# Patient Record
Sex: FEMALE | Race: WHITE | NOT HISPANIC OR LATINO | Employment: FULL TIME | URBAN - NONMETROPOLITAN AREA
[De-identification: names, ages, dates, MRNs, and addresses within clinical notes are randomized per-mention and may not be internally consistent; named-entity substitution may affect disease eponyms.]

---

## 2017-03-03 DIAGNOSIS — B00.2 ORAL HERPES: ICD-10-CM

## 2017-03-06 RX ORDER — VALACYCLOVIR HYDROCHLORIDE 1 G/1
TABLET, FILM COATED ORAL
Qty: 8 TABLET | OUTPATIENT
Start: 2017-03-06

## 2017-03-06 NOTE — TELEPHONE ENCOUNTER
Refill request received for Valtrex. Last seen 11/2015. No-showed 12/2016 and has not rescheduled.    Please sign or decline RX in this encounter.

## 2017-03-21 DIAGNOSIS — Z12.31 VISIT FOR SCREENING MAMMOGRAM: Primary | ICD-10-CM

## 2017-03-24 PROCEDURE — G0202 SCR MAMMO BI INCL CAD: HCPCS | Mod: TC | Performed by: RADIOLOGY

## 2017-03-24 PROCEDURE — 77063 BREAST TOMOSYNTHESIS BI: CPT | Mod: TC | Performed by: RADIOLOGY

## 2017-06-06 ENCOUNTER — MEDICAL CORRESPONDENCE (OUTPATIENT)
Dept: HEALTH INFORMATION MANAGEMENT | Facility: HOSPITAL | Age: 52
End: 2017-06-06

## 2017-06-15 ENCOUNTER — OFFICE VISIT (OUTPATIENT)
Dept: OTOLARYNGOLOGY | Facility: OTHER | Age: 52
End: 2017-06-15
Attending: OTOLARYNGOLOGY
Payer: COMMERCIAL

## 2017-06-15 VITALS
OXYGEN SATURATION: 99 % | WEIGHT: 160 LBS | TEMPERATURE: 97.2 F | HEART RATE: 80 BPM | DIASTOLIC BLOOD PRESSURE: 70 MMHG | BODY MASS INDEX: 25.71 KG/M2 | HEIGHT: 66 IN | SYSTOLIC BLOOD PRESSURE: 114 MMHG

## 2017-06-15 DIAGNOSIS — D10.30 FIBROMA OF INTRAORAL REGION: ICD-10-CM

## 2017-06-15 DIAGNOSIS — K13.70 ORAL LESION: Primary | ICD-10-CM

## 2017-06-15 PROCEDURE — 88305 TISSUE EXAM BY PATHOLOGIST: CPT | Mod: TC | Performed by: OTOLARYNGOLOGY

## 2017-06-15 PROCEDURE — 99203 OFFICE O/P NEW LOW 30 MIN: CPT | Mod: 25 | Performed by: OTOLARYNGOLOGY

## 2017-06-15 PROCEDURE — 40812 EXCISE/REPAIR MOUTH LESION: CPT | Performed by: OTOLARYNGOLOGY

## 2017-06-15 RX ORDER — LORATADINE 10 MG/1
10 TABLET ORAL PRN
COMMUNITY

## 2017-06-15 ASSESSMENT — PAIN SCALES - GENERAL: PAINLEVEL: NO PAIN (0)

## 2017-06-15 NOTE — MR AVS SNAPSHOT
After Visit Summary   6/15/2017    Patience Mireles    MRN: [id number]           Patient Information     Date Of Birth          1965        Visit Information        Provider Department      6/15/2017 1:15 PM Char Dunlap MD [facility]        Today's Diagnoses     Oral lesion    -  1    left buccal bite fibroma          Care Instructions    Thank you for allowing Dr. Dunlap and our ENT team to participate in your care.  If you have a scheduling or an appointment question please contact [facility] Unit Coordinator at their direct line [phone].   ALL nursing questions or concerns can be directed to your ENT nurse at: [phone] - [place]        POST PROCEDURE INSTRUCTIONS      For oral lesions, rinse your mouth with a mixture of half water and half hydrogen peroxide 3 times daily, after meals and before bed.       For lip lesions, apply Aquaphor Healing Ointment to the wound 3 times daily after cleaning with above mixture(Unless specified Bacitracin).      You can apply ice to the surgical area to help reduce swelling. (no longer than 20 minutes at a time)       You can use acetaminophen(Tylenol) or the prescription you received for pain.       If you have sutures in place these are dissolvable. They will fall out on their own. DO NOT play with them as this will cause more irritation to the surgical area.       If cautery was used, that is the blackened area you see. This will fade away and can become a white patchy area. This is normal! Continue to clean wound as described above.     SIGNS OF INFECTION ARE:    Redness, swelling, red streaks, pus, drainage, warmth, fever, increased pain, foul smell.     Contact your primary health care provider if you notice any of the warning signs.     FOLLOW - UP    Follow-up as needed in clinic.     Pathology results will be called to you when they are back. This can take approx. 7-10 days.             Follow-ups after  "your visit        Who to contact     If you have questions or need follow up information about today's clinic visit or your schedule please contact [facility] directly at [phone].  Normal or non-critical lab and imaging results will be communicated to you by MyChart, letter or phone within 4 business days after the clinic has received the results. If you do not hear from us within 7 days, please contact the clinic through MyChart or phone. If you have a critical or abnormal lab result, we will notify you by phone as soon as possible.  Submit refill requests through [employer] or call your pharmacy and they will forward the refill request to us. Please allow 3 business days for your refill to be completed.          Additional Information About Your Visit        [employer] Information     [employer] gives you secure access to your electronic health record. If you see a primary care provider, you can also send messages to your care team and make appointments. If you have questions, please call your primary care clinic.  If you do not have a primary care provider, please call [phone] and they will assist you.        Care EveryWhere ID     This is your Care EveryWhere ID. This could be used by other organizations to access your [place] medical records  [id number]        Your Vitals Were     Pulse Temperature Height Pulse Oximetry BMI (Body Mass Index)       80 97.2  F (36.2  C) (Tympanic) 5' 5.5\" (1.664 m) 99% 26.22 kg/m2        Blood Pressure from Last 3 Encounters:   06/15/17 114/70   03/11/16 103/67   11/11/15 108/72    Weight from Last 3 Encounters:   06/15/17 160 lb (72.6 kg)   03/11/16 160 lb (72.6 kg)   12/19/13 175 lb (79.4 kg)              We Performed the Following     EXCISION LESION MOUTH W SIMPLE REPAIR     Surgical pathology exam        Primary Care Provider Office Phone # Fax #    CAROLINA Mederos [phone]       [facility] [address]  [place] " [place] [address]        Thank you!     Thank you for choosing [facility]  for your care. Our goal is always to provide you with excellent care. Hearing back from our patients is one way we can continue to improve our services. Please take a few minutes to complete the written survey that you may receive in the mail after your visit with us. Thank you!             Your Updated Medication List - Protect others around you: Learn how to safely use, store and throw away your medicines at www.disposemymeds.org.          This list is accurate as of: 6/15/17  1:43 PM.  Always use your most recent med list.                   Brand Name Dispense Instructions for use    * estradiol 0.5 MG tablet    ESTRACE    90 tablet    TAKE 1 OR 2 TABLETS BY MOUTH EVERY DAY AS NEEDED       * estradiol 0.1 MG/24HR BIW patch    VIVELLE-DOT    12 patch    Place 1 patch onto the skin once a week       loratadine 10 MG tablet    CLARITIN     Take 10 mg by mouth daily       valACYclovir 1000 mg tablet    VALTREX    8 tablet    Take 2 tablets (2,000 mg) by mouth 2 times daily       * Notice:  This list has 2 medication(s) that are the same as other medications prescribed for you. Read the directions carefully, and ask your doctor or other care provider to review them with you.

## 2017-06-15 NOTE — NURSING NOTE
"Chief Complaint   Patient presents with     Consult     skin tag in mouth        Initial /70 (BP Location: Right arm, Patient Position: Chair, Cuff Size: Adult Regular)  Pulse 80  Temp 97.2  F (36.2  C) (Tympanic)  Ht 5' 5.5\" (1.664 m)  Wt 160 lb (72.6 kg)  SpO2 99%  BMI 26.22 kg/m2 Estimated body mass index is 26.22 kg/(m^2) as calculated from the following:    Height as of this encounter: 5' 5.5\" (1.664 m).    Weight as of this encounter: 160 lb (72.6 kg).  Medication Reconciliation: complete     Marbella Hogue LPN      "

## 2017-06-15 NOTE — PROGRESS NOTES
Otolaryngology Consultation    Patient: Patience Mireles  : 1965    Patient presents with:  Consult: skin tag in mouth   Referral:  Dr. Herrera, [place]    HPI:  Patience Mireles is a 52 year old female seen today for an oral cavity lesion  She noted this over 6 months ago, denies noting initial trauma to area but she now repeatedly bites the area causing pain and enlargement  Never tobacco user  No spontaneous bleeding nor ulceration    Current Outpatient Rx   Medication Sig Dispense Refill     loratadine (CLARITIN) 10 MG tablet Take 10 mg by mouth daily       estradiol (VIVELLE-DOT) 0.1 MG/24HR patch Place 1 patch onto the skin once a week 12 patch 2     estradiol (ESTRACE) 0.5 MG tablet TAKE 1 OR 2 TABLETS BY MOUTH EVERY DAY AS NEEDED 90 tablet 3     valACYclovir (VALTREX) 1000 mg tablet Take 2 tablets (2,000 mg) by mouth 2 times daily 8 tablet prn       Allergies: Review of patient's allergies indicates no known allergies.     Past Medical History:   Diagnosis Date     Asymptomatic varicose veins 2003     Carcinoma in situ of cervix uteri 2000     Chest pain, unspecified 2004     Dysmenorrhea 2000     Endometriosis of uterus 2000     Endometriosis, site unspecified 10/26/2000     Follow-up examination, following unspecified surgery 2001     Leukorrhea, not specified as infective 2007     Need for prophylactic vaccination and inoculation against viral hepatitis 2009     Nonallopathic lesion of cervical region, not elsewhere classified 2001     Other screening mammogram 2000     PONV (postoperative nausea and vomiting)      Routine general medical examination at a health care facility 2001       Past Surgical History:   Procedure Laterality Date     COLONOSCOPY N/A 2014    Procedure: COLONOSCOPY;  Surgeon: Quincy Fontanez MD;  Location: [facility] OR     ESOPHAGOSCOPY, GASTROSCOPY, DUODENOSCOPY (EGD), COMBINED  2013    Procedure: COMBINED  "ESOPHAGOSCOPY, GASTROSCOPY, DUODENOSCOPY (EGD);   ESOPHAGOGASTRODUODENOSCOPY WITH BIOPSIES;  Surgeon: Quincy Fontanez MD;  Location: [facility] OR     [facility] [place] [address]       ENT family history reviewed    Social History   Substance Use Topics     Smoking status: Never Smoker     Smokeless tobacco: Not on file     Alcohol use 0.0 oz/week       Review of Systems  ROS: 10 point ROS neg other than the symptoms noted above in the HPI and itchy eyes, night sweats    Physical Exam  /70 (BP Location: Right arm, Patient Position: Chair, Cuff Size: Adult Regular)  Pulse 80  Temp 97.2  F (36.2  C) (Tympanic)  Ht 5' 5.5\" (1.664 m)  Wt 160 lb (72.6 kg)  SpO2 99%  BMI 26.22 kg/m2  General - The patient is well nourished and well developed, and appears to have good nutritional status.  Alert and oriented to person and place, answers questions and cooperates with examination appropriately.   Head and Face - Normocephalic and atraumatic, with no gross asymmetry noted.  The facial nerve is intact, with strong symmetric movements.  Voice and Breathing - The patient was breathing comfortably without the use of accessory muscles. There was no wheezing, stridor, or stertor.  The patients voice was clear and strong, and had appropriate pitch and quality.  Ears -The external auditory canals are patent, the tympanic membranes are intact without effusion, retraction or mass.  Bony landmarks are intact.  Eyes - Extraocular movements intact, and the pupils were reactive to light.  Sclera were not icteric or injected, conjunctiva were pink and moist.  Mouth - Examination of the oral cavity showed pink, healthy oral mucosa. No ulcerations noted.  The tongue was mobile and midline, and the dentition were in good condition.     There is a 1.0 cm raised bite fibroma left buccal mucosa  No ulceration  Throat - The walls of the oropharynx were smooth, pink, moist, symmetric, and had no lesions or ulcerations.  The tonsillar pillars and soft " palate were symmetric.  The uvula was midline on elevation.  Grade 3 tonsils  Neck - Normal midline excursion of the laryngotracheal complex during swallowing.  Full range of motion on passive movement.  Palpation of the occipital, submental, submandibular, internal jugular chain, and supraclavicular nodes did not demonstrate any abnormal lymph nodes or masses.  Palpation of the thyroid was soft and smooth, with no nodules or goiter appreciated.  The trachea was mobile and midline.  Nose - External contour is symmetric, no gross deflection or scars.  Nasal mucosa is pink and moist with no abnormal mucus.  The septum and turbinates were evaluated: non obstructive.  No polyps, masses, or purulence noted on examination.      Procedure - Oral cavity lesion Removal    Informed consent was discussed and signed, and risks of the procedure were discussed, including bleeding, anesthesia, infection, scar formation, numbness, need for additional surgery, injury to salivary tissue.  I then infiltrated the mucosal tissues with 1% lidocaine with 1:200,000 epinephrine.  I then used a 15-blade to create an thin elliptical incision around the lesion.  I then elevated the  ellipse and a thin cuff of underlying normal appearing mucosa with the scalpel at the left buccal mucosa.  I proceeded to use a fine iris scissor to undermine the lesion and excise it.   Hemostasis was achieved with direct pressure and silver nitrate cautery.  The total length of the incision was 1.0 cm.  The specimen(s) was placed in formalin and sent to pathology.  The mucosal edges were then reapproximated using 4-0 chromic, one deep mucosal suture and 2 simple interrupted sutures mucosal sutures.  The patient tolerated the procedure without any difficulty.    Impression and Plan- Patience Mireles is a 52 year old female with:    ICD-10-CM    1. Oral lesion K13.70 Surgical pathology exam   2. left buccal bite fibroma D10.30          A/P - This patient has  had removal of an oral cavity lesion today.  I have instructed the patient on wound care with 1/2 strength peroxide rinses for 1 week, and  Diet.  Ibuprofen alternated with tylenol prn pain.  The patient will be called with pathology results.                  Char Dunlap D.O.  Otolaryngology/Head and Neck Surgery  Allergy

## 2017-06-15 NOTE — PATIENT INSTRUCTIONS
Thank you for allowing Dr. Dunlap and our ENT team to participate in your care.  If you have a scheduling or an appointment question please contact [facility] Unit Coordinator at their direct line [phone].   ALL nursing questions or concerns can be directed to your ENT nurse at: [phone] Kannan Karimi        POST PROCEDURE INSTRUCTIONS      For oral lesions, rinse your mouth with a mixture of half water and half hydrogen peroxide 3 times daily, after meals and before bed.       For lip lesions, apply Aquaphor Healing Ointment to the wound 3 times daily after cleaning with above mixture(Unless specified Bacitracin).      You can apply ice to the surgical area to help reduce swelling. (no longer than 20 minutes at a time)       You can use acetaminophen(Tylenol) or the prescription you received for pain.       If you have sutures in place these are dissolvable. They will fall out on their own. DO NOT play with them as this will cause more irritation to the surgical area.       If cautery was used, that is the blackened area you see. This will fade away and can become a white patchy area. This is normal! Continue to clean wound as described above.     SIGNS OF INFECTION ARE:    Redness, swelling, red streaks, pus, drainage, warmth, fever, increased pain, foul smell.     Contact your primary health care provider if you notice any of the warning signs.     FOLLOW - UP    Follow-up as needed in clinic.     Pathology results will be called to you when they are back. This can take approx. 7-10 days.

## 2017-06-19 LAB — COPATH REPORT: NORMAL

## 2017-07-18 ENCOUNTER — TELEPHONE (OUTPATIENT)
Dept: OBGYN | Facility: OTHER | Age: 52
End: 2017-07-18

## 2017-07-18 DIAGNOSIS — Z78.0 MENOPAUSE: ICD-10-CM

## 2017-07-18 RX ORDER — ESTRADIOL 0.1 MG/D
1 FILM, EXTENDED RELEASE TRANSDERMAL WEEKLY
Qty: 12 PATCH | Refills: 2 | OUTPATIENT
Start: 2017-07-18

## 2017-07-18 NOTE — TELEPHONE ENCOUNTER
Refill request received for Estradiol patch for 52 year-old non smoker. Last mammogram 3/2017. Last see by Dr. Muñoz 11/2015. No-showed annual 12/2016. No future appointments scheduled.    Please sign or decline RX in this encounter.   Also, do you want me to contact her to schedule annual? Not scheduled for annual with anyone.

## 2017-08-01 ENCOUNTER — OFFICE VISIT (OUTPATIENT)
Dept: OBGYN | Facility: OTHER | Age: 52
End: 2017-08-01
Attending: OBSTETRICS & GYNECOLOGY
Payer: COMMERCIAL

## 2017-08-01 VITALS
BODY MASS INDEX: 25.71 KG/M2 | SYSTOLIC BLOOD PRESSURE: 112 MMHG | WEIGHT: 160 LBS | HEIGHT: 66 IN | DIASTOLIC BLOOD PRESSURE: 76 MMHG | HEART RATE: 64 BPM

## 2017-08-01 DIAGNOSIS — B00.2 ORAL HERPES: ICD-10-CM

## 2017-08-01 DIAGNOSIS — Z78.0 MENOPAUSE: ICD-10-CM

## 2017-08-01 DIAGNOSIS — Z53.9 NO SHOW: ICD-10-CM

## 2017-08-01 PROBLEM — Z90.79 STATUS POST TAH-BSO: Status: ACTIVE | Noted: 2017-08-01

## 2017-08-01 PROBLEM — Z90.722 STATUS POST TAH-BSO: Status: ACTIVE | Noted: 2017-08-01

## 2017-08-01 PROBLEM — Z90.710 STATUS POST TAH-BSO: Status: ACTIVE | Noted: 2017-08-01

## 2017-08-01 PROCEDURE — 99213 OFFICE O/P EST LOW 20 MIN: CPT | Performed by: OBSTETRICS & GYNECOLOGY

## 2017-08-01 RX ORDER — ESTRADIOL 0.5 MG/1
TABLET ORAL
Qty: 90 TABLET | Refills: 3 | Status: SHIPPED | OUTPATIENT
Start: 2017-08-01 | End: 2017-12-11

## 2017-08-01 RX ORDER — VALACYCLOVIR HYDROCHLORIDE 1 G/1
2000 TABLET, FILM COATED ORAL 2 TIMES DAILY
Qty: 8 TABLET | Status: SHIPPED | OUTPATIENT
Start: 2017-08-01 | End: 2017-12-11

## 2017-08-01 RX ORDER — ESTRADIOL 0.1 MG/D
1 FILM, EXTENDED RELEASE TRANSDERMAL WEEKLY
Qty: 12 PATCH | Refills: 4 | Status: SHIPPED | OUTPATIENT
Start: 2017-08-01 | End: 2017-12-11

## 2017-08-01 NOTE — NURSING NOTE
"Chief Complaint   Patient presents with     Refill Request       Initial /76  Pulse 64  Ht 5' 5.5\" (1.664 m)  Wt 160 lb (72.6 kg)  BMI 26.22 kg/m2 Estimated body mass index is 26.22 kg/(m^2) as calculated from the following:    Height as of this encounter: 5' 5.5\" (1.664 m).    Weight as of this encounter: 160 lb (72.6 kg).  Medication Reconciliation: norah Price      "

## 2017-08-01 NOTE — MR AVS SNAPSHOT
After Visit Summary   8/1/2017    Patience Mireles    MRN: [id number]           Patient Information     Date Of Birth          1965        Visit Information        Provider Department      8/1/2017 8:50 AM Tracie Muñoz MD [facility]        Today's Diagnoses     NO SHOW        Oral herpes        Menopause          Care Instructions    Return for annual exam.            Follow-ups after your visit        Your next 10 appointments already scheduled     Dec 11, 2017 10:00 AM CST   (Arrive by 9:45 AM)   PHYSICAL with Tracie Muñoz MD   [facility] [place] ([facility] [place] )    [address]  [place] [address]   [phone]              Who to contact     If you have questions or need follow up information about today's clinic visit or your schedule please contact [facility] directly at [phone].  Normal or non-critical lab and imaging results will be communicated to you by MyChart, letter or phone within 4 business days after the clinic has received the results. If you do not hear from us within 7 days, please contact the clinic through MyChart or phone. If you have a critical or abnormal lab result, we will notify you by phone as soon as possible.  Submit refill requests through [employer] or call your pharmacy and they will forward the refill request to us. Please allow 3 business days for your refill to be completed.          Additional Information About Your Visit        MyChart Information     [employer] gives you secure access to your electronic health record. If you see a primary care provider, you can also send messages to your care team and make appointments. If you have questions, please call your primary care clinic.  If you do not have a primary care provider, please call [phone] and they will assist you.        Care EveryWhere ID     This is your Care EveryWhere ID. This could be used by other organizations to access your  "[place] medical records  [id number]        Your Vitals Were     Pulse Height BMI (Body Mass Index)             64 5' 5.5\" (1.664 m) 26.22 kg/m2          Blood Pressure from Last 3 Encounters:   08/01/17 112/76   06/15/17 114/70   03/11/16 103/67    Weight from Last 3 Encounters:   08/01/17 160 lb (72.6 kg)   06/15/17 160 lb (72.6 kg)   03/11/16 160 lb (72.6 kg)              Today, you had the following     No orders found for display         Today's Medication Changes          These changes are accurate as of: 8/1/17 11:10 AM.  If you have any questions, ask your nurse or doctor.               These medicines have changed or have updated prescriptions.        Dose/Directions    * estradiol 0.1 MG/24HR BIW patch   Commonly known as:  VIVELLE-DOT   This may have changed:  Another medication with the same name was changed. Make sure you understand how and when to take each.   Used for:  Menopause   Changed by:  Tracie Muñoz MD        Dose:  1 patch   Place 1 patch onto the skin once a week   Quantity:  12 patch   Refills:  4       * estradiol 0.5 MG tablet   Commonly known as:  ESTRACE   This may have changed:  See the new instructions.   Used for:  Menopause   Changed by:  Tracie Muñoz MD        TAKE 1 OR 2 TABLETS BY MOUTH EVERY DAY AS NEEDED   Quantity:  90 tablet   Refills:  3       * Notice:  This list has 2 medication(s) that are the same as other medications prescribed for you. Read the directions carefully, and ask your doctor or other care provider to review them with you.         Where to get your medicines      These medications were sent to [facility] - [address]  [address] [address]     Phone:  [phone]     estradiol 0.1 MG/24HR BIW patch    estradiol 0.5 MG tablet    valACYclovir 1000 mg tablet                Primary Care Provider Office Phone # Fax #    CAROLINA Mederos [phone]       [facility] [address] [facility] [address]  [facility] [address]        Equal Access to Services     JESSICABRUCE DINA : Hadii radha ku brody Kennedy, waalpada luqlisha, qafacundota kapinaholland tituslaronholland, niall osorio rubiocindy delgado eligiolloyd nguyen. So [facility] [phone].    ATENCIÓN: Si habla español, tiene a mckinney disposición servicios gratuitos de asistencia lingüística. [facility] [phone].    We comply with applicable federal civil rights laws and [place] laws. We do not discriminate on the basis of race, color, national origin, age, disability sex, sexual orientation or gender identity.            Thank you!     Thank you for choosing [facility]  for your care. Our goal is always to provide you with excellent care. Hearing back from our patients is one way we can continue to improve our services. Please take a few minutes to complete the written survey that you may receive in the mail after your visit with us. Thank you!             Your Updated Medication List - Protect others around you: Learn how to safely use, store and throw away your medicines at www.disposemymeds.org.          This list is accurate as of: 8/1/17 11:10 AM.  Always use your most recent med list.                   Brand Name Dispense Instructions for use Diagnosis    * estradiol 0.1 MG/24HR BIW patch    VIVELLE-DOT    12 patch    Place 1 patch onto the skin once a week    Menopause       * estradiol 0.5 MG tablet    ESTRACE    90 tablet    TAKE 1 OR 2 TABLETS BY MOUTH EVERY DAY AS NEEDED    Menopause       loratadine 10 MG tablet    CLARITIN     Take 10 mg by mouth as needed        valACYclovir 1000 mg tablet    VALTREX    8 tablet    Take 2 tablets (2,000 mg) by mouth 2 times daily    Oral herpes       * Notice:  This list has 2 medication(s) that are the same as other medications prescribed for you. Read the directions carefully, and ask your doctor or other care provider to review them with you.

## 2017-08-01 NOTE — PROGRESS NOTES
"Patience Mireles is a 52 year old female who called [facility] and tried to reschedule.  Last mammogram current. Last colonoscopy current.      O:   /76  Pulse 64  Ht 5' 5.5\" (1.664 m)  Wt 160 lb (72.6 kg)  BMI 26.22 kg/m2   aa    A:  menopausal    P:  refills done  rto annual    Greater than 15 minutes were spent face to face counseling this patient    Tracie Muñoz MD    "

## 2017-08-03 ASSESSMENT — ANXIETY QUESTIONNAIRES
3. WORRYING TOO MUCH ABOUT DIFFERENT THINGS: NOT AT ALL
GAD7 TOTAL SCORE: 0
5. BEING SO RESTLESS THAT IT IS HARD TO SIT STILL: NOT AT ALL
1. FEELING NERVOUS, ANXIOUS, OR ON EDGE: NOT AT ALL
6. BECOMING EASILY ANNOYED OR IRRITABLE: NOT AT ALL
7. FEELING AFRAID AS IF SOMETHING AWFUL MIGHT HAPPEN: NOT AT ALL
2. NOT BEING ABLE TO STOP OR CONTROL WORRYING: NOT AT ALL

## 2017-08-03 ASSESSMENT — PATIENT HEALTH QUESTIONNAIRE - PHQ9: 5. POOR APPETITE OR OVEREATING: NOT AT ALL

## 2017-08-04 ASSESSMENT — ANXIETY QUESTIONNAIRES: GAD7 TOTAL SCORE: 0

## 2017-08-04 ASSESSMENT — PATIENT HEALTH QUESTIONNAIRE - PHQ9: SUM OF ALL RESPONSES TO PHQ QUESTIONS 1-9: 0

## 2017-08-24 ENCOUNTER — OFFICE VISIT (OUTPATIENT)
Dept: FAMILY MEDICINE | Facility: OTHER | Age: 52
End: 2017-08-24
Attending: PHYSICIAN ASSISTANT
Payer: COMMERCIAL

## 2017-08-24 VITALS
HEIGHT: 66 IN | TEMPERATURE: 97.2 F | DIASTOLIC BLOOD PRESSURE: 82 MMHG | HEART RATE: 77 BPM | OXYGEN SATURATION: 100 % | SYSTOLIC BLOOD PRESSURE: 122 MMHG

## 2017-08-24 DIAGNOSIS — R14.3 FLATULENCE, ERUCTATION, AND GAS PAIN: Primary | ICD-10-CM

## 2017-08-24 DIAGNOSIS — R14.1 FLATULENCE, ERUCTATION, AND GAS PAIN: Primary | ICD-10-CM

## 2017-08-24 DIAGNOSIS — R14.2 FLATULENCE, ERUCTATION, AND GAS PAIN: Primary | ICD-10-CM

## 2017-08-24 LAB
ALBUMIN SERPL-MCNC: 3.9 G/DL (ref 3.4–5)
ALBUMIN UR-MCNC: NEGATIVE MG/DL
ALP SERPL-CCNC: 63 U/L (ref 40–150)
ALT SERPL W P-5'-P-CCNC: 27 U/L (ref 0–50)
AMYLASE SERPL-CCNC: 51 U/L (ref 30–110)
ANION GAP SERPL CALCULATED.3IONS-SCNC: 7 MMOL/L (ref 3–14)
APPEARANCE UR: CLEAR
AST SERPL W P-5'-P-CCNC: 19 U/L (ref 0–45)
BACTERIA #/AREA URNS HPF: ABNORMAL /HPF
BILIRUB SERPL-MCNC: 0.4 MG/DL (ref 0.2–1.3)
BILIRUB UR QL STRIP: NEGATIVE
BUN SERPL-MCNC: 18 MG/DL (ref 7–30)
CALCIUM SERPL-MCNC: 8.6 MG/DL (ref 8.5–10.1)
CHLORIDE SERPL-SCNC: 105 MMOL/L (ref 94–109)
CO2 SERPL-SCNC: 25 MMOL/L (ref 20–32)
COLOR UR AUTO: ABNORMAL
CREAT SERPL-MCNC: 0.65 MG/DL (ref 0.52–1.04)
CRP SERPL-MCNC: <2.9 MG/L (ref 0–8)
ERYTHROCYTE [DISTWIDTH] IN BLOOD BY AUTOMATED COUNT: 11.9 % (ref 10–15)
ERYTHROCYTE [SEDIMENTATION RATE] IN BLOOD BY WESTERGREN METHOD: 9 MM/H (ref 0–30)
GFR SERPL CREATININE-BSD FRML MDRD: >90 ML/MIN/1.7M2
GLUCOSE SERPL-MCNC: 86 MG/DL (ref 70–99)
GLUCOSE UR STRIP-MCNC: NEGATIVE MG/DL
HCT VFR BLD AUTO: 40.3 % (ref 35–47)
HGB BLD-MCNC: 13.6 G/DL (ref 11.7–15.7)
HGB UR QL STRIP: NEGATIVE
KETONES UR STRIP-MCNC: NEGATIVE MG/DL
LEUKOCYTE ESTERASE UR QL STRIP: NEGATIVE
LIPASE SERPL-CCNC: 141 U/L (ref 73–393)
MCH RBC QN AUTO: 31.1 PG (ref 26.5–33)
MCHC RBC AUTO-ENTMCNC: 33.7 G/DL (ref 31.5–36.5)
MCV RBC AUTO: 92 FL (ref 78–100)
NITRATE UR QL: NEGATIVE
PH UR STRIP: 5 PH (ref 4.7–8)
PLATELET # BLD AUTO: 306 10E9/L (ref 150–450)
POTASSIUM SERPL-SCNC: 3.9 MMOL/L (ref 3.4–5.3)
PROT SERPL-MCNC: 7.5 G/DL (ref 6.8–8.8)
RBC # BLD AUTO: 4.38 10E12/L (ref 3.8–5.2)
RBC #/AREA URNS AUTO: 0 /HPF (ref 0–2)
SODIUM SERPL-SCNC: 137 MMOL/L (ref 133–144)
SOURCE: ABNORMAL
SP GR UR STRIP: 1 (ref 1–1.03)
UROBILINOGEN UR STRIP-MCNC: NORMAL MG/DL (ref 0–2)
WBC # BLD AUTO: 8.1 10E9/L (ref 4–11)
WBC #/AREA URNS AUTO: <1 /HPF (ref 0–2)

## 2017-08-24 PROCEDURE — 86140 C-REACTIVE PROTEIN: CPT | Performed by: PHYSICIAN ASSISTANT

## 2017-08-24 PROCEDURE — 85027 COMPLETE CBC AUTOMATED: CPT | Performed by: PHYSICIAN ASSISTANT

## 2017-08-24 PROCEDURE — 99214 OFFICE O/P EST MOD 30 MIN: CPT | Performed by: PHYSICIAN ASSISTANT

## 2017-08-24 PROCEDURE — 82150 ASSAY OF AMYLASE: CPT | Performed by: PHYSICIAN ASSISTANT

## 2017-08-24 PROCEDURE — 85652 RBC SED RATE AUTOMATED: CPT | Performed by: PHYSICIAN ASSISTANT

## 2017-08-24 PROCEDURE — 36415 COLL VENOUS BLD VENIPUNCTURE: CPT | Performed by: PHYSICIAN ASSISTANT

## 2017-08-24 PROCEDURE — 80053 COMPREHEN METABOLIC PANEL: CPT | Performed by: PHYSICIAN ASSISTANT

## 2017-08-24 PROCEDURE — 83690 ASSAY OF LIPASE: CPT | Performed by: PHYSICIAN ASSISTANT

## 2017-08-24 PROCEDURE — 81001 URINALYSIS AUTO W/SCOPE: CPT | Performed by: PHYSICIAN ASSISTANT

## 2017-08-24 ASSESSMENT — PAIN SCALES - GENERAL: PAINLEVEL: NO PAIN (0)

## 2017-08-24 NOTE — MR AVS SNAPSHOT
After Visit Summary   8/24/2017    Patience Mireles    MRN: [id number]           Patient Information     Date Of Birth          1965        Visit Information        Provider Department      8/24/2017 11:15 AM Morelia Rosenthal PA [facility]        Today's Diagnoses     Flatulence, eructation, and gas pain    -  1      Care Instructions    FODMAP.              Follow-ups after your visit        Additional Services     GASTROENTEROLOGY ADULT REF CONSULT ONLY       Preferred Location: saw [facility] in the past.       Please be aware that coverage of these services is subject to the terms and limitations of your health insurance plan.  Call member services at your health plan with any benefit or coverage questions.  Any procedures must be performed at a [facility] facility OR coordinated by your clinic's referral office.    Please bring the following with you to your appointment:    (1) Any X-Rays, CTs or MRIs which have been performed.  Contact the facility where they were done to arrange for  prior to your scheduled appointment.    (2) List of current medications   (3) This referral request   (4) Any documents/labs given to you for this referral                  Your next 10 appointments already scheduled     Dec 11, 2017 10:00 AM CST   (Arrive by 9:45 AM)   PHYSICAL with Tracie uMñoz MD   [facility] ([facility] [place] )    [address]  [place] [address]   [phone]              Future tests that were ordered for you today     Open Future Orders        Priority Expected Expires Ordered    H Pylori antigen, stool Routine  9/23/2017 8/24/2017            Who to contact     If you have questions or need follow up information about today's clinic visit or your schedule please [facility] directly at [phone].  Normal or non-critical lab and imaging results will be communicated to you by MyChart, letter or phone within 4  "business days after the clinic has received the results. If you do not hear from us within 7 days, please contact the clinic through atVenu or phone. If you have a critical or abnormal lab result, we will notify you by phone as soon as possible.  Submit refill requests through at[employer]enu or call your pharmacy and they will forward the refill request to us. Please allow 3 business days for your refill to be completed.          Additional Information About Your Visit        atVenu Information     at[employer] gives you secure access to your electronic health record. If you see a primary care provider, you can also send messages to your care team and make appointments. If you have questions, please call your primary care clinic.  If you do not have a primary care provider, please call [phone] and they will assist you.        Care EveryWhere ID     This is your Care EveryWhere ID. This could be used by other organizations to access your [facility] medical records  [id number]        Your Vitals Were     Pulse Temperature Height Pulse Oximetry          77 97.2  F (36.2  C) (Tympanic) 5' 5.5\" (1.664 m) 100%         Blood Pressure from Last 3 Encounters:   08/24/17 122/82   08/01/17 112/76   06/15/17 114/70    Weight from Last 3 Encounters:   08/01/17 160 lb (72.6 kg)   06/15/17 160 lb (72.6 kg)   03/11/16 160 lb (72.6 kg)              We Performed the Following     Amylase     CBC with platelets     Comprehensive metabolic panel     CRP, inflammation     ESR: Erythrocyte sedimentation rate     GASTROENTEROLOGY ADULT REF CONSULT ONLY     Lipase     UA with Microscopic reflex to Culture          Today's Medication Changes          These changes are accurate as of: 8/24/17 12:34 PM.  If you have any questions, ask your nurse or doctor.               Start taking these medicines.        Dose/Directions    linaclotide 145 MCG capsule   Commonly known as:  LINZESS   Used for:  Flatulence, eructation, and gas pain   Started by:  " Morelia Rosenthal PA        Dose:  145 mcg   Take 1 capsule (145 mcg) by mouth every morning (before breakfast)   Quantity:  30 capsule   Refills:  0            Where to get your medicines      These medications were sent to [facility] - [address]  [address] [place] [address]     Phone:  [phone]     linaclotide 145 MCG capsule                Primary Care Provider Office Phone # Fax #    CAROLINA Mederos [phone]       [facility] [address]  [facility] [address]        Equal Access to Services     [facility]: Hadii aad ku hadasho Soomaali, waaxda luqadaha, qaybta kaalmada adeegyada, waxay idiin hayaan adeeg kofi samano . So [facility] [phone].    ATENCIÓN: Si habla español, tiene a mckinney disposición servicios gratuitos de asistencia lingüística. [facility] [phone].    We comply with applicable federal civil rights laws and [place] laws. We do not discriminate on the basis of race, color, national origin, age, disability sex, sexual orientation or gender identity.            Thank you!     Thank you for choosing [facility]  for your care. Our goal is always to provide you with excellent care. Hearing back from our patients is one way we can continue to improve our services. Please take a few minutes to complete the written survey that you may receive in the mail after your visit with us. Thank you!             Your Updated Medication List - Protect others around you: Learn how to safely use, store and throw away your medicines at www.disposemymeds.org.          This list is accurate as of: 8/24/17 12:34 PM.  Always use your most recent med list.                   Brand Name Dispense Instructions for use Diagnosis    * estradiol 0.1 MG/24HR BIW patch    VIVELLE-DOT    12 patch    Place 1 patch onto the skin once a week    Menopause       * estradiol 0.5 MG tablet    ESTRACE    90 tablet    TAKE 1 OR 2 TABLETS  BY MOUTH EVERY DAY AS NEEDED    Menopause       linaclotide 145 MCG capsule    LINZESS    30 capsule    Take 1 capsule (145 mcg) by mouth every morning (before breakfast)    Flatulence, eructation, and gas pain       loratadine 10 MG tablet    CLARITIN     Take 10 mg by mouth as needed        valACYclovir 1000 mg tablet    VALTREX    8 tablet    Take 2 tablets (2,000 mg) by mouth 2 times daily    Oral herpes       * Notice:  This list has 2 medication(s) that are the same as other medications prescribed for you. Read the directions carefully, and ask your doctor or other care provider to review them with you.

## 2017-08-24 NOTE — PROGRESS NOTES
"  SUBJECTIVE:   Patience Mireles is a 52 year old female who presents to clinic today for the following health issues:      Abdominal Pain      Duration: ongoing     Description (location/character/radiation): radiating pain- bloating pain- started 5 years ago after going to [place]- was treated to H. Pylori did not help- \"something doesn't feel right\" worse at night - \"feels like when you are pregnant and you have stuff pushing onto your organs\" - wakes her up a night with pressure        Associated flank pain: sometimes    Intensity:  Intermittent pain     Accompanying signs and symptoms:        Fever/Chills: no        Gas/Bloating: YES       Nausea/vomitting: YES- nausea sometimes       Diarrhea: no        Dysuria or Hematuria: no     History (previous similar pain/trauma/previous testing): was treated before- also had an colonoscopy, egd, ultrasound- everything but laprascopy    Precipitating or alleviating factors:       Pain worse with eating/BM/urination: no       Pain relieved by BM: no     Therapies tried and outcome: treated for H pylori- didn't help     LMP:  not applicable- total hysterectomy             Problem list and histories reviewed & adjusted, as indicated.  Additional history: as documented    Patient Active Problem List   Diagnosis     Annual physical exam     Menopause     Bloated abdomen     ACP (advance care planning)     NO SHOW     Status post WERNER-BSO     Past Surgical History:   Procedure Laterality Date     COLONOSCOPY N/A 11/6/2014    Procedure: COLONOSCOPY;  Surgeon: Quincy Fontanez MD;  Location: [facility]     ESOPHAGOSCOPY, GASTROSCOPY, DUODENOSCOPY (EGD), COMBINED  8/9/2013    Procedure: COMBINED ESOPHAGOSCOPY, GASTROSCOPY, DUODENOSCOPY (EGD);   ESOPHAGOGASTRODUODENOSCOPY WITH BIOPSIES;  Surgeon: Quincy Fontanez MD;  Location: [facility] OR     WERNER with BSO  2004       Social History   Substance Use Topics     Smoking status: Never Smoker     Smokeless tobacco: Never Used     Alcohol use " 0.0 oz/week     Family History   Problem Relation Age of Onset     HEART DISEASE Mother 68     MI; cause of death     C.A.D. Father      DIABETES Father      Hypertension Father      Osteoarthritis Father          Current Outpatient Prescriptions   Medication Sig Dispense Refill     linaclotide (LINZESS) 145 MCG capsule Take 1 capsule (145 mcg) by mouth every morning (before breakfast) 30 capsule 0     estradiol (VIVELLE-DOT) 0.1 MG/24HR BIW patch Place 1 patch onto the skin once a week 12 patch 4     estradiol (ESTRACE) 0.5 MG tablet TAKE 1 OR 2 TABLETS BY MOUTH EVERY DAY AS NEEDED 90 tablet 3     loratadine (CLARITIN) 10 MG tablet Take 10 mg by mouth as needed        valACYclovir (VALTREX) 1000 mg tablet Take 2 tablets (2,000 mg) by mouth 2 times daily (Patient not taking: Reported on 8/24/2017) 8 tablet prn     No Known Allergies  Recent Labs   Lab Test  03/11/16   1426  03/11/16   0924  09/17/14   0746  07/10/13   1746   A1C   --   5.2  5.3   --    LDL   --   97  93   --    HDL   --   78  97   --    TRIG   --   44  49   --    ALT  17   --    --   22   CR  0.70   --    --   0.78   GFRESTIMATED  88   --    --   79   GFRESTBLACK  >90   GFR Calc     --    --   >90   POTASSIUM  3.9   --    --   4.7   TSH   --   1.60  2.09  2.34      BP Readings from Last 3 Encounters:   08/24/17 122/82   08/01/17 112/76   06/15/17 114/70    Wt Readings from Last 3 Encounters:   08/01/17 160 lb (72.6 kg)   06/15/17 160 lb (72.6 kg)   03/11/16 160 lb (72.6 kg)                          Reviewed and updated [facility]       Reviewed and updated as needed this visit by Provider         ROS:  Constitutional, neuro, ENT, endocrine, pulmonary, cardiac, gastrointestinal, genitourinary, musculoskeletal, integument and psychiatric systems are negative, except as otherwise noted.      OBJECTIVE:                                                    /82 (BP Location: Left arm,  "Patient Position: Supine, Cuff Size: Adult Regular)  Pulse 77  Temp 97.2  F (36.2  C) (Tympanic)  Ht 5' 5.5\" (1.664 m)  SpO2 100%  There is no height or weight on file to calculate BMI.  GENERAL APPEARANCE: healthy, alert and no distress  EYES: Eyes grossly normal to inspection, PERRL and conjunctivae and sclerae normal  HENT: ear canals and TM's normal and nose and mouth without ulcers or lesions  NECK: no adenopathy, no asymmetry, masses, or scars and thyroid normal to palpation  RESP: lungs clear to auscultation - no rales, rhonchi or wheezes  CV: regular rates and rhythm, normal S1 S2, no S3 or S4 and no murmur, click or rub  LYMPHATICS: normal ant/post cervical and supraclavicular nodes  ABDOMEN: soft, nontender, without hepatosplenomegaly or masses and bowel sounds normal  MS: extremities normal- no gross deformities noted  SKIN: no suspicious lesions or rashes  NEURO: Normal strength and tone, mentation intact and speech normal  PSYCH: mentation appears normal and affect normal/bright    Diagnostic test results:  Diagnostic Test Results:  No results found for this or any previous visit (from the past 24 hour(s)).       ASSESSMENT/PLAN:                                                    1. Flatulence, eructation, and gas pain  She is going to be having below done.  Has been 2 years. Has tried all herbals.  Go with FODMAP refer back to Gi, ? PH studies.  See if there is anything further they recommend.   - Comprehensive metabolic panel  - UA with Microscopic reflex to Culture  - Lipase  - Amylase  - H Pylori antigen, stool; Future  - linaclotide (LINZESS) 145 MCG capsule; Take 1 capsule (145 mcg) by mouth every morning (before breakfast)  Dispense: 30 capsule; Refill: 0      See Patient Instructions    Morelia Rosenthal PA-C  [facility] [place]    "

## 2017-10-18 DIAGNOSIS — R14.1 FLATULENCE, ERUCTATION, AND GAS PAIN: ICD-10-CM

## 2017-10-18 DIAGNOSIS — R14.3 FLATULENCE, ERUCTATION, AND GAS PAIN: ICD-10-CM

## 2017-10-18 DIAGNOSIS — R14.2 FLATULENCE, ERUCTATION, AND GAS PAIN: ICD-10-CM

## 2017-10-18 PROCEDURE — 87338 HPYLORI STOOL AG IA: CPT | Mod: 90 | Performed by: PHYSICIAN ASSISTANT

## 2017-10-18 PROCEDURE — 99000 SPECIMEN HANDLING OFFICE-LAB: CPT | Performed by: PHYSICIAN ASSISTANT

## 2017-10-19 LAB
H PYLORI AG STL QL IA: NORMAL
SPECIMEN SOURCE: NORMAL

## 2017-11-26 ENCOUNTER — HEALTH MAINTENANCE LETTER (OUTPATIENT)
Age: 52
End: 2017-11-26

## 2017-12-11 ENCOUNTER — OFFICE VISIT (OUTPATIENT)
Dept: OBGYN | Facility: OTHER | Age: 52
End: 2017-12-11
Attending: OBSTETRICS & GYNECOLOGY
Payer: COMMERCIAL

## 2017-12-11 VITALS — HEIGHT: 66 IN | DIASTOLIC BLOOD PRESSURE: 80 MMHG | SYSTOLIC BLOOD PRESSURE: 118 MMHG | HEART RATE: 60 BPM

## 2017-12-11 DIAGNOSIS — B00.2 ORAL HERPES: ICD-10-CM

## 2017-12-11 DIAGNOSIS — Z78.0 MENOPAUSE: ICD-10-CM

## 2017-12-11 DIAGNOSIS — Z12.31 ENCOUNTER FOR SCREENING MAMMOGRAM FOR BREAST CANCER: ICD-10-CM

## 2017-12-11 DIAGNOSIS — Z00.00 ROUTINE GENERAL MEDICAL EXAMINATION AT A HEALTH CARE FACILITY: Primary | ICD-10-CM

## 2017-12-11 LAB — HEMOCCULT SP1 STL QL: NEGATIVE

## 2017-12-11 PROCEDURE — 99396 PREV VISIT EST AGE 40-64: CPT | Performed by: OBSTETRICS & GYNECOLOGY

## 2017-12-11 PROCEDURE — 82274 ASSAY TEST FOR BLOOD FECAL: CPT | Performed by: OBSTETRICS & GYNECOLOGY

## 2017-12-11 RX ORDER — ESTRADIOL 0.1 MG/D
1 FILM, EXTENDED RELEASE TRANSDERMAL WEEKLY
Qty: 12 PATCH | Refills: 4 | Status: SHIPPED | OUTPATIENT
Start: 2017-12-11 | End: 2019-03-25

## 2017-12-11 RX ORDER — VALACYCLOVIR HYDROCHLORIDE 1 G/1
2000 TABLET, FILM COATED ORAL 2 TIMES DAILY
Qty: 8 TABLET | Status: SHIPPED | OUTPATIENT
Start: 2017-12-11 | End: 2019-02-14

## 2017-12-11 RX ORDER — ESTRADIOL 0.5 MG/1
TABLET ORAL
Qty: 90 TABLET | Refills: 3 | Status: SHIPPED | OUTPATIENT
Start: 2017-12-11 | End: 2018-12-07

## 2017-12-11 ASSESSMENT — ANXIETY QUESTIONNAIRES
6. BECOMING EASILY ANNOYED OR IRRITABLE: NOT AT ALL
GAD7 TOTAL SCORE: 0
2. NOT BEING ABLE TO STOP OR CONTROL WORRYING: NOT AT ALL
1. FEELING NERVOUS, ANXIOUS, OR ON EDGE: NOT AT ALL
3. WORRYING TOO MUCH ABOUT DIFFERENT THINGS: NOT AT ALL
7. FEELING AFRAID AS IF SOMETHING AWFUL MIGHT HAPPEN: NOT AT ALL
5. BEING SO RESTLESS THAT IT IS HARD TO SIT STILL: NOT AT ALL

## 2017-12-11 ASSESSMENT — PATIENT HEALTH QUESTIONNAIRE - PHQ9
SUM OF ALL RESPONSES TO PHQ QUESTIONS 1-9: 0
5. POOR APPETITE OR OVEREATING: NOT AT ALL

## 2017-12-11 NOTE — NURSING NOTE
"Chief Complaint   Patient presents with     Gyn Exam       Initial /80  Pulse 60  Ht 5' 5.5\" (1.664 m) Estimated body mass index is 26.22 kg/(m^2) as calculated from the following:    Height as of 8/1/17: 5' 5.5\" (1.664 m).    Weight as of 8/1/17: 160 lb (72.6 kg).  Medication Reconciliation: norah Price      "

## 2017-12-11 NOTE — PROGRESS NOTES
ANNUAL    Patience is a 52 year old   female who presents for annual exam.   Postmenopausal since .  She is having n. No vaginal bleeding noted.     Concerns other than routine annual and health maintenance:  n.  GYNECOLOGIC HISTORY:    She is sexually active  Problems with sex: n  Safe: y   Wanting std testing? n  Estrogen replacement therapy:  y discussed stopping  History of abnormal Pap smear: y  Family history of breast cancer pgm, ovarian cancer n, uterine cancer n, colon cancer:  n       HEALTH MAINTENANCE:  Cholesterol: (  Cholesterol   Date Value Ref Range Status   2016 184 <200 mg/dL Final   2014 200 (H) <200 mg/dL Final     Comment:     LDL Cholesterol is the primary guide to therapy.   The NCEP recommends further evaluation of: patients with cholesterol greater   than 200 mg/dL if additional risk factors are present, cholesterol greater   than   240 mg/dL, triglycerides greater than 150 mg/dL, or HDL less than 40 mg/dL.      History of abnormal lipids: n   Last Mammo: current . History of abnormal Mammo: n   Regular Self Breast Exams: y  Last Colonoscopy: 4 years History of abnormal Colonoscopy n  Calcium or vitamins; n   Exercise: y     TSH: (  TSH   Date Value Ref Range Status   2016 1.60 0.40 - 4.00 mU/L Final    )  Pap: (No results found for: PAP )    HISTORY:  Obstetric History       T0      L2     SAB0   TAB0   Ectopic0   Multiple0   Live Births2       # Outcome Date GA Lbr Anant/2nd Weight Sex Delivery Anes PTL Lv   2 Para 89    M Vag-Spont   MARTA   1 Para     F Vag-Spont   MARTA        Past Medical History:   Diagnosis Date     Asymptomatic varicose veins 2003     Carcinoma in situ of cervix uteri 2000     Chest pain, unspecified 2004     Dysmenorrhea 2000     Endometriosis of uterus 2000     Endometriosis, site unspecified 10/26/2000     Follow-up examination, following unspecified surgery 2001     Leukorrhea, not specified  as infective 7/11/2007     Need for prophylactic vaccination and inoculation against viral hepatitis 1/20/2009     Nonallopathic lesion of cervical region, not elsewhere classified 2/23/2001     Other screening mammogram 8/29/2000     PONV (postoperative nausea and vomiting)      Routine general medical examination at a health care facility 12/19/2001     Past Surgical History:   Procedure Laterality Date     COLONOSCOPY N/A 11/6/2014    Procedure: COLONOSCOPY;  Surgeon: Quincy Fontanez MD;  [facility]: [facility]     ESOPHAGOSCOPY, GASTROSCOPY, DUODENOSCOPY (EGD), COMBINED  8/9/2013    Procedure: COMBINED ESOPHAGOSCOPY, GASTROSCOPY, DUODENOSCOPY (EGD);   ESOPHAGOGASTRODUODENOSCOPY WITH BIOPSIES;  Surgeon: Quincy Fontanez MD;  Location: [facility]     WERNER with BSO  2004     Family History   Problem Relation Age of Onset     HEART DISEASE Mother 68     MI; cause of death     C.A.D. Father      DIABETES Father      Hypertension Father      Osteoarthritis Father      Social History     Social History     Marital status: Single     Spouse name: N/A     Number of children: N/A     Years of education: N/A     Social History Main Topics     Smoking status: Never Smoker     Smokeless tobacco: Never Used     Alcohol use 0.0 oz/week     Drug use: None     Sexual activity: Not Asked     Other Topics Concern     Caffeine Concern Yes     Parent/Sibling W/ Cabg, Mi Or Angioplasty Before 65f 55m? No     Social History Narrative       Current Outpatient Prescriptions:      linaclotide (LINZESS) 145 MCG capsule, Take 1 capsule (145 mcg) by mouth every morning (before breakfast), Disp: 30 capsule, Rfl: 0     valACYclovir (VALTREX) 1000 mg tablet, Take 2 tablets (2,000 mg) by mouth 2 times daily, Disp: 8 tablet, Rfl: prn     estradiol (VIVELLE-DOT) 0.1 MG/24HR BIW patch, Place 1 patch onto the skin once a week, Disp: 12 patch, Rfl: 4     estradiol (ESTRACE) 0.5 MG tablet, TAKE 1 OR 2 TABLETS BY MOUTH EVERY DAY AS NEEDED, Disp: 90 tablet, Rfl:  "3     loratadine (CLARITIN) 10 MG tablet, Take 10 mg by mouth as needed , Disp: , Rfl:    No Known Allergies    Past medical, surgical, social and family history were reviewed and updated in [facility].     ROS:   CONSTITUTIONAL:       NEGATIVE for fever, chills, change in weight  INTEGUMENTARY/SKIN:         NEGATIVE for worrisome rashes, moles or lesions  EYES:       NEGATIVE for vision changes or irritation  ENT/MOUTH:   NEGATIVE for ear, mouth and throat problems  RESP:       NEGATIVE for significant cough or SOB  CV:     NEGATIVE for chest pain, palpitations or peripheral edema  GI:      NEGATIVE for nausea, abdominal pain, heartburn, or change in bowel habits  :    NEGATIVE for frequency, dysuria, hematuria, vaginal discharge, or bleeding, incontinence   MUSCULOSKELETAL:       NEGATIVE for significant arthralgias or myalgia  NEURO:       NEGATIVE for weakness, dizziness or paresthesias  ENDORCRINE:       NEGATIVE for temperature intolerance, skin/hair changes  PSYCHIATRIC:       NEGATIVE for changes in mood or affect     EXAM:  /80  Pulse 60  Ht 5' 5.5\" (1.664 m)   BMI: There is no height or weight on file to calculate BMI.  Constitutional: healthy, alert and no distress  Head: Normocephalic. No masses, lesions, tenderness or abnormalities  Neck: Neck supple. Trachea midline. No adenopathy. Thyroid symmetric, normal size.   Cardiovascular: RRR.   Respiratory: lungs clear  Breast: Breasts reveal mild symmetric fibrocystic densities, but there are no dominant, discrete, fixed or suspicious masses found.   Gastrointestinal: Abdomen soft, non-tender, non-distended. No masses, organomegaly  Pelvic:  Vulva:  No external lesions, normal female hair distribution, no inguinal adenopathy.    Urethra:  Midline, non-tender, well supported, no discharge  Vagina:  Atrophic, no abnormal discharge, no lesions  Uterus:absent  Cervix: absent  Ovaries:  No masses appreciated, non-tender, mobile  Rectal Exam: neg for heme or " mass    Musculoskeletal: extremities normal  Skin: no suspicious lesions or rashes  Psychiatric: Affect appropriate, cooperative,mentation appears normal.     COUNSELING:     regular exercise   healthy diet/nutrition   Immunizations:   Folic Acid Counseling  Osteoporosis Prevention/Bone Health   reports that she has never smoked. She has never used smokeless tobacco.  Tobacco Cessation Action Plan: na  There is no height or weight on file to calculate BMI.  Weight management plan: Current exercise routine: n. Diet regimen was discussed and plan is n.     FRAX Risk Assessment  ASSESSMENT:  52 year old  with satisfactory annual exam    PLAN  ifob  Mammogram, Check MIIC  Colonoscopy due in   Flu done  Return to office: 1 yr      Greater than  0 minutes were spent on issues other than annual          Tracie Muñoz MD

## 2017-12-11 NOTE — PATIENT INSTRUCTIONS
IFOB take home test (screen for blood in stool) with instructions included given.    Mammogram to be scheduled. You will be contacted by hospital diagnostic imaging to make this appointment. Please call the nurse at [phone] if you have not been contacted in a timely manner to schedule.    Return for annual exam in 1 year.

## 2017-12-12 ASSESSMENT — ANXIETY QUESTIONNAIRES: GAD7 TOTAL SCORE: 0

## 2018-02-20 DIAGNOSIS — Z00.00 ROUTINE GENERAL MEDICAL EXAMINATION AT A HEALTH CARE FACILITY: ICD-10-CM

## 2018-02-20 LAB — HEMOCCULT SP1 STL QL: POSITIVE

## 2018-02-20 PROCEDURE — 82274 ASSAY TEST FOR BLOOD FECAL: CPT | Performed by: OBSTETRICS & GYNECOLOGY

## 2018-02-22 DIAGNOSIS — R19.5 POSITIVE FECAL OCCULT BLOOD TEST: Primary | ICD-10-CM

## 2018-03-06 ENCOUNTER — TELEPHONE (OUTPATIENT)
Dept: OBGYN | Facility: OTHER | Age: 53
End: 2018-03-06

## 2018-03-06 NOTE — TELEPHONE ENCOUNTER
Referral was placed for gastroenterology appointment with Dr Fontanez right now the visit is set up as a follow up visit with Dr Fontanez please call us back to see if this what the doctor wanted. The referral doesn't state what is needed and not in the note.

## 2018-03-06 NOTE — TELEPHONE ENCOUNTER
I called [facility] GI office and left message with Mavis Vidales stating that this was a referral for consultation. Dr. Muñoz wants the GI doc to see her first and decide if needs procedure. This was for + ifob test.

## 2018-04-20 ENCOUNTER — HOSPITAL ENCOUNTER (OUTPATIENT)
Dept: CT IMAGING | Facility: HOSPITAL | Age: 53
Discharge: HOME | End: 2018-04-20
Attending: INTERNAL MEDICINE | Admitting: INTERNAL MEDICINE
Payer: COMMERCIAL

## 2018-04-20 ENCOUNTER — RADIANT APPOINTMENT (OUTPATIENT)
Dept: MAMMOGRAPHY | Facility: OTHER | Age: 53
End: 2018-04-20
Attending: OBSTETRICS & GYNECOLOGY
Payer: COMMERCIAL

## 2018-04-20 DIAGNOSIS — K58.9 IRRITABLE BOWEL SYNDROME, UNSPECIFIED TYPE: ICD-10-CM

## 2018-04-20 DIAGNOSIS — Z12.31 ENCOUNTER FOR SCREENING MAMMOGRAM FOR BREAST CANCER: ICD-10-CM

## 2018-04-20 PROCEDURE — 74177 CT ABD & PELVIS W/CONTRAST: CPT | Mod: TC

## 2018-04-20 PROCEDURE — 77063 BREAST TOMOSYNTHESIS BI: CPT | Mod: TC

## 2018-04-20 PROCEDURE — 77067 SCR MAMMO BI INCL CAD: CPT | Mod: TC

## 2018-04-20 PROCEDURE — 25000128 H RX IP 250 OP 636: Performed by: RADIOLOGY

## 2018-04-20 RX ORDER — IOPAMIDOL 612 MG/ML
100 INJECTION, SOLUTION INTRAVASCULAR ONCE
Status: COMPLETED | OUTPATIENT
Start: 2018-04-20 | End: 2018-04-20

## 2018-04-20 RX ADMIN — IOPAMIDOL 100 ML: 612 INJECTION, SOLUTION INTRAVENOUS at 07:59

## 2018-04-20 RX ADMIN — DIATRIZOATE MEGLUMINE AND DIATRIZOATE SODIUM 30 ML: 660; 100 SOLUTION ORAL; RECTAL at 07:59

## 2018-08-08 ENCOUNTER — HOSPITAL ENCOUNTER (OUTPATIENT)
Facility: CLINIC | Age: 53
End: 2018-08-08
Attending: PODIATRIST | Admitting: PODIATRIST
Payer: COMMERCIAL

## 2018-10-18 ENCOUNTER — OFFICE VISIT (OUTPATIENT)
Dept: PODIATRY | Facility: OTHER | Age: 53
End: 2018-10-18
Attending: PODIATRIST
Payer: COMMERCIAL

## 2018-10-18 VITALS
DIASTOLIC BLOOD PRESSURE: 90 MMHG | WEIGHT: 176 LBS | BODY MASS INDEX: 28.84 KG/M2 | HEART RATE: 70 BPM | SYSTOLIC BLOOD PRESSURE: 150 MMHG | TEMPERATURE: 97.1 F

## 2018-10-18 DIAGNOSIS — M20.41 HAMMER TOE OF RIGHT FOOT: ICD-10-CM

## 2018-10-18 DIAGNOSIS — M62.462 GASTROCNEMIUS EQUINUS OF LEFT LOWER EXTREMITY: ICD-10-CM

## 2018-10-18 DIAGNOSIS — S99.922A PLANTAR PLATE INJURY, LEFT, INITIAL ENCOUNTER: Primary | ICD-10-CM

## 2018-10-18 DIAGNOSIS — M62.461 GASTROCNEMIUS EQUINUS OF RIGHT LOWER EXTREMITY: ICD-10-CM

## 2018-10-18 DIAGNOSIS — M20.42 HAMMER TOE OF LEFT FOOT: ICD-10-CM

## 2018-10-18 DIAGNOSIS — M79.672 LEFT FOOT PAIN: ICD-10-CM

## 2018-10-18 PROCEDURE — 99203 OFFICE O/P NEW LOW 30 MIN: CPT | Performed by: PODIATRIST

## 2018-10-18 ASSESSMENT — PAIN SCALES - GENERAL: PAINLEVEL: MODERATE PAIN (5)

## 2018-10-18 NOTE — NURSING NOTE
"Chief Complaint   Patient presents with     Consult For     left foot pain        Initial /90 (BP Location: Right arm, Patient Position: Chair, Cuff Size: Adult Regular)  Pulse 70  Temp 97.1  F (36.2  C) (Tympanic)  Wt 79.8 kg (176 lb)  BMI 28.84 kg/m2 Estimated body mass index is 28.84 kg/(m^2) as calculated from the following:    Height as of 12/11/17: 1.664 m (5' 5.5\").    Weight as of this encounter: 79.8 kg (176 lb).  Medication Reconciliation: complete    Marbella Hogue LPN    "

## 2018-10-18 NOTE — PROGRESS NOTES
Chief complaint: Patient presents with:  Consult For: left foot pain       History of Present Illness: This 53 year old female is seen for evaluation and suggestions of management of management of foot LEFT foot. Pain has been present for over five years. Dr. Lee Smith gave her an injection a couple times, but she sought a second opinion by Dr. Woody through [facility]. He also provided a couple injections which temporarily helped, but the pain came back. She was scheduled for surgery with Dr. Woody, but she thought she may want a second opinion before jumping into surgery and she cancelled the surgery. Dr. Woody then moved to [place]. She is now here to see if there are any conservative treatment options available for her pain. No further pedal complaints today.     /90 (BP Location: Right arm, Patient Position: Chair, Cuff Size: Adult Regular)  Pulse 70  Temp 97.1  F (36.2  C) (Tympanic)  Wt 79.8 kg (176 lb)  BMI 28.84 kg/m2    Patient Active Problem List   Diagnosis     Annual physical exam     Menopause     ACP (advance care planning)     NO SHOW     Status post WERNER-BSO       Past Surgical History:   Procedure Laterality Date     COLONOSCOPY N/A 11/6/2014    Procedure: COLONOSCOPY;  Surgeon: Quincy Fontanez MD;  [facility]     ESOPHAGOSCOPY, GASTROSCOPY, DUODENOSCOPY (EGD), COMBINED  8/9/2013    Procedure: COMBINED ESOPHAGOSCOPY, GASTROSCOPY, DUODENOSCOPY (EGD);   ESOPHAGOGASTRODUODENOSCOPY WITH BIOPSIES;  Surgeon: Quincy Fontanez MD;  Location: [facility]     HYSTERECTOMY TOTAL ABDOMINAL, BILATERAL SALPINGO-OOPHORECTOMY, COMBINED N/A 01/01/2004       Current Outpatient Prescriptions   Medication     estradiol (ESTRACE) 0.5 MG tablet     estradiol (VIVELLE-DOT) 0.1 MG/24HR BIW patch     valACYclovir (VALTREX) 1000 mg tablet     linaclotide (LINZESS) 145 MCG capsule     loratadine (CLARITIN) 10 MG tablet     No current facility-administered medications for this visit.         No  Known Allergies    Family History   Problem Relation Age of Onset     HEART DISEASE Mother 68     MI; cause of death     C.A.D. Father      Diabetes Father      Hypertension Father      Osteoarthritis Father        Social History     Social History     Marital status: Single     Spouse name: N/A     Number of children: N/A     Years of education: N/A     Social History Main Topics     Smoking status: Former Smoker     Types: Cigarettes     Smokeless tobacco: Never Used     Alcohol use 0.0 oz/week     Drug use: None     Sexual activity: Not Asked     Other Topics Concern     Caffeine Concern Yes     Parent/Sibling W/ Cabg, Mi Or Angioplasty Before 65f 55m? No     Social History Narrative       ROS: 10 point ROS neg other than the symptoms noted above in the HPI.  EXAM  Constitutional: healthy, alert and no distress    Psychiatric: mentation appears normal and affect normal/bright    VASCULAR:  -Dorsalis pedis pulse +2/4 b/l  -Posterior tibial pulse +2/4 b/l  -Capillary refill time < 3 seconds to b/l hallux  -Hair growth Present to b/l anterior legs and ankles  NEURO:  -Light touch sensation intact to b/l plantar forefoot  DERM:  -Skin temperature, texture and turgor WNL b/l  -Toenails normotrophic x 10  MSK:  -Pain on palpation to plantar LEFT foot 2nd metatarsal head  -LEFT 2nd digit dorsiflexed and medially deviated   -Dorsiflexion contracture to digits 2-5 b/l with LEFT 2nd digit the most dorsiflexed  -No pain to the 1st or 2nd interspace. No pain at the plantar sulcus of the 2nd digit.  -Muscle strength of ankles +5/5 for dorsiflexion, plantarflexion, ABDUction and ADDuction b/l  -Ankle joint passive ROM within normal limits except for dorsiflexion:    Dorsiflexion, RIGHT Straight knee 0 degrees    Dorsiflexion, LEFT Straight knee 0 degrees    ============================================================    ASSESSMENT:  (O22.542W) Plantar plate injury, left, initial encounter  (primary encounter  diagnosis)    (M79.672) Left foot pain    (M20.42) Hammer toe of left foot    (M20.41) Hammer toe of right foot    (M21.6X1) Gastrocnemius equinus of right lower extremity    (M21.6X2) Gastrocnemius equinus of left lower extremity      PLAN:  -Patient evaluated and examined. Treatment options discussed with no educational barriers noted.  -Discussed etiology of patient's pain as well as likely diagnoses (capsulitis vs. plantar plate)  -LEFT foot WB 3 views ordered to evaluate 2nd MTPJ deviation  -MRI ordered - looking for capsulitis vs. plantar plate tear  -Orthotic referral - patient will consider CMO with a metatarsal pad vs. OTC with a metatarsal pad depending on insurance coverage.  -Discussed metatarsal pads with patient. She wears a variety of shoes so she may look online for multiple pairs of metatarsal pads.  -Discussed surgical vs. Conservative treatment options. She would like to exhaust conservative treatments. Will attempt with a metatarsal pad and consider surgical options if this does not relieve her pain.  -Shoe Gear: Discussed proper shoe gear with patient. This involves wearing a stability shoe that bends at the toe, but has a solid midfoot shank and heel contour. She prefers lightweight shoes. She may wear lightweight if they are comfortable with a metatarsal pad, but if she does not find pain relief, she is encouraged to try a stability shoe.  -Stretching: Stressed the importance of stretching the calf muscles to increase dorsiflexion ROM at the ankles. Educated patient on how this contributes to plantar foot pain. If possible, patient should aim to stretch the calf muscles for a combined total of one hour per day.  -Patient inquired about a steroid injection. She has not had long-term relief in the past, and at this point it is not recommended given the potential of a plantar plate tear. No injection administered today.  -Patient in agreement with the above treatment plan and all of patient's  questions were answered.      RTC after MRI--patient will call to schedule        Greta Pacheco DPM

## 2018-10-18 NOTE — MR AVS SNAPSHOT
After Visit Summary   10/18/2018    Patience Mireles    MRN: [id number]           Patient Information     Date Of Birth          1965        Visit Information        Provider Department      10/18/2018 9:30 AM Greta Pacheco DPM [facility]        Today's Diagnoses     Plantar plate injury, left, initial encounter    -  1    Left foot pain        Hammer toe of left foot        Hammer toe of right foot        Gastrocnemius equinus of right lower extremity        Gastrocnemius equinus of left lower extremity           Follow-ups after your visit        Additional Services     ORTHOTICS REFERRAL       **This referral order prints off in the [facility]  (Orthotics & Prosthetics) Central Scheduling Office**    The [facility] Central Scheduling Staff will contact the patient to schedule appointments.     Central Scheduling Contact Information: [phone] ([place])    Orthotics: Patient has a 3-year history of LEFT 2nd plantar MTPJ pain. Suspecting a plantar plate tear. Please fit her for a custom insert that provides forefoot cushioning and a metatarsal pad. She is starting to develop a similar deformity on her RIGHT foot.  **Patient wears a lot of dress shoes but also walks with tennis shoes on a daily basis. If insurance will cover two pairs, please do one thin full-length insert with a met pad for her dress shoes along with a thicker, more supportive insert for her tennis shoes. If insurance covers one, she may opt for a dress shoe insert with an OTC Athletic Footstep with a metatarsal pad for her tennis shoe. If insurance does not cover inserts, please discuss with patient if she still wants a custom insert. She states she may want an OTC option (Footsteps, etc.) with a metatarsal pad applied to the insert OR just metatarsal pads for her shoes. Thank you!    Please be aware that coverage of these services is subject to the terms and  limitations of your health insurance plan.  Call member services at your health plan with any benefit or coverage questions.      Please bring the following to your appointment:    >>   Any x-rays, CTs or MRIs which have been performed.  Contact the facility where they were done to arrange for  prior to your scheduled appointment.    >>   List of current medications   >>   This referral request   >>   Any documents/labs given to you for this referral                  Your next 10 appointments already scheduled     Oct 30, 2018  7:15 AM CDT   (Arrive by 7:00 AM)   MR FOOT LEFT W/O & W CONTRAST with HIMR1   [facility] ([facility] )    [address] [address] [phone]           How do I prepare for my exam? (Food and drink instructions) **If you will be receiving sedation or general anesthesia, please see special notes below.**  How do I prepare for my exam? (Other instructions) Take your medicines as usual, unless your doctor tells you not to. You may or may not receive intravenous (IV) contrast for this exam pending the discretion of the Radiologist.  You do not need to do anything special to prepare.  **If you will be receiving sedation or general anesthesia, please see special notes below.**  What should I wear: The MRI machine uses a strong magnet. Please wear clothes without metal (snaps, zippers). A sweatsuit works well, or we may give you a hospital gown. Please remove any body piercings and hair extensions before you arrive. You will also remove watches, jewelry, hairpins, wallets, dentures, partial dental plates and hearing aids. You may wear contact lenses, and you may be able to wear your rings. We have a safe place to keep your personal items, but it is safer to leave them at home.  How long does the exam take: Most tests take 30 to 60 minutes.  HOWEVER, IF YOUR DOCTOR PRESCRIBES ANESTHESIA please plan on spending four to five hours in the recovery room.  What  should I bring:  Bring a list of your current medicines to your exam (including vitamins, minerals and over-the-counter drugs).  Do I need a :  **If you will be receiving sedation or general anesthesia, please see special notes below.**  What should I do after the exam: No Restrictions, You may resume normal activities.  What is this test: MRI (magnetic resonance imaging) uses a strong magnet and radio waves to look inside the body. An MRA (magnetic resonance angiogram) does the same thing, but it lets us look at your blood vessels. A computer turns the radio waves into pictures showing cross sections of the body, much like slices of bread. This helps us see any problems more clearly. You may receive fluid (called  contrast ) before or during your scan. The fluid helps us see the pictures better. We give the fluid through an IV (small needle in your arm).  Who should I call with questions:  Please call the Imaging Department at your exam site with any questions. Directions, parking instructions, and other information is available on our website, [email or web address].  How do I prepare if I m having sedation or anesthesia? **IMPORTANT** THE INSTRUCTIONS BELOW ARE ONLY FOR THOSE PATIENTS WHO HAVE BEEN TOLD THEY WILL RECEIVE SEDATION OR GENERAL ANESTHESIA DURING THEIR MRI PROCEDURE:  IF YOU WILL RECEIVE SEDATION (take medicine to help you relax during your exam): You must get the medicine from your doctor before you arrive. Bring the medicine to the exam. Do not take it at home. Arrive one hour early. Bring someone who can take you home after the test. Your medicine will make you sleepy. After the exam, you may not drive, take a bus or take a taxi by yourself. No eating 8 hours before your exam. You may have clear liquids up until 4 hours before your exam. (Clear liquids include water, clear tea, black coffee and fruit juice without pulp.)  IF YOU WILL RECEIVE ANESTHESIA (be asleep for your exam): Arrive 1 1/2  hours early. Bring someone who can take you home after the test. You may not drive, take a bus or take a taxi by yourself. No eating 8 hours before your exam. You may have clear liquids up until 4 hours before your exam. (Clear liquids include water, clear tea, black coffee and fruit juice without pulp.)              Who to contact     If you have questions or need follow up information about today's clinic visit or your schedule please contact [facility] directly at [phone].  Normal or non-critical lab and imaging results will be communicated to you by [employer], letter or phone within 4 business days after the clinic has received the results. If you do not hear from us within 7 days, please contact the clinic through [employer] or phone. If you have a critical or abnormal lab result, we will notify you by phone as soon as possible.  Submit refill requests through [employer] or call your pharmacy and they will forward the refill request to us. Please allow 3 business days for your refill to be completed.          Additional Information About Your Visit        Hitch [employer] Information     [employer] gives you secure access to your electronic health record. If you see a primary care provider, you can also send messages to your care team and make appointments. If you have questions, please call your primary care clinic.  If you do not have a primary care provider, please call [phone] and they will assist you.        Care EveryWhere ID     This is your Care EveryWhere ID. This could be used by other organizations to access your [facility] medical records  [id number]        Your Vitals Were     Pulse Temperature BMI (Body Mass Index)             70 97.1  F (36.2  C) (Tympanic) 28.84 kg/m2          Blood Pressure from Last 3 Encounters:   10/18/18 150/90   12/11/17 118/80   08/24/17 122/82    Weight from Last 3 Encounters:   10/18/18 176 lb (79.8 kg)   08/01/17 160 lb (72.6 kg)   06/15/17 160 lb (72.6  kg)              We Performed the Following     ORTHOTICS REFERRAL        Primary Care Provider Office Phone # Fax #    CAROLINA Mederos [phone]       [address] [place] [address]        Equal Access to Services     TASIA ARROYO : Hadii radha ku dannieo Soomaali, waaxda luqadaha, qaybta kaalmada adeegyada, niall bergeronn danny kirby jazmyne nguyen. So [facility] [phone].    ATENCIÓN: Si habla español, tiene a mckinney disposición servicios gratuitos de asistencia lingüística. [facility] [phone].    We comply with applicable federal civil rights laws and [place] laws. We do not discriminate on the basis of race, color, national origin, age, disability, sex, sexual orientation, or gender identity.            Thank you!     Thank you for choosing [facility]  for your care. Our goal is always to provide you with excellent care. Hearing back from our patients is one way we can continue to improve our services. Please take a few minutes to complete the written survey that you may receive in the mail after your visit with us. Thank you!             Your Updated Medication List - Protect others around you: Learn how to safely use, store and throw away your medicines at www.disposemymeds.org.          This list is accurate as of 10/18/18 11:59 PM.  Always use your most recent med list.                   Brand Name Dispense Instructions for use Diagnosis    * estradiol 0.5 MG tablet    ESTRACE    90 tablet    TAKE 1 OR 2 TABLETS BY MOUTH EVERY DAY AS NEEDED    Menopause       * estradiol 0.1 MG/24HR BIW patch    VIVELLE-DOT    12 patch    Place 1 patch onto the skin once a week    Menopause       linaclotide 145 MCG capsule    LINZESS    30 capsule    Take 1 capsule (145 mcg) by mouth every morning (before breakfast)    Flatulence, eructation, and gas pain       loratadine 10 MG tablet    CLARITIN     Take 10 mg by mouth as needed        valACYclovir 1000 mg tablet     VALTREX    8 tablet    Take 2 tablets (2,000 mg) by mouth 2 times daily    Oral herpes       * Notice:  This list has 2 medication(s) that are the same as other medications prescribed for you. Read the directions carefully, and ask your doctor or other care provider to review them with you.

## 2018-10-19 ENCOUNTER — RADIANT APPOINTMENT (OUTPATIENT)
Dept: GENERAL RADIOLOGY | Facility: OTHER | Age: 53
End: 2018-10-19
Attending: PODIATRIST
Payer: COMMERCIAL

## 2018-10-19 DIAGNOSIS — M20.42 HAMMER TOE OF LEFT FOOT: ICD-10-CM

## 2018-10-19 DIAGNOSIS — S99.922A PLANTAR PLATE INJURY, LEFT, INITIAL ENCOUNTER: ICD-10-CM

## 2018-10-19 PROCEDURE — 73630 X-RAY EXAM OF FOOT: CPT | Mod: TC

## 2018-10-31 ENCOUNTER — HOSPITAL ENCOUNTER (OUTPATIENT)
Dept: MRI IMAGING | Facility: HOSPITAL | Age: 53
Discharge: HOME | End: 2018-10-31
Attending: PODIATRIST | Admitting: PODIATRIST
Payer: COMMERCIAL

## 2018-10-31 DIAGNOSIS — S99.922A PLANTAR PLATE INJURY, LEFT, INITIAL ENCOUNTER: ICD-10-CM

## 2018-10-31 DIAGNOSIS — M20.42 HAMMER TOE OF LEFT FOOT: ICD-10-CM

## 2018-10-31 PROCEDURE — 73718 MRI LOWER EXTREMITY W/O DYE: CPT | Mod: TC,LT

## 2018-12-07 ENCOUNTER — OFFICE VISIT (OUTPATIENT)
Dept: PODIATRY | Facility: OTHER | Age: 53
End: 2018-12-07
Attending: PODIATRIST
Payer: COMMERCIAL

## 2018-12-07 VITALS
OXYGEN SATURATION: 99 % | BODY MASS INDEX: 28.19 KG/M2 | TEMPERATURE: 97.8 F | DIASTOLIC BLOOD PRESSURE: 82 MMHG | HEART RATE: 69 BPM | WEIGHT: 172 LBS | SYSTOLIC BLOOD PRESSURE: 130 MMHG

## 2018-12-07 DIAGNOSIS — M20.42 HAMMER TOE OF LEFT FOOT: ICD-10-CM

## 2018-12-07 DIAGNOSIS — M79.672 LEFT FOOT PAIN: ICD-10-CM

## 2018-12-07 DIAGNOSIS — M87.075: ICD-10-CM

## 2018-12-07 DIAGNOSIS — M20.41 HAMMER TOE OF RIGHT FOOT: ICD-10-CM

## 2018-12-07 DIAGNOSIS — M62.462 GASTROCNEMIUS EQUINUS OF LEFT LOWER EXTREMITY: ICD-10-CM

## 2018-12-07 DIAGNOSIS — M62.461 GASTROCNEMIUS EQUINUS OF RIGHT LOWER EXTREMITY: ICD-10-CM

## 2018-12-07 PROCEDURE — 99213 OFFICE O/P EST LOW 20 MIN: CPT | Mod: 25 | Performed by: PODIATRIST

## 2018-12-07 PROCEDURE — 20600 DRAIN/INJ JOINT/BURSA W/O US: CPT | Performed by: PODIATRIST

## 2018-12-07 RX ORDER — ESTRADIOL 0.03 MG/D
FILM, EXTENDED RELEASE TRANSDERMAL
COMMUNITY
Start: 2012-02-15 | End: 2019-03-25

## 2018-12-07 ASSESSMENT — PAIN SCALES - GENERAL: PAINLEVEL: MILD PAIN (3)

## 2018-12-07 NOTE — NURSING NOTE
"Chief Complaint   Patient presents with     Musculoskeletal Problem     left foot        Initial There were no vitals taken for this visit. Estimated body mass index is 28.84 kg/(m^2) as calculated from the following:    Height as of 12/11/17: 5' 5.5\" (1.664 m).    Weight as of 10/18/18: 176 lb (79.8 kg).  Medication Reconciliation: complete    Ila Jimenez LPN  "

## 2018-12-07 NOTE — PROGRESS NOTES
The following medication was given:     The following medication was given:     MEDICATION: Ketorolac Tromethamine 60MG/2ML (30 mg/mL) (Toradol)  ROUTE: ID  SITE: left foot   DOSE: 1.5 ml  LOT #: [id number]  :  [employer]  EXPIRATION DATE:  06/20  NDC#: 33191-066-23

## 2018-12-07 NOTE — PROGRESS NOTES
Chief complaint: Patient presents with:  Musculoskeletal Problem: left foot       History of Present Illness: This 53 year old female is seen for follow-up evaluation and suggestions of management of foot LEFT foot. She recently had an MRI and she is here to discuss the results and treatment plans based on the results. Her pain has not decreased since her last visit and she still has moderate-to-severe pain under the 2nd metatarsal head. She purchased some inserts online that had small metatarsal pads built into the insert but it does seem to have made a difference in pain. She has modified her shoe gear, but she always has pain while walking. No further pedal complaints at this time.    History of pain: Pain has been present for over five years. Dr. Lee Smith gave her an injection a couple times, but she sought a second opinion by Dr. Woody through [facility]. He also provided a couple injections which temporarily helped, but the pain came back. She was scheduled for surgery with Dr. Woody, but she thought she may want a second opinion before jumping into surgery and she cancelled the surgery. Dr. Woody then moved to [place]. She presented to this clinic for an opinion on what she should have done.       /82 (BP Location: Right arm, Patient Position: Sitting, Cuff Size: Adult Regular)  Pulse 69  Temp 97.8  F (36.6  C) (Tympanic)  Wt 172 lb (78 kg)  SpO2 99%  BMI 28.19 kg/m2    Patient Active Problem List   Diagnosis     Annual physical exam     Menopause     ACP (advance care planning)     NO SHOW     Status post WERNER-BSO       Past Surgical History:   Procedure Laterality Date     COLONOSCOPY N/A 11/6/2014    Procedure: COLONOSCOPY;  Surgeon: Quincy Fontanez MD;  [facility]: [facility]     ESOPHAGOSCOPY, GASTROSCOPY, DUODENOSCOPY (EGD), COMBINED  8/9/2013    Procedure: COMBINED ESOPHAGOSCOPY, GASTROSCOPY, DUODENOSCOPY (EGD);   ESOPHAGOGASTRODUODENOSCOPY WITH BIOPSIES;  Surgeon: Huseyin  Quincy HOLDEN MD;  Location: [facility]     HYSTERECTOMY TOTAL ABDOMINAL, BILATERAL SALPINGO-OOPHORECTOMY, COMBINED N/A 01/01/2004       Current Outpatient Prescriptions   Medication     estradiol (VIVELLE-DOT) 0.025 MG/24HR bi-weekly patch     linaclotide (LINZESS) 145 MCG capsule     loratadine (CLARITIN) 10 MG tablet     valACYclovir (VALTREX) 1000 mg tablet     estradiol (VIVELLE-DOT) 0.1 MG/24HR BIW patch     [DISCONTINUED] estradiol (ESTRACE) 0.5 MG tablet     No current facility-administered medications for this visit.         No Known Allergies    Family History   Problem Relation Age of Onset     Heart Disease Mother 68     MI; cause of death     C.A.D. Father      Diabetes Father      Hypertension Father      Osteoarthritis Father        Social History     Social History     Marital status: Single     Spouse name: N/A     Number of children: N/A     Years of education: N/A     Social History Main Topics     Smoking status: Former Smoker     Types: Cigarettes     Smokeless tobacco: Never Used     Alcohol use 0.0 oz/week     Drug use: None     Sexual activity: Not Asked     Other Topics Concern     Caffeine Concern Yes     Parent/Sibling W/ Cabg, Mi Or Angioplasty Before 65f 55m? No     Social History Narrative       ROS: 10 point ROS neg other than the symptoms noted above in the HPI.  EXAM  Constitutional: healthy, alert and no distress     Psychiatric: mentation appears normal and affect normal/bright     VASCULAR:  -Dorsalis pedis pulse +2/4 b/l  -Posterior tibial pulse +2/4 b/l  -Capillary refill time < 3 seconds to b/l hallux  -Hair growth Present to b/l anterior legs and ankles  NEURO:  -Light touch sensation intact to b/l plantar forefoot  DERM:  -Skin temperature, texture and turgor WNL b/l  -Toenails normotrophic x 10  MSK:  -Pain on palpation to plantar LEFT foot 2nd metatarsal head  -LEFT 2nd digit dorsiflexed and medially deviated   -Dorsiflexion contracture to digits 2-5 b/l with LEFT 2nd digit the  most dorsiflexed  -No pain to the 1st or 2nd interspace. No pain at the plantar sulcus of the 2nd digit.  -Muscle strength of ankles +5/5 for dorsiflexion, plantarflexion, ABDUction and ADDuction b/l  -Ankle joint passive ROM within normal limits except for dorsiflexion:    Dorsiflexion, RIGHT Straight knee 0 degrees    Dorsiflexion, LEFT Straight knee 0 degrees     MRI LEFT FOOT 10/31/2018  IMPRESSION:   1. 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  2. Mild to moderate degenerative change in the first metatarsal  phalangeal joint.     YARELY OLIVIER MD  ============================================================     ASSESSMENT:  (H97.461D) Plantar plate injury, left, initial encounter  (primary encounter diagnosis)     (M79.672) Left foot pain     (M20.42) Hammer toe of left foot     (M20.41) Hammer toe of right foot     (M21.6X1) Gastrocnemius equinus of right lower extremity     (M21.6X2) Gastrocnemius equinus of left lower extremity       PLAN:  -Patient evaluated and examined. Treatment options discussed with no educational barriers noted.  -Reviewed MRI results with patient and explained the findings. Patient has exhausted most conservative treatment options and she may have the most pain relief at this time with surgical correction. Discussed post-operative course of a weil vs. Cartiva implant. Discussed risks and benefits of surgical options. She would like to have surgery in March. Will have her return in early March to discuss surgical options again.  -Patient is advised to purchase a more prominent metatarsal pad for her foot  -Orthotic referral - CMO ordered with a metatarsal pad.  -Injection: Obtained written and verbal consent from patient for an anti-inflammatory injection into the 2nd MTPJ of the Left foot. Reviewed risks and benefits of the injection. Informed patient that the injection may decrease pain long-term, short-term,  or not at all. Patient in agreement with an injection today in an effort to slow or reverse the chronic inflammatory process and pain in the foot. Injected a total of 1.5mL mL of 1:1 of Ketorloac (60mg/2mL) and 2% Lidocaine plain. Patient tolerated the injection well. A steroid is not recommended at this time given her history of previous steroid injections and an increased risk of plantar plate tear. She still has a risk of plantar plate tear with the anti-inflammatory injection, but she would like an injection to help decrease her pain and she opted for the injection.  -Patient in agreement with the above treatment plan and all of patient's questions were answered.     RTC early March, 2019 to discuss and set up surgery for March, 2019    Greta Pacheco DPM

## 2018-12-10 RX ORDER — DEXAMETHASONE SODIUM PHOSPHATE 4 MG/ML
0.75 INJECTION, SOLUTION INTRA-ARTICULAR; INTRALESIONAL; INTRAMUSCULAR; INTRAVENOUS; SOFT TISSUE ONCE
Qty: 0.75 ML | Refills: 0 | Status: CANCELLED | OUTPATIENT
Start: 2018-12-10 | End: 2018-12-10

## 2018-12-11 RX ORDER — KETOROLAC TROMETHAMINE 30 MG/ML
22.5 INJECTION, SOLUTION INTRAMUSCULAR; INTRAVENOUS ONCE
Qty: 0.75 ML | Refills: 0 | Status: SHIPPED | OUTPATIENT
Start: 2018-12-11 | End: 2019-03-25

## 2019-02-14 DIAGNOSIS — B00.2 ORAL HERPES: ICD-10-CM

## 2019-02-14 RX ORDER — VALACYCLOVIR HYDROCHLORIDE 1 G/1
TABLET, FILM COATED ORAL
Qty: 8 TABLET | Status: SHIPPED | OUTPATIENT
Start: 2019-02-14 | End: 2019-03-25

## 2019-02-14 NOTE — TELEPHONE ENCOUNTER
valACYclovir (VALTREX) 1000 mg tablet  Last Written Prescription Date:  12/11/2017  Last Fill Quantity: 8,   # refills: 0  Last Office Visit: 08/24/2017  Future Office visit:    Next 5 appointments (look out 90 days)    Mar 12, 2019  9:30 AM CDT  (Arrive by 9:15 AM)  Return Visit with Greta Pacheco DPM  [facility] [address] [place] [id number] [phone]           Routing refill request to provider for review/approval because:

## 2019-03-12 ENCOUNTER — OFFICE VISIT (OUTPATIENT)
Dept: PODIATRY | Facility: OTHER | Age: 54
End: 2019-03-12
Attending: PODIATRIST
Payer: COMMERCIAL

## 2019-03-12 VITALS
DIASTOLIC BLOOD PRESSURE: 84 MMHG | HEART RATE: 72 BPM | SYSTOLIC BLOOD PRESSURE: 120 MMHG | TEMPERATURE: 98.3 F | OXYGEN SATURATION: 99 %

## 2019-03-12 DIAGNOSIS — M62.462 GASTROCNEMIUS EQUINUS OF LEFT LOWER EXTREMITY: ICD-10-CM

## 2019-03-12 DIAGNOSIS — M92.72 OSTEOCHONDROSIS OF HEAD OF SECOND METATARSAL BONE OF LEFT FOOT: ICD-10-CM

## 2019-03-12 DIAGNOSIS — M79.672 LEFT FOOT PAIN: ICD-10-CM

## 2019-03-12 DIAGNOSIS — M20.41 HAMMER TOE OF RIGHT FOOT: ICD-10-CM

## 2019-03-12 DIAGNOSIS — M62.461 GASTROCNEMIUS EQUINUS OF RIGHT LOWER EXTREMITY: ICD-10-CM

## 2019-03-12 DIAGNOSIS — M20.42 HAMMER TOE OF LEFT FOOT: ICD-10-CM

## 2019-03-12 DIAGNOSIS — S99.922D PLANTAR PLATE INJURY, LEFT, SUBSEQUENT ENCOUNTER: Primary | ICD-10-CM

## 2019-03-12 PROCEDURE — 99213 OFFICE O/P EST LOW 20 MIN: CPT | Performed by: PODIATRIST

## 2019-03-12 NOTE — PROGRESS NOTES
Chief complaint: Patient presents with:  Schedule Surgery      History of Present Illness: This 53 year old female is seen for follow-up evaluation and suggestions of management of foot LEFT foot. She had an MRI on 10/31/2018 which revealed AVN of the LEFT 2nd metatarsal head. She says she recently went on vacation and she was in a lot of pain throughout the entire vacation. Her overall pain in the foot has worsened and she is hoping to have it surgically corrected as soon as possible. No further pedal complaints at this time.     History of pain: Pain has been present for over five years. Dr. Lee Smith gave her an injection a couple times, but she sought a second opinion by Dr. Woody through [facility]. He also provided a couple injections which temporarily helped, but the pain came back. She was scheduled for surgery with Dr. Woody, but she thought she may want a second opinion before jumping into surgery and she cancelled the surgery. Dr. Woody then moved to [place]. She presented to this clinic for an opinion on what she should have done.        /84   Pulse 72   Temp 98.3  F (36.8  C)   SpO2 99%     Patient Active Problem List   Diagnosis     Annual physical exam     Menopause     ACP (advance care planning)     NO SHOW     Status post WERNER-BSO       Past Surgical History:   Procedure Laterality Date     COLONOSCOPY N/A 11/6/2014    Procedure: COLONOSCOPY;  Surgeon: Quincy Fontanez MD;  Location: [facility]     ESOPHAGOSCOPY, GASTROSCOPY, DUODENOSCOPY (EGD), COMBINED  8/9/2013    Procedure: COMBINED ESOPHAGOSCOPY, GASTROSCOPY, DUODENOSCOPY (EGD);   ESOPHAGOGASTRODUODENOSCOPY WITH BIOPSIES;  Surgeon: Quincy Fontanez MD;  Location: [facility]     HYSTERECTOMY TOTAL ABDOMINAL, BILATERAL SALPINGO-OOPHORECTOMY, COMBINED N/A 01/01/2004       Current Outpatient Medications   Medication     estradiol (VIVELLE-DOT) 0.1 MG/24HR BIW patch     loratadine (CLARITIN) 10 MG tablet     order for DME      valACYclovir (VALTREX) 1000 mg tablet     estradiol (VIVELLE-DOT) 0.025 MG/24HR bi-weekly patch     linaclotide (LINZESS) 145 MCG capsule     No current facility-administered medications for this visit.         No Known Allergies    Family History   Problem Relation Age of Onset     Heart Disease Mother 68        MI; cause of death     C.A.D. Father      Diabetes Father      Hypertension Father      Osteoarthritis Father        Social History     Socioeconomic History     Marital status: Single     Spouse name: Not on file     Number of children: Not on file     Years of education: Not on file     Highest education level: Not on file   Occupational History     Not on file   Social Needs     Financial resource strain: Not on file     Food insecurity:     Worry: Not on file     Inability: Not on file     Transportation needs:     Medical: Not on file     Non-medical: Not on file   Tobacco Use     Smoking status: Former Smoker     Types: Cigarettes     Smokeless tobacco: Never Used   Substance and Sexual Activity     Alcohol use: Yes     Alcohol/week: 0.0 oz     Drug use: Not on file     Sexual activity: Not on file   Lifestyle     Physical activity:     Days per week: Not on file     Minutes per session: Not on file     Stress: Not on file   Relationships     Social connections:     Talks on phone: Not on file     Gets together: Not on file     Attends Bahai service: Not on file     Active member of club or organization: Not on file     Attends meetings of clubs or organizations: Not on file     Relationship status: Not on file     Intimate partner violence:     Fear of current or ex partner: Not on file     Emotionally abused: Not on file     Physically abused: Not on file     Forced sexual activity: Not on file   Other Topics Concern      Service Not Asked     Blood Transfusions Not Asked     Caffeine Concern Yes     Occupational Exposure Not Asked     Hobby Hazards Not Asked     Sleep Concern Not Asked      Stress Concern Not Asked     Weight Concern Not Asked     Special Diet Not Asked     Back Care Not Asked     Exercise Not Asked     Bike Helmet Not Asked     Seat Belt Not Asked     Self-Exams Not Asked     Parent/sibling w/ CABG, MI or angioplasty before 65F 55M? No   Social History Narrative     Not on file       ROS: 10 point ROS neg other than the symptoms noted above in the HPI.  EXAM  Constitutional: healthy, alert and no distress     Psychiatric: mentation appears normal and affect normal/bright     VASCULAR:  -Dorsalis pedis pulse +2/4 b/l  -Posterior tibial pulse +2/4 b/l  -Capillary refill time < 3 seconds to b/l hallux  -Hair growth Present to b/l anterior legs and ankles    NEURO:  -Light touch sensation intact to b/l plantar forefoot    DERM:  -Skin temperature, texture and turgor WNL b/l  -Toenails normotrophic x 10    MSK:  -Pain on palpation to plantar LEFT foot 2nd metatarsal head  -LEFT 2nd digit dorsiflexed and medially deviated   -Dorsiflexion contracture to digits 2-5 b/l with LEFT 2nd digit the most dorsiflexed  -No pain to the 1st or 2nd interspace. No pain at the plantar sulcus of the 2nd digit.  -Muscle strength of ankles +5/5 for dorsiflexion, plantarflexion, ABDUction and ADDuction b/l  -Ankle joint passive ROM within normal limits except for dorsiflexion:    Dorsiflexion, RIGHT Straight knee 0 degrees    Dorsiflexion, LEFT Straight knee 0 degrees      MRI LEFT FOOT 10/31/2018  IMPRESSION:   1. 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  2. Mild to moderate degenerative change in the first metatarsal  phalangeal joint.     YARELY OLIVIER MD  ============================================================     ASSESSMENT:  (M92.72) Osteochondrosis of head of second metatarsal bone of left foot  (primary encounter diagnosis)    (M79.672) Left foot pain    (S99.222D) Plantar plate injury, left, subsequent  encounter    (M20.42) Hammer toe of left foot    (M20.41) Hammer toe of right foot    (M21.6X2) Gastrocnemius equinus of left lower extremity    (M21.6X1) Gastrocnemius equinus of right lower extremity         PLAN:  -Patient evaluated and examined. Treatment options discussed with no educational barriers noted.  -Patient has attempted offloading inserts and she has modified shoe gear. Her toe pain is affecting her daily activities and she recently had a lot of pain while on vacation.   -Discussed surgical options with the patient. A metatarsal head would eliminate the pain at that joint, but it will not spare the joint, the 2nd toe could become dorsally contracted and she may developed transfer metatarsal head pain. Discussed a weil osteotomy with potential plantar plate repair if visualized intra-operatively. Although the MRI did not reveal a plantar plate tear, there is still suspicion for a tear due to the deviation of her toe. She would like to undergo this corrective, joint salvage surgery. Discussed potential risks (continued pain, post-op infection, DORSIFLEXION contracture of toe, longer post op recovery) and benefits (decreased pain, less deviated toe). She is agreeable with this plan.  -Surgery 04/01/2019: Left foot second digit weil osteotomy with possible plantar plate repair  ---Reason for surgery: Pain and avascular necrosis of left second toe (metatarsal)  -Post-op appointment Tuesday, 04/09/2019  -Percocet 5/325mg to be dispensed the day of surgery  -DME Order for crutches -- patient to bring these the day of surgery. She declines training for the crutches.  -CAM walker boot to be dispensed the day of surgery. She understands she will be at least partially WB in a boot for 6 weeks with NWB for a minimum of 3 weeks post surgery.     -Will consider a carbon fiber plate for shoe gear when patient is transitioning into surgery  -Patient in agreement with the above treatment plan and all of patient's  questions were answered.       RTC Post-op Nadeem, 04/09/2019      Greta Pacheco DPM

## 2019-03-20 NOTE — H&P (VIEW-ONLY)
[facility] [place]  [address]  [place] [address]  [phone]  Dept: [phone]    PRE-OP EVALUATION:  Today's date: 3/25/2019    Patience Mireles (: 1965) presents for pre-operative evaluation assessment as requested by Dr. Pacheco.  She requires evaluation and anesthesia risk assessment prior to undergoing surgery/procedure for treatment of LEFT FOOT SECOND DIGIT WEIL OSTEOTOMY WITH POSSIBLE PLANTAR PLATE REPAIR  .    Proposed Surgery/ Procedure: LEFT FOOT SECOND DIGIT WEIL OSTEOTOMY WITH POSSIBLE PLANTAR PLATE REPAIR  Date of Surgery/ Procedure: 19  Time of Surgery/ Procedure: [facility]  Hospital/Surgical Facility: [facility]  Primary Physician: Morelia Rosenthal  Type of Anesthesia Anticipated: to be determined    Patient has a Health Care Directive or Living Will:  NO    1. NO - Do you have a history of heart attack, stroke, stent, bypass or surgery on an artery in the head, neck, heart or legs?  2. NO - Do you ever have any pain or discomfort in your chest?  3. NO - Do you have a history of  Heart Failure?  4. NO - Are you troubled by shortness of breath when: walking on the level, up a slight hill or at night?  5. NO - Do you currently have a cold, bronchitis or other respiratory infection?  6. NO - Do you have a cough, shortness of breath or wheezing?  7. NO - Do you sometimes get pains in the calves of your legs when you walk?  8. NO - Do you or anyone in your family have previous history of blood clots?  9. NO - Do you or does anyone in your family have a serious bleeding problem such as prolonged bleeding following surgeries or cuts?  10. NO - Have you ever had problems with anemia or been told to take iron pills?  11. NO - Have you had any abnormal blood loss such as black, tarry or bloody stools, or abnormal vaginal bleeding?  12. NO - Have you ever had a blood transfusion?  13. NO - Have you or any of your relatives ever had problems with anesthesia?  14. NO - Do you have  sleep apnea, excessive snoring or daytime drowsiness?  15. NO - Do you have any prosthetic heart valves?  16. NO - Do you have prosthetic joints?  17. NO - Is there any chance that you may be pregnant?      HPI:     HPI related to upcoming procedure: left foot necrosis of metatarsal bone. Will be having surgical repair of this by Dr. Pardo. Will be under general anesthesia       See problem list for active medical problems.  Problems all longstanding and stable, except as noted/documented.  See ROS for pertinent symptoms related to these conditions.                                                                                                                                                          .    MEDICAL HISTORY:     Patient Active Problem List    Diagnosis Date Noted     Status post WERNER-BSO 08/01/2017     Priority: Medium     NO SHOW 12/14/2016     Priority: Medium      Patient states she called and cancelled.  No shows for Dr. Muñoz : 12/14/16         ACP (advance care planning) 03/11/2016     Priority: Medium     Advance Care Planning 3/11/2016: ACP Review of Chart / Resources Provided:  Reviewed chart for advance care plan.  Patience SWAIN Khangalonso has been provided information and resources to begin or update their advance care plan.  Added by Esther Sanchez             Annual physical exam 07/31/2013     Priority: Medium     Menopause 07/31/2013     Priority: Medium     Iatrogenic menopause 2004        Past Medical History:   Diagnosis Date     Asymptomatic varicose veins 7/22/2003     Carcinoma in situ of cervix uteri 9/20/2000     Chest pain, unspecified 6/5/2004     Dysmenorrhea 8/21/2000     Endometriosis of uterus 12/20/2000     Endometriosis, site unspecified 10/26/2000     Follow-up examination, following unspecified surgery 4/16/2001     Leukorrhea, not specified as infective 7/11/2007     Need for prophylactic vaccination and inoculation against viral hepatitis 1/20/2009     Nonallopathic  lesion of cervical region, not elsewhere classified 2/23/2001     Other screening mammogram 8/29/2000     PONV (postoperative nausea and vomiting)      Routine general medical examination at a health care facility 12/19/2001     Past Surgical History:   Procedure Laterality Date     COLONOSCOPY N/A 11/6/2014    Procedure: COLONOSCOPY;  Surgeon: Quincy Fontanez MD;  [facility]: [facility]     ESOPHAGOSCOPY, GASTROSCOPY, DUODENOSCOPY (EGD), COMBINED  8/9/2013    Procedure: COMBINED ESOPHAGOSCOPY, GASTROSCOPY, DUODENOSCOPY (EGD);   ESOPHAGOGASTRODUODENOSCOPY WITH BIOPSIES;  Surgeon: Quincy Fontanez MD;  Location: [facility]     HYSTERECTOMY TOTAL ABDOMINAL, BILATERAL SALPINGO-OOPHORECTOMY, COMBINED N/A 01/01/2004     Current Outpatient Medications   Medication Sig Dispense Refill     estradiol (VIVELLE-DOT) 0.1 MG/24HR BIW patch Place 1 patch onto the skin once a week 12 patch 4     linaclotide (LINZESS) 145 MCG capsule Take 1 capsule (145 mcg) by mouth every morning (before breakfast) 30 capsule 0     loratadine (CLARITIN) 10 MG tablet Take 10 mg by mouth as needed        order for DME Equipment being ordered: Please dispense crutches for patient's upcoming foot surgery on 04/01/2019 1 Device 0     valACYclovir (VALTREX) 1000 mg tablet TAKE 2 TABLETS BY MOUTH 2 TIMES A DAY 8 tablet PRN     OTC products: None, except as noted above    No Known Allergies   Latex Allergy: NO    Social History     Tobacco Use     Smoking status: Former Smoker     Types: Cigarettes     Smokeless tobacco: Never Used   Substance Use Topics     Alcohol use: Yes     Alcohol/week: 0.0 oz     History   Drug Use Not on file       REVIEW OF SYSTEMS:   CONSTITUTIONAL: NEGATIVE for fever, chills, change in weight  INTEGUMENTARY/SKIN: NEGATIVE for worrisome rashes, moles or lesions  EYES: NEGATIVE for vision changes or irritation  ENT/MOUTH: NEGATIVE for ear, mouth and throat problems  RESP: NEGATIVE for significant cough or SOB  BREAST: NEGATIVE for masses,  "tenderness or discharge  CV: NEGATIVE for chest pain, palpitations or peripheral edema  GI: NEGATIVE for nausea, abdominal pain, heartburn, or change in bowel habits  : NEGATIVE for frequency, dysuria, or hematuria  MUSCULOSKELETAL:thickness and walking on a pebble. And has  and myalgia of her foot.   NEURO: altered sensation of her foot due to the nerve impingement of this current disease state.   ENDOCRINE: NEGATIVE for temperature intolerance, skin/hair changes  HEME: NEGATIVE for bleeding problems  PSYCHIATRIC: NEGATIVE for changes in mood or affect    EXAM:   /88 (BP Location: Left arm, Patient Position: Sitting, Cuff Size: Adult Regular)   Pulse 88   Temp 98.8  F (37.1  C)   Ht 1.664 m (5' 5.5\")   Wt 81.2 kg (179 lb)   SpO2 98%   BMI 29.33 kg/m      GENERAL APPEARANCE: healthy, alert and no distress     EYES: EOMI, PERRL     HENT: ear canals and TM's normal and nose and mouth without ulcers or lesions     NECK: no adenopathy, no asymmetry, masses, or scars and thyroid normal to palpation     RESP: lungs clear to auscultation - no rales, rhonchi or wheezes     CV: regular rates and rhythm, normal S1 S2, no S3 or S4 and no murmur, click or rub     ABDOMEN:  soft, nontender, no HSM or masses and bowel sounds normal     MS: extremities normal- no gross deformities noted, no evidence of inflammation in joints, FROM in all extremities.     SKIN: no suspicious lesions or rashes     NEURO: Normal strength and tone, sensory exam grossly normal, mentation intact and speech normal     PSYCH: mentation appears normal. and affect normal/bright     LYMPHATICS: No cervical adenopathy    DIAGNOSTICS:     EKG: appears normal, NSR, normal axis, normal intervals, no acute ST/T changes c/w ischemia, no LVH by voltage criteria, unchanged from previous tracings  Labs Resulted Today:   Results for orders placed or performed during the hospital encounter of 10/31/18    Foot Left w/o Contrast    Narrative    PROCEDURE: " MR FOOT LEFT W/O CONTRAST 10/31/2018 3:47 PM    HISTORY: 3 year h/o Lf plantar 2nd metatarsal head pain with medial  and dorsal deviation at the MTPJ. No improvement of pain. High  suspicion for plantar plate tear. Please evaluate for plantar plate  tear and/or capsulitis.; Plantar plate injury, left, initial  encounter; Hammer toe of left foot    COMPARISONS: None.    Meds/Dose Given: None.    TECHNIQUE: Sagittal, coronal and axial images were obtained with  combination of T1, proton density, T2 and STIR sequences.    FINDINGS: Metatarsal head is abnormal. There is prominent marrow edema  with subchondral cystic change, articular cartilage loss and some  flattening and collapse of the metatarsal head more severe dorsally.  Findings are probably due to avascular necrosis with secondary  degenerative change.    There is mild to moderate degenerative change in the first metatarsal  phalangeal joint as well.    No flexor or extensor tendon abnormality is seen. There is no  abnormality of the plantar plate. There is no soft tissue mass.         Impression    IMPRESSION:   1. 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  2. Mild to moderate degenerative change in the first metatarsal  phalangeal joint.    YARELY OLIVIER MD       Recent Labs   Lab Test 08/24/17  1240 03/11/16  1426 03/11/16  0924 09/17/14  0746   HGB 13.6  --  12.6 13.2     --  304 344    142  --   --    POTASSIUM 3.9 3.9  --   --    CR 0.65 0.70  --   --    A1C  --   --  5.2 5.3        IMPRESSION:   Reason for surgery/procedure: She is going to have repair of her left foot   Diagnosis/reason for consult: medical clearance.     The proposed surgical procedure is considered INTERMEDIATE risk.    REVISED CARDIAC RISK INDEX  The patient has the following serious cardiovascular risks for perioperative complications such as (MI, PE, VFib and 3  AV Block):  No serious cardiac  risks  INTERPRETATION: 0 risks: Class I (very low risk - 0.4% complication rate)    The patient has the following additional risks for perioperative complications:  No identified additional risks      ICD-10-CM    1. Preop general physical exam Z01.818 EKG 12-lead complete w/read - (Clinic Performed)     CBC with platelets and differential   2. Dysuria R30.0 UA reflex to Microscopic and Culture - [place]       RECOMMENDATIONS:         On no chronic meds. Can continue a patch. And she is off her NSAID.     APPROVAL GIVEN to proceed with proposed procedure, without further diagnostic evaluation       Signed Electronically by: CAROLINA Goldberg    Copy of this evaluation report is provided to requesting physician.    [facility] Preop Guidelines    Revised Cardiac Risk Index

## 2019-03-25 ENCOUNTER — OFFICE VISIT (OUTPATIENT)
Dept: FAMILY MEDICINE | Facility: OTHER | Age: 54
End: 2019-03-25
Attending: PHYSICIAN ASSISTANT
Payer: COMMERCIAL

## 2019-03-25 VITALS
WEIGHT: 179 LBS | HEART RATE: 88 BPM | HEIGHT: 66 IN | OXYGEN SATURATION: 98 % | SYSTOLIC BLOOD PRESSURE: 134 MMHG | BODY MASS INDEX: 28.77 KG/M2 | DIASTOLIC BLOOD PRESSURE: 88 MMHG | TEMPERATURE: 98.8 F

## 2019-03-25 DIAGNOSIS — R30.0 DYSURIA: ICD-10-CM

## 2019-03-25 DIAGNOSIS — Z78.0 MENOPAUSE: ICD-10-CM

## 2019-03-25 DIAGNOSIS — R14.3 FLATULENCE, ERUCTATION, AND GAS PAIN: ICD-10-CM

## 2019-03-25 DIAGNOSIS — Z01.818 PREOP GENERAL PHYSICAL EXAM: Primary | ICD-10-CM

## 2019-03-25 DIAGNOSIS — R14.1 FLATULENCE, ERUCTATION, AND GAS PAIN: ICD-10-CM

## 2019-03-25 DIAGNOSIS — B00.2 ORAL HERPES: ICD-10-CM

## 2019-03-25 DIAGNOSIS — R14.2 FLATULENCE, ERUCTATION, AND GAS PAIN: ICD-10-CM

## 2019-03-25 LAB
ALBUMIN UR-MCNC: NEGATIVE MG/DL
APPEARANCE UR: CLEAR
BASOPHILS # BLD AUTO: 0.1 10E9/L (ref 0–0.2)
BASOPHILS NFR BLD AUTO: 1 %
BILIRUB UR QL STRIP: NEGATIVE
COLOR UR AUTO: NORMAL
DIFFERENTIAL METHOD BLD: NORMAL
EOSINOPHIL # BLD AUTO: 0.1 10E9/L (ref 0–0.7)
EOSINOPHIL NFR BLD AUTO: 0.6 %
ERYTHROCYTE [DISTWIDTH] IN BLOOD BY AUTOMATED COUNT: 12.6 % (ref 10–15)
GLUCOSE UR STRIP-MCNC: NEGATIVE MG/DL
HCT VFR BLD AUTO: 38.9 % (ref 35–47)
HGB BLD-MCNC: 13 G/DL (ref 11.7–15.7)
HGB UR QL STRIP: NEGATIVE
IMM GRANULOCYTES # BLD: 0 10E9/L (ref 0–0.4)
IMM GRANULOCYTES NFR BLD: 0.4 %
KETONES UR STRIP-MCNC: NEGATIVE MG/DL
LEUKOCYTE ESTERASE UR QL STRIP: NEGATIVE
LYMPHOCYTES # BLD AUTO: 2.5 10E9/L (ref 0.8–5.3)
LYMPHOCYTES NFR BLD AUTO: 30.8 %
MCH RBC QN AUTO: 30.8 PG (ref 26.5–33)
MCHC RBC AUTO-ENTMCNC: 33.4 G/DL (ref 31.5–36.5)
MCV RBC AUTO: 92 FL (ref 78–100)
MONOCYTES # BLD AUTO: 0.8 10E9/L (ref 0–1.3)
MONOCYTES NFR BLD AUTO: 9.6 %
NEUTROPHILS # BLD AUTO: 4.7 10E9/L (ref 1.6–8.3)
NEUTROPHILS NFR BLD AUTO: 57.6 %
NITRATE UR QL: NEGATIVE
NRBC # BLD AUTO: 0 10*3/UL
NRBC BLD AUTO-RTO: 0 /100
PH UR STRIP: 6.5 PH (ref 4.7–8)
PLATELET # BLD AUTO: 396 10E9/L (ref 150–450)
RBC # BLD AUTO: 4.22 10E12/L (ref 3.8–5.2)
SOURCE: NORMAL
SP GR UR STRIP: 1.01 (ref 1–1.03)
UROBILINOGEN UR STRIP-MCNC: NORMAL MG/DL (ref 0–2)
WBC # BLD AUTO: 8.1 10E9/L (ref 4–11)

## 2019-03-25 PROCEDURE — 93000 ELECTROCARDIOGRAM COMPLETE: CPT | Performed by: INTERNAL MEDICINE

## 2019-03-25 PROCEDURE — 85025 COMPLETE CBC W/AUTO DIFF WBC: CPT | Performed by: PHYSICIAN ASSISTANT

## 2019-03-25 PROCEDURE — 99214 OFFICE O/P EST MOD 30 MIN: CPT | Mod: 25 | Performed by: PHYSICIAN ASSISTANT

## 2019-03-25 PROCEDURE — 81003 URINALYSIS AUTO W/O SCOPE: CPT | Performed by: PHYSICIAN ASSISTANT

## 2019-03-25 PROCEDURE — 36415 COLL VENOUS BLD VENIPUNCTURE: CPT | Performed by: PHYSICIAN ASSISTANT

## 2019-03-25 RX ORDER — ESTRADIOL 0.1 MG/D
1 FILM, EXTENDED RELEASE TRANSDERMAL WEEKLY
Qty: 12 PATCH | Refills: 4 | Status: SHIPPED | OUTPATIENT
Start: 2019-03-25 | End: 2020-08-06

## 2019-03-25 RX ORDER — VALACYCLOVIR HYDROCHLORIDE 1 G/1
1000 TABLET, FILM COATED ORAL 2 TIMES DAILY
Qty: 16 TABLET | Status: SHIPPED | OUTPATIENT
Start: 2019-03-25 | End: 2020-08-06

## 2019-03-25 ASSESSMENT — PATIENT HEALTH QUESTIONNAIRE - PHQ9
5. POOR APPETITE OR OVEREATING: NOT AT ALL
SUM OF ALL RESPONSES TO PHQ QUESTIONS 1-9: 0

## 2019-03-25 ASSESSMENT — MIFFLIN-ST. JEOR: SCORE: 1420.75

## 2019-03-25 ASSESSMENT — ANXIETY QUESTIONNAIRES
GAD7 TOTAL SCORE: 0
6. BECOMING EASILY ANNOYED OR IRRITABLE: NOT AT ALL
1. FEELING NERVOUS, ANXIOUS, OR ON EDGE: NOT AT ALL
2. NOT BEING ABLE TO STOP OR CONTROL WORRYING: NOT AT ALL
7. FEELING AFRAID AS IF SOMETHING AWFUL MIGHT HAPPEN: NOT AT ALL
5. BEING SO RESTLESS THAT IT IS HARD TO SIT STILL: NOT AT ALL
3. WORRYING TOO MUCH ABOUT DIFFERENT THINGS: NOT AT ALL

## 2019-03-25 ASSESSMENT — PAIN SCALES - GENERAL: PAINLEVEL: MODERATE PAIN (5)

## 2019-03-25 NOTE — NURSING NOTE
"Chief Complaint   Patient presents with     Pre-Op Exam       Initial /88 (BP Location: Left arm, Patient Position: Sitting, Cuff Size: Adult Regular)   Pulse 88   Temp 98.8  F (37.1  C)   Ht 1.664 m (5' 5.5\")   Wt 81.2 kg (179 lb)   SpO2 98%   BMI 29.33 kg/m   Estimated body mass index is 29.33 kg/m  as calculated from the following:    Height as of this encounter: 1.664 m (5' 5.5\").    Weight as of this encounter: 81.2 kg (179 lb).  Medication Reconciliation: complete    Apoorva Hickman LPN  "

## 2019-03-26 ASSESSMENT — ANXIETY QUESTIONNAIRES: GAD7 TOTAL SCORE: 0

## 2019-03-29 ENCOUNTER — ANESTHESIA EVENT (OUTPATIENT)
Dept: SURGERY | Facility: HOSPITAL | Age: 54
End: 2019-03-29
Payer: COMMERCIAL

## 2019-03-29 NOTE — ANESTHESIA PREPROCEDURE EVALUATION
Anesthesia Pre-Procedure Evaluation    Patient: Patience Mireles   MRN: [id number] : 1965          Preoperative Diagnosis: OSTEOCHONDROSIS OF HEAD OF 2ND METATARSAL BONE LT FOOT, LEFT FOOT PAIN, PLANTAR PLATE INJURY LEFT, HAMMERTOE LEFT FOOT, GASTROCNEIUS EQUINUS OF LEFT LOWER EXTREMITY    Procedure(s):  LEFT FOOT SECOND DIGIT WEIL OSTEOTOMY WITH POSSIBLE PLANTAR PLATE REPAIR    Past Medical History:   Diagnosis Date     Asymptomatic varicose veins 2003     Carcinoma in situ of cervix uteri 2000     Chest pain, unspecified 2004     Dysmenorrhea 2000     Endometriosis of uterus 2000     Endometriosis, site unspecified 10/26/2000     Follow-up examination, following unspecified surgery 2001     Leukorrhea, not specified as infective 2007     Need for prophylactic vaccination and inoculation against viral hepatitis 2009     Nonallopathic lesion of cervical region, not elsewhere classified 2001     Other screening mammogram 2000     PONV (postoperative nausea and vomiting)      Routine general medical examination at a [facility] care facility 2001     Past Surgical History:   Procedure Laterality Date     COLONOSCOPY N/A 2014    Procedure: COLONOSCOPY;  Surgeon: Quincy Fontanez MD;  Location: [facility]     ESOPHAGOSCOPY, GASTROSCOPY, DUODENOSCOPY (EGD), COMBINED  2013    Procedure: COMBINED ESOPHAGOSCOPY, GASTROSCOPY, DUODENOSCOPY (EGD);   ESOPHAGOGASTRODUODENOSCOPY WITH BIOPSIES;  Surgeon: Quincy Fontanez MD;  Location: [facility]     HYSTERECTOMY TOTAL ABDOMINAL, BILATERAL SALPINGO-OOPHORECTOMY, COMBINED N/A 2004       Anesthesia Evaluation     . Pt has had prior anesthetic.     History of anesthetic complications   - PONV        ROS/MED HX    ENT/Pulmonary:  - neg pulmonary ROS     Neurologic:  - neg neurologic ROS     Cardiovascular:  - neg cardiovascular ROS   (+) ----. : . . . :. . Previous cardiac testing date:results:date: results:ECG  "reviewed date:3/25/19 results:NSR date: results:          METS/Exercise Tolerance:     Hematologic:         Musculoskeletal:         GI/Hepatic:  - neg GI/hepatic ROS       Renal/Genitourinary:  - ROS Renal section negative       Endo:  - neg endo ROS       Psychiatric:  - neg psychiatric ROS       Infectious Disease:  - neg infectious disease ROS       Malignancy:   (+) Malignancy History of Other  Other CA cervical carcinoma status post         Other: Comment: s/p WERNER/BSO   - neg other ROS                      Physical Exam  Normal systems: cardiovascular, pulmonary and dental    Airway   Mallampati: II  TM distance: >3 FB  Neck ROM: full    Dental     Cardiovascular   Rhythm and rate: regular and normal      Pulmonary    breath sounds clear to auscultation            Lab Results   Component Value Date    WBC 8.1 03/25/2019    HGB 13.0 03/25/2019    HCT 38.9 03/25/2019     03/25/2019    CRP <2.9 08/24/2017    SED 9 08/24/2017     08/24/2017    POTASSIUM 3.9 08/24/2017    CHLORIDE 105 08/24/2017    CO2 25 08/24/2017    BUN 18 08/24/2017    CR 0.65 08/24/2017    GLC 86 08/24/2017    RENETTA 8.6 08/24/2017    ALBUMIN 3.9 08/24/2017    PROTTOTAL 7.5 08/24/2017    ALT 27 08/24/2017    AST 19 08/24/2017    ALKPHOS 63 08/24/2017    BILITOTAL 0.4 08/24/2017    LIPASE 141 08/24/2017    AMYLASE 51 08/24/2017    TSH 1.60 03/11/2016       Preop Vitals  BP Readings from Last 3 Encounters:   03/25/19 134/88   03/12/19 120/84   12/07/18 130/82    Pulse Readings from Last 3 Encounters:   03/25/19 88   03/12/19 72   12/07/18 69      Resp Readings from Last 3 Encounters:   03/11/16 18   12/19/13 19   08/09/13 22    SpO2 Readings from Last 3 Encounters:   03/25/19 98%   03/12/19 99%   12/07/18 99%      Temp Readings from Last 1 Encounters:   03/25/19 98.8  F (37.1  C)    Ht Readings from Last 1 Encounters:   03/25/19 1.664 m (5' 5.5\")      Wt Readings from Last 1 Encounters:   03/25/19 81.2 kg (179 lb)    Estimated body " "mass index is 29.33 kg/m  as calculated from the following:    Height as of 3/25/19: 1.664 m (5' 5.5\").    Weight as of 3/25/19: 81.2 kg (179 lb).       Anesthesia Plan      History & Physical Review  History and physical reviewed and following examination; no interval change.    ASA Status:  2 .    NPO Status:  > 8 hours    Plan for MAC with Intravenous and Propofol induction. Maintenance will be TIVA.  Reason for MAC:  Deep or markedly invasive procedure (G8)  PONV prophylaxis:  Ondansetron (or other 5HT-3), Dexamethasone or Solumedrol and Scopolamine patch  H&P 3/25      Postoperative Care  Postoperative pain management:  IV analgesics and Oral pain medications.      Consents  Anesthetic plan, risks, benefits and alternatives discussed with:  Patient..                 PHOEBE Loyola CRNA  "

## 2019-04-01 ENCOUNTER — ANESTHESIA (OUTPATIENT)
Dept: SURGERY | Facility: HOSPITAL | Age: 54
End: 2019-04-01
Payer: COMMERCIAL

## 2019-04-01 ENCOUNTER — APPOINTMENT (OUTPATIENT)
Dept: GENERAL RADIOLOGY | Facility: HOSPITAL | Age: 54
End: 2019-04-01
Attending: PODIATRIST
Payer: COMMERCIAL

## 2019-04-01 ENCOUNTER — HOSPITAL ENCOUNTER (OUTPATIENT)
Facility: HOSPITAL | Age: 54
Discharge: HOME | End: 2019-04-01
Attending: PODIATRIST | Admitting: PODIATRIST
Payer: COMMERCIAL

## 2019-04-01 VITALS
SYSTOLIC BLOOD PRESSURE: 117 MMHG | WEIGHT: 175 LBS | BODY MASS INDEX: 29.88 KG/M2 | OXYGEN SATURATION: 96 % | HEIGHT: 64 IN | RESPIRATION RATE: 16 BRPM | DIASTOLIC BLOOD PRESSURE: 77 MMHG | TEMPERATURE: 97.9 F

## 2019-04-01 DIAGNOSIS — S99.922D PLANTAR PLATE INJURY, LEFT, SUBSEQUENT ENCOUNTER: ICD-10-CM

## 2019-04-01 DIAGNOSIS — M92.72 OSTEOCHONDROSIS OF HEAD OF SECOND METATARSAL BONE OF LEFT FOOT: Primary | ICD-10-CM

## 2019-04-01 PROCEDURE — 25000128 H RX IP 250 OP 636: Performed by: PODIATRIST

## 2019-04-01 PROCEDURE — 25000125 ZZHC RX 250: Performed by: PODIATRIST

## 2019-04-01 PROCEDURE — 27110028 ZZH OR GENERAL SUPPLY NON-STERILE: Performed by: PODIATRIST

## 2019-04-01 PROCEDURE — 28313 REPAIR DEFORMITY OF TOE: CPT | Mod: LT | Performed by: PODIATRIST

## 2019-04-01 PROCEDURE — 36000060 ZZH SURGERY LEVEL 3 W FLUORO 1ST 30 MIN: Performed by: PODIATRIST

## 2019-04-01 PROCEDURE — 71000027 ZZH RECOVERY PHASE 2 EACH 15 MINS: Performed by: PODIATRIST

## 2019-04-01 PROCEDURE — 40000305 ZZH STATISTIC PRE PROC ASSESS I: Performed by: PODIATRIST

## 2019-04-01 PROCEDURE — 37000009 ZZH ANESTHESIA TECHNICAL FEE, EACH ADDTL 15 MIN: Performed by: PODIATRIST

## 2019-04-01 PROCEDURE — 27810325 ZZHC OR IMPLANT OTHER OPNP: Performed by: PODIATRIST

## 2019-04-01 PROCEDURE — 25000125 ZZHC RX 250: Performed by: NURSE ANESTHETIST, CERTIFIED REGISTERED

## 2019-04-01 PROCEDURE — 01999 UNLISTED ANES PROCEDURE: CPT | Performed by: NURSE ANESTHETIST, CERTIFIED REGISTERED

## 2019-04-01 PROCEDURE — C1769 GUIDE WIRE: HCPCS | Performed by: PODIATRIST

## 2019-04-01 PROCEDURE — 27210794 ZZH OR GENERAL SUPPLY STERILE: Performed by: PODIATRIST

## 2019-04-01 PROCEDURE — 25000125 ZZHC RX 250: Performed by: ANESTHESIOLOGY

## 2019-04-01 PROCEDURE — 25000128 H RX IP 250 OP 636: Performed by: NURSE ANESTHETIST, CERTIFIED REGISTERED

## 2019-04-01 PROCEDURE — 37000008 ZZH ANESTHESIA TECHNICAL FEE, 1ST 30 MIN: Performed by: PODIATRIST

## 2019-04-01 PROCEDURE — 40000277 XR SURGERY CARM FLUORO LESS THAN 5 MIN W STILLS: Mod: TC

## 2019-04-01 PROCEDURE — 36000058 ZZH SURGERY LEVEL 3 EA 15 ADDTL MIN: Performed by: PODIATRIST

## 2019-04-01 PROCEDURE — 28288 PARTIAL REMOVAL OF FOOT BONE: CPT | Performed by: ANESTHESIOLOGY

## 2019-04-01 PROCEDURE — 28308 INCISION OF METATARSAL: CPT | Mod: LT | Performed by: PODIATRIST

## 2019-04-01 PROCEDURE — 25800030 ZZH RX IP 258 OP 636: Performed by: NURSE ANESTHETIST, CERTIFIED REGISTERED

## 2019-04-01 DEVICE — IMPLANTABLE DEVICE: Type: IMPLANTABLE DEVICE | Site: FOOT | Status: FUNCTIONAL

## 2019-04-01 RX ORDER — MEPERIDINE HYDROCHLORIDE 50 MG/ML
12.5 INJECTION INTRAMUSCULAR; INTRAVENOUS; SUBCUTANEOUS
Status: DISCONTINUED | OUTPATIENT
Start: 2019-04-01 | End: 2019-04-01 | Stop reason: HOSPADM

## 2019-04-01 RX ORDER — SODIUM CHLORIDE, SODIUM LACTATE, POTASSIUM CHLORIDE, CALCIUM CHLORIDE 600; 310; 30; 20 MG/100ML; MG/100ML; MG/100ML; MG/100ML
INJECTION, SOLUTION INTRAVENOUS CONTINUOUS
Status: DISCONTINUED | OUTPATIENT
Start: 2019-04-01 | End: 2019-04-01 | Stop reason: HOSPADM

## 2019-04-01 RX ORDER — LIDOCAINE 40 MG/G
CREAM TOPICAL
Status: DISCONTINUED | OUTPATIENT
Start: 2019-04-01 | End: 2019-04-01 | Stop reason: HOSPADM

## 2019-04-01 RX ORDER — NALOXONE HYDROCHLORIDE 0.4 MG/ML
.1-.4 INJECTION, SOLUTION INTRAMUSCULAR; INTRAVENOUS; SUBCUTANEOUS
Status: DISCONTINUED | OUTPATIENT
Start: 2019-04-01 | End: 2019-04-01 | Stop reason: HOSPADM

## 2019-04-01 RX ORDER — CEFAZOLIN SODIUM 1 G/3ML
1 INJECTION, POWDER, FOR SOLUTION INTRAMUSCULAR; INTRAVENOUS SEE ADMIN INSTRUCTIONS
Status: DISCONTINUED | OUTPATIENT
Start: 2019-04-01 | End: 2019-04-01 | Stop reason: HOSPADM

## 2019-04-01 RX ORDER — SCOLOPAMINE TRANSDERMAL SYSTEM 1 MG/1
1 PATCH, EXTENDED RELEASE TRANSDERMAL ONCE
Status: COMPLETED | OUTPATIENT
Start: 2019-04-01 | End: 2019-04-01

## 2019-04-01 RX ORDER — ONDANSETRON 4 MG/1
4 TABLET, ORALLY DISINTEGRATING ORAL EVERY 30 MIN PRN
Status: DISCONTINUED | OUTPATIENT
Start: 2019-04-01 | End: 2019-04-01 | Stop reason: HOSPADM

## 2019-04-01 RX ORDER — FENTANYL CITRATE 50 UG/ML
INJECTION, SOLUTION INTRAMUSCULAR; INTRAVENOUS PRN
Status: DISCONTINUED | OUTPATIENT
Start: 2019-04-01 | End: 2019-04-01

## 2019-04-01 RX ORDER — OXYCODONE AND ACETAMINOPHEN 5; 325 MG/1; MG/1
1 TABLET ORAL EVERY 6 HOURS PRN
Qty: 40 TABLET | Refills: 0 | Status: SHIPPED | OUTPATIENT
Start: 2019-04-01 | End: 2019-05-08

## 2019-04-01 RX ORDER — ONDANSETRON 2 MG/ML
4 INJECTION INTRAMUSCULAR; INTRAVENOUS EVERY 30 MIN PRN
Status: DISCONTINUED | OUTPATIENT
Start: 2019-04-01 | End: 2019-04-01 | Stop reason: HOSPADM

## 2019-04-01 RX ORDER — LIDOCAINE HYDROCHLORIDE 20 MG/ML
INJECTION, SOLUTION INFILTRATION; PERINEURAL PRN
Status: DISCONTINUED | OUTPATIENT
Start: 2019-04-01 | End: 2019-04-01

## 2019-04-01 RX ORDER — PROPOFOL 10 MG/ML
INJECTION, EMULSION INTRAVENOUS PRN
Status: DISCONTINUED | OUTPATIENT
Start: 2019-04-01 | End: 2019-04-01

## 2019-04-01 RX ORDER — PROPOFOL 10 MG/ML
INJECTION, EMULSION INTRAVENOUS CONTINUOUS PRN
Status: DISCONTINUED | OUTPATIENT
Start: 2019-04-01 | End: 2019-04-01

## 2019-04-01 RX ORDER — CEFAZOLIN SODIUM 2 G/100ML
2 INJECTION, SOLUTION INTRAVENOUS
Status: COMPLETED | OUTPATIENT
Start: 2019-04-01 | End: 2019-04-01

## 2019-04-01 RX ADMIN — FENTANYL CITRATE 50 MCG: 50 INJECTION, SOLUTION INTRAMUSCULAR; INTRAVENOUS at 08:45

## 2019-04-01 RX ADMIN — MIDAZOLAM 2 MG: 1 INJECTION INTRAMUSCULAR; INTRAVENOUS at 08:20

## 2019-04-01 RX ADMIN — LIDOCAINE HYDROCHLORIDE 40 MG: 20 INJECTION, SOLUTION INFILTRATION; PERINEURAL at 08:22

## 2019-04-01 RX ADMIN — CEFAZOLIN SODIUM 2 G: 2 INJECTION, SOLUTION INTRAVENOUS at 08:20

## 2019-04-01 RX ADMIN — PROPOFOL 20 MG: 10 INJECTION, EMULSION INTRAVENOUS at 08:45

## 2019-04-01 RX ADMIN — PROPOFOL 20 MG: 10 INJECTION, EMULSION INTRAVENOUS at 08:23

## 2019-04-01 RX ADMIN — PROPOFOL 40 MG: 10 INJECTION, EMULSION INTRAVENOUS at 08:22

## 2019-04-01 RX ADMIN — SCOPOLAMINE 1 PATCH: 1 PATCH, EXTENDED RELEASE TRANSDERMAL at 08:17

## 2019-04-01 RX ADMIN — FENTANYL CITRATE 50 MCG: 50 INJECTION, SOLUTION INTRAMUSCULAR; INTRAVENOUS at 08:22

## 2019-04-01 RX ADMIN — PROPOFOL 60 MCG/KG/MIN: 10 INJECTION, EMULSION INTRAVENOUS at 08:22

## 2019-04-01 RX ADMIN — SODIUM CHLORIDE, POTASSIUM CHLORIDE, SODIUM LACTATE AND CALCIUM CHLORIDE: 600; 310; 30; 20 INJECTION, SOLUTION INTRAVENOUS at 07:45

## 2019-04-01 RX ADMIN — PROPOFOL 10 MG: 10 INJECTION, EMULSION INTRAVENOUS at 09:57

## 2019-04-01 RX ADMIN — PROPOFOL 20 MG: 10 INJECTION, EMULSION INTRAVENOUS at 09:55

## 2019-04-01 ASSESSMENT — MIFFLIN-ST. JEOR: SCORE: 1378.79

## 2019-04-01 NOTE — BRIEF OP NOTE
[facility]    Brief Operative Note    Pre-operative diagnosis: OSTEOCHONDROSIS OF HEAD OF 2ND METATARSAL BONE LT FOOT, LEFT FOOT PAIN, PLANTAR PLATE INJURY LEFT, HAMMERTOE LEFT FOOT, GASTROCNEIUS EQUINUS OF LEFT LOWER EXTREMITY  Post-operative diagnosis Osteochondrosis of head of second metatarsal bone and plantar plate tear of left foot  Procedure: Procedure(s):  LEFT FOOT SECOND DIGIT WEIL OSTEOTOMY WITH POSSIBLE PLANTAR PLATE REPAIR  Surgeon: Surgeon(s) and Role:     * Greta Pacheco DPM - Primary  Anesthesia: Monitor Anesthesia Care   Estimated blood loss: Minimal  Drains: None  Specimens: * No specimens in log *  Findings:   Same as pre-op diagnosis.  Complications: None.  Implants: None. Used 2-0 Vicryl, 3-0 Vicryl, 3-0 Prolene

## 2019-04-01 NOTE — ANESTHESIA CARE TRANSFER NOTE
Patient: Patience Mireles    Procedure(s):  LEFT FOOT SECOND DIGIT WEIL OSTEOTOMY WITH POSSIBLE PLANTAR PLATE REPAIR    Diagnosis: OSTEOCHONDROSIS OF HEAD OF 2ND METATARSAL BONE LT FOOT, LEFT FOOT PAIN, PLANTAR PLATE INJURY LEFT, HAMMERTOE LEFT FOOT, GASTROCNEIUS EQUINUS OF LEFT LOWER EXTREMITY  Diagnosis Additional Information: No value filed.    Anesthesia Type:   MAC     Note:  Airway :Nasal Cannula  Patient transferred to:Phase II  Handoff Report: Identifed the Patient, Identified the Reponsible Provider, Reviewed the pertinent medical history, Discussed the surgical course, Reviewed Intra-OP anesthesia mangement and issues during anesthesia, Set expectations for post-procedure period and Allowed opportunity for questions and acknowledgement of understanding      Vitals: (Last set prior to Anesthesia Care Transfer)    CRNA VITALS  4/1/2019 0941 - 4/1/2019 1011      4/1/2019             Resp Rate (set):  8                Electronically Signed By: PHOEBE Ch CRNA  April 1, 2019  10:11 AM

## 2019-04-01 NOTE — OR NURSING
Pateint discharged to home.  Graciela score home. Pain level 0/10.  Discharged from unit via WC. Home with SO

## 2019-04-01 NOTE — ANESTHESIA POSTPROCEDURE EVALUATION
Patient: Patience Mrieles    Procedure(s):  LEFT FOOT SECOND DIGIT WEIL OSTEOTOMY WITH POSSIBLE PLANTAR PLATE REPAIR    Diagnosis:OSTEOCHONDROSIS OF HEAD OF 2ND METATARSAL BONE LT FOOT, LEFT FOOT PAIN, PLANTAR PLATE INJURY LEFT, HAMMERTOE LEFT FOOT, GASTROCNEIUS EQUINUS OF LEFT LOWER EXTREMITY  Diagnosis Additional Information: No value filed.    Anesthesia Type:  MAC    Note:  Anesthesia Post Evaluation    Patient location during evaluation: Phase 2 and Bedside  Patient participation: Able to fully participate in evaluation  Level of consciousness: awake and alert  Pain management: adequate  Airway patency: patent  Cardiovascular status: acceptable  Respiratory status: acceptable  Hydration status: stable  PONV: none     Anesthetic complications: None          Last vitals:  Vitals:    04/01/19 1050 04/01/19 1055 04/01/19 1100   BP: 121/77 125/81 117/77   Resp: 16 16 16   Temp:      SpO2: 97% 94% 96%         Electronically Signed By: Bertrand Camilo MD  April 1, 2019  11:50 AM

## 2019-04-01 NOTE — OR NURSING
Pt sat on the edge of the bed without dizziness. Dr shen has talked with pt and SO.   Pt taking fluids well. Instructed on care and follow up and meds and scop patch.

## 2019-04-02 NOTE — OP NOTE
[facility] Brief Operative Note    Pre-operative diagnosis: OSTEOCHONDROSIS OF HEAD OF 2ND METATARSAL BONE LT FOOT, LEFT FOOT PAIN, PLANTAR PLATE INJURY LEFT, HAMMERTOE LEFT FOOT   Post-operative diagnosis Osteochondrosis of head of second metatarsal bone and plantar plate tear of left foot   Procedure: Procedure(s):  LEFT FOOT SECOND DIGIT WEIL OSTEOTOMY WITH POSSIBLE PLANTAR PLATE REPAIR   Surgeon: Greta Pacheco DPM       Estimated blood loss: Minimal    Specimens: None   Findings: Same as pre-op diagnosis  Implant: 0 fiber wire, 3-0 Vicryl, 3-0 Prolene         PROCEDURE IN DETAIL:  Under mild sedation, the patient was brought into the operating room and placed on the operating room in the supine position.  A pneumatic ankle tourniquet was placed about the patient's left ankle.  Following IV sedation, local anesthesia was obtained about the patient's procedure site in a V-block utilizing 20mL of 0.5% Marcaine plain.  The foot was then scrubbed, prepped, and draped in the usual aseptic manner.  An official time-out was performed to identify the correct patient, limb, and laterality. All members of the operating room team were in agreement with the time-out. An Esmarch bandage was utilized to exsanguinate the patient's right foot, and the pneumatic ankle tourniquet was inflated to 250mmHg.     Attention was then directed to the dorsum of the second metatarsophalangeal joint, where a 5 cm linear longitudinal incision was made centrally over the second metatarsophalangeal joint.  The incision was deepened through subcutaneous tissues using sharp and blunt dissection.  Care was taken to identify all vital neural and vascular structures.  All bleeders were ligated and cauterized as necessary.     At this time, a linear capsulotomy was performed over the dorsal aspect of the second metatarsophalangeal joint.  The periosteal and capsular structures were carefully dissected free of their osseous attachments and  reflected medially and laterally, thus exposing the head of the second metatarsal at the operative site. At this time, the metatarsal head the base of the proximal phalanx were well visualized. The cartilage at the central 2/3 of the 2nd metatarsal head and the cartilage at the central 2/3 of the base of the proximal phalanx was completely worn away. The McGlamry elevator was utilized to break up plantar adhesions and a 0.045-inch k-wire was used for subchondral drilling to the 2nd metatarsal head.     Utilizing an oscillating bone saw, a through-and-through bone cut was made starting approximately 2 mm plantar from the most dorsal aspect of the cartilaginous head of the second metatarsal.  The osteotomy was made parallel to the weight-bearing surface and oriented from distal-dorsal to proximal-plantar.  The metatarsal head was then able to flow to a more corrected position. A 0.045-inch k-wire was placed across the osteotomy site as temporary fixation. Another 0.045-inch K-wire was placed dorsal to plantar in the middle of the base of the proximal phalanx. A joint distractor was placed over the k-wires to distract the metatarsophalangeal joint. The plantar plate was well visualized and a button hole tear was identified in the mid substance of the plantar plate immediately proximal to the base of the proximal phalanx. The medial and lateral collateral ligaments were released off the phalanx and the plantar plate was fully released from the base of the proximal phalanx.     A pre-loaded Arthrex mini Scorpion was then pre-loaded with 0-fiber wire and passed into the joint and a deep bite of the lateral plantar plate was taken. The mini scorpion was reloaded with the 0- fiber wire and place medially in the plantar plate. The mini scorpion was rotated while firing with care not to hit the metatarsal with the needle. The k-wires were removed and all soft tissue was removed from the base of the proximal phalanx to allow  advancement of the plantar plate.    Two holes were then drilled through the proximal phalanx with a 0.062-inch k-wire from dorsal lateral to plantar medial and dorsal medial to plantar lateral. The PEEK funnel and suture passer were passed through the holes, and the fiber wires were pulled through the appropriate sides of the proximal phalanx.     The metatarsal head was then re-aligned under fluoroscopy achieving approximately 4mm of correction, and a 0.045-inch k-wires was used to temporarily fixate the metatarsal head. Lateral and distal to the k-wire, a 2.0mm x 13.0mm Quick Fix screw was applied. The k-wire was removed and a 2.0 x 11 mm Quick Fix screw was placed proximally and laterally from the first site of fixation. The metatarsal head was noted to be stable and the dorsal overhanging edge was debrided with a rongeur.     The second digit was plantarflexed and the two fiber wires on either side of the proximal phalanx were tied under tension with a surgeon's knot with five throws.     The wound was flushed with copious amounts of sterile normal saline. The extensor tendon was reapproximated and coapted at both sites utilizing 2-0 Vicryl, and the capsule of the second metatarsophalangeal joint was reapproximated and coapted utilizing 2-0 Vicryl. The subcutaneous tissues were reapproximated and coapted utilizing 3-0 Vicryl.  The skin was reapproximated and coapted utilizing 4-0 Prolene in a horizontal mattress suture technique.      The wound was covered with Xeroform, 4 x 4 gauze, Kerlix, and Coban. The toe was plantarflexed with the dressing. The pneumatic ankle tourniquet was deflated and a prompt hyperemic response was noted to all digits of the right foot.      The patient tolerated the procedure and anesthesia well.  He was transferred to the recovery room with vital signs stable and vascular status intact to all digits of the right foot.  Following a period of postoperative monitoring, the patient will  be discharged home with oral and written postoperative instructions. The patient has Percocet for post-operative pain management. The patient has a CAM boot on the leg and has been instructed to keep the CAM boot on at all times using crutches to keep the foot 100% non-weightbearing. The patient is scheduled for a first post-operative follow-up podiatry appointment on Monday, 04/08/2019.

## 2019-04-09 ENCOUNTER — HOSPITAL ENCOUNTER (OUTPATIENT)
Dept: GENERAL RADIOLOGY | Facility: HOSPITAL | Age: 54
Discharge: HOME | End: 2019-04-09
Attending: PODIATRIST | Admitting: PODIATRIST
Payer: COMMERCIAL

## 2019-04-09 ENCOUNTER — TELEPHONE (OUTPATIENT)
Dept: PODIATRY | Facility: OTHER | Age: 54
End: 2019-04-09

## 2019-04-09 ENCOUNTER — OFFICE VISIT (OUTPATIENT)
Dept: PODIATRY | Facility: OTHER | Age: 54
End: 2019-04-09
Attending: PODIATRIST
Payer: COMMERCIAL

## 2019-04-09 VITALS
HEART RATE: 94 BPM | SYSTOLIC BLOOD PRESSURE: 120 MMHG | TEMPERATURE: 97.8 F | OXYGEN SATURATION: 98 % | DIASTOLIC BLOOD PRESSURE: 84 MMHG

## 2019-04-09 DIAGNOSIS — M20.42 HAMMER TOE OF LEFT FOOT: ICD-10-CM

## 2019-04-09 DIAGNOSIS — G89.18 POST-OP PAIN: ICD-10-CM

## 2019-04-09 DIAGNOSIS — M92.72 OSTEOCHONDROSIS OF HEAD OF SECOND METATARSAL BONE OF LEFT FOOT: ICD-10-CM

## 2019-04-09 DIAGNOSIS — S99.922D PLANTAR PLATE INJURY, LEFT, SUBSEQUENT ENCOUNTER: ICD-10-CM

## 2019-04-09 DIAGNOSIS — M92.72 OSTEOCHONDROSIS OF HEAD OF SECOND METATARSAL BONE OF LEFT FOOT: Primary | ICD-10-CM

## 2019-04-09 PROCEDURE — 99024 POSTOP FOLLOW-UP VISIT: CPT | Performed by: PODIATRIST

## 2019-04-09 PROCEDURE — 73630 X-RAY EXAM OF FOOT: CPT | Mod: TC,LT

## 2019-04-09 ASSESSMENT — PAIN SCALES - GENERAL: PAINLEVEL: MILD PAIN (2)

## 2019-04-09 NOTE — TELEPHONE ENCOUNTER
Called patient , left vm to return call patient returned call and x-ray results candice reviewed with patient understanding.    ----- Message from Greta Pacheco DPM sent at 4/9/2019 12:35 PM CDT -----  Please call patient and inform her that her post-op x-rays look stable with no change in alignment from the surgery. Thank you.

## 2019-04-09 NOTE — PROGRESS NOTES
Chief complaint: Patient presents with:  Surgical Followup      History of Present Illness: This 53 year old female is seen post-up evaluation of a LEFT foot 2nd metatarsal Weil osteotomy and plantar plate repair (DOS 04/01/2019). She had increased pain the first couple of days after surgery, but her pain is now more controlled. She is having increased electrical sensations on the bottom of her foot. She has loosened her CAM boot and loosened the coban which helped a little bit. The boot seems to be irritating her foot and puts pressure on the foot. She is wearing a CAM boot with an offloading knee crutch she purchased. No further pedal complaints at this time.     History of pain: Pain has been present for over five years. Dr. Lee Smith gave her an injection a couple times, but she sought a second opinion by Dr. Woody through [facility]. He also provided a couple injections which temporarily helped, but the pain came back. She was scheduled for surgery with Dr. Woody, but she thought she may want a second opinion before jumping into surgery and she cancelled the surgery. Dr. Woody then moved to [place]. She presented to this clinic for an opinion on what she should have done.       /84   Pulse 94   Temp 97.8  F (36.6  C)   SpO2 98%     Patient Active Problem List   Diagnosis     Menopause     ACP (advance care planning)     Status post WERNER-BSO       Past Surgical History:   Procedure Laterality Date     BUNIONECTOMY Left 4/1/2019    Procedure: LEFT FOOT SECOND DIGIT WEIL OSTEOTOMY WITH POSSIBLE PLANTAR PLATE REPAIR;  Surgeon: Greta Pacheco DPM;  Location: [facility] OR     COLONOSCOPY N/A 11/6/2014    Procedure: COLONOSCOPY;  Surgeon: Quincy Fontanez MD;  Location: [facility] OR     ESOPHAGOSCOPY, GASTROSCOPY, DUODENOSCOPY (EGD), COMBINED  8/9/2013    Procedure: COMBINED ESOPHAGOSCOPY, GASTROSCOPY, DUODENOSCOPY (EGD);   ESOPHAGOGASTRODUODENOSCOPY WITH BIOPSIES;  Surgeon: Quincy Fontanez MD;   Location: [facility] OR     HYSTERECTOMY TOTAL ABDOMINAL, BILATERAL SALPINGO-OOPHORECTOMY, COMBINED N/A 01/01/2004       Current Outpatient Medications   Medication     estradiol (VIVELLE-DOT) 0.1 MG/24HR bi-weekly patch     linaclotide (LINZESS) 145 MCG capsule     loratadine (CLARITIN) 10 MG tablet     valACYclovir (VALTREX) 1000 mg tablet     order for DME     No current facility-administered medications for this visit.         No Known Allergies    Family History   Problem Relation Age of Onset     Heart Disease Mother 68        MI; cause of death     C.A.D. Father      Diabetes Father      Hypertension Father      Osteoarthritis Father        Social History     Socioeconomic History     Marital status: Single     Spouse name: Not on file     Number of children: Not on file     Years of education: Not on file     Highest education level: Not on file   Occupational History     Not on file   Social Needs     Financial resource strain: Not on file     Food insecurity:     Worry: Not on file     Inability: Not on file     Transportation needs:     Medical: Not on file     Non-medical: Not on file   Tobacco Use     Smoking status: Former Smoker     Types: Cigarettes     Smokeless tobacco: Never Used   Substance and Sexual Activity     Alcohol use: Yes     Alcohol/week: 0.0 oz     Drug use: Not on file     Sexual activity: Not on file   Lifestyle     Physical activity:     Days per week: Not on file     Minutes per session: Not on file     Stress: Not on file   Relationships     Social connections:     Talks on phone: Not on file     Gets together: Not on file     Attends Synagogue service: Not on file     Active member of club or organization: Not on file     Attends meetings of clubs or organizations: Not on file     Relationship status: Not on file     Intimate partner violence:     Fear of current or ex partner: Not on file     Emotionally abused: Not on file     Physically abused: Not on file     Forced sexual activity:  Not on file   Other Topics Concern      Service Not Asked     Blood Transfusions Not Asked     Caffeine Concern Yes     Occupational Exposure Not Asked     Hobby Hazards Not Asked     Sleep Concern Not Asked     Stress Concern Not Asked     Weight Concern Not Asked     Special Diet Not Asked     Back Care Not Asked     Exercise Not Asked     Bike Helmet Not Asked     Seat Belt Not Asked     Self-Exams Not Asked     Parent/sibling w/ CABG, MI or angioplasty before 65F 55M? No   Social History Narrative     Not on file       ROS: 10 point ROS neg other than the symptoms noted above in the HPI.  EXAM  Constitutional: healthy, alert and no distress     Psychiatric: mentation appears normal and affect normal/bright    LEFT FOOT FOCUSED     VASCULAR:  -Dorsalis pedis pulse +2/4 b/l  -Posterior tibial pulse +2/4 b/l  -Capillary refill time < 3 seconds to b/l hallux  -Hair growth Present to b/l anterior legs and ankles     NEURO:  -Light touch sensation intact to b/l plantar forefoot     DERM:  -Skin along incision sites on dorsal LEFT 2nd MTPJ is well approximated with no dehiscence.   ---Sutures intact.  ---Mild erythema (blancheable)  ---Mild calor melchor-incision -- par with post operative course  ---Moderate melchor-incision edema with no ecchymosis --par with post-operative course.   ---No dark or ascending erythema, no moderate calor, no malodor, no purulence, no other SOI.    -Toenails normotrophic x 5     MSK:  -Pain along incision site over LEFT 2nd MTPJ    MRI LEFT FOOT 10/31/2018  IMPRESSION:   1. 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  2. Mild to moderate degenerative change in the first metatarsal  phalangeal joint.     YARELY OLIVIER MD    RADIOGRAPHS LEFT FOOT 04/09/2019  IMPRESSION: Postoperative changes are distal second metatarsal       BENSON COSTA  "MD  ============================================================     ASSESSMENT:  (M92.72) Osteochondrosis of head of second metatarsal bone of left foot  (primary encounter diagnosis)    (S99.922D) Plantar plate injury, left, subsequent encounter    (M20.42) Hammer toe of left foot    (G89.18) Post-op pain         PLAN:  -Patient evaluated and examined. Treatment options discussed with no educational barriers noted.  -Reviewed post-op x-rays   -Ordered post-op x-rays for today  -Discussed surgical course and future post-op course with patient.  -Patient's incision site looks good with proper alignment of her second toe.   ---Continue to look for SOI (redness, swelling, pain, purulence, fever, chills, nausea, vomiting) and return to podiatry or the ED if she has any SOI. She is in agreement with this plan.  -Dressed incision site with Xeroform, gauze, ABD, Kerlix and 4\" ACE wrap (patient requested not to have the double long ACE wrap because it makes her foot too hot).  ---Patient to leave this dressing intact until her follow-up appointment unless she has concerns with infection  -Remain NWB with the knee crutch  -Patient is no longer taking pain medication and she is not requiring refills     -Will consider a carbon fiber plate for shoe gear when patient is transitioning into a regular shoe  -Patient in agreement with the above treatment plan and all of patient's questions were answered.        RTC Post-op Nadeem, 04/16/2019 for suture removal        Greta Pacheco DPM  "

## 2019-04-16 ENCOUNTER — OFFICE VISIT (OUTPATIENT)
Dept: PODIATRY | Facility: OTHER | Age: 54
End: 2019-04-16
Attending: PODIATRIST
Payer: COMMERCIAL

## 2019-04-16 VITALS
DIASTOLIC BLOOD PRESSURE: 78 MMHG | HEART RATE: 98 BPM | OXYGEN SATURATION: 98 % | SYSTOLIC BLOOD PRESSURE: 116 MMHG | TEMPERATURE: 98.6 F

## 2019-04-16 DIAGNOSIS — S99.922D PLANTAR PLATE INJURY, LEFT, SUBSEQUENT ENCOUNTER: ICD-10-CM

## 2019-04-16 DIAGNOSIS — M20.42 HAMMER TOE OF LEFT FOOT: ICD-10-CM

## 2019-04-16 DIAGNOSIS — M92.72 OSTEOCHONDROSIS OF HEAD OF SECOND METATARSAL BONE OF LEFT FOOT: Primary | ICD-10-CM

## 2019-04-16 PROCEDURE — 99024 POSTOP FOLLOW-UP VISIT: CPT | Performed by: PODIATRIST

## 2019-04-16 ASSESSMENT — PAIN SCALES - GENERAL: PAINLEVEL: NO PAIN (0)

## 2019-04-16 NOTE — PROGRESS NOTES
Chief complaint: Patient presents with:  Surgical Followup: post op left foot      History of Present Illness: This 53 year old female is seen post-up evaluation of a LEFT foot 2nd metatarsal Weil osteotomy and plantar plate repair (DOS 04/01/2019). She has minimal to no pain along her incision site, but she has moderate numbness in her second toe so she is wondering if this is normal. She has been wearing her CAM boot consistently and offloading her foot completely with a knee crutch. No further pedal complaints today.      History of pain: Pain has been present for over five years. Dr. Lee Smith gave her an injection a couple times, but she sought a second opinion by Dr. Woody through [facility]. He also provided a couple injections which temporarily helped, but the pain came back. She was scheduled for surgery with Dr. Woody, but she thought she may want a second opinion before jumping into surgery and she cancelled the surgery. Dr. Woody then moved to [place]. She presented to this clinic for an opinion on what she should have done.       /78 (BP Location: Right arm, Patient Position: Sitting, Cuff Size: Adult Regular)   Pulse 98   Temp 98.6  F (37  C) (Tympanic)   SpO2 98%     Patient Active Problem List   Diagnosis     Menopause     ACP (advance care planning)     Status post WERNER-BSO       Past Surgical History:   Procedure Laterality Date     BUNIONECTOMY Left 4/1/2019    Procedure: LEFT FOOT SECOND DIGIT WEIL OSTEOTOMY WITH POSSIBLE PLANTAR PLATE REPAIR;  Surgeon: Greta Pacheco DPM;  [facility]: [facility]     COLONOSCOPY N/A 11/6/2014    Procedure: COLONOSCOPY;  Surgeon: Quincy Fontanez MD;  [facility]: [facility]     ESOPHAGOSCOPY, GASTROSCOPY, DUODENOSCOPY (EGD), COMBINED  8/9/2013    Procedure: COMBINED ESOPHAGOSCOPY, GASTROSCOPY, DUODENOSCOPY (EGD);   ESOPHAGOGASTRODUODENOSCOPY WITH BIOPSIES;  Surgeon: Quincy Fontanez MD;  Location: [facility]     HYSTERECTOMY TOTAL ABDOMINAL,  BILATERAL SALPINGO-OOPHORECTOMY, COMBINED N/A 01/01/2004       Current Outpatient Medications   Medication     estradiol (VIVELLE-DOT) 0.1 MG/24HR bi-weekly patch     linaclotide (LINZESS) 145 MCG capsule     loratadine (CLARITIN) 10 MG tablet     order for DME     valACYclovir (VALTREX) 1000 mg tablet     No current facility-administered medications for this visit.         No Known Allergies    Family History   Problem Relation Age of Onset     Heart Disease Mother 68        MI; cause of death     C.A.D. Father      Diabetes Father      Hypertension Father      Osteoarthritis Father        Social History     Socioeconomic History     Marital status: Single     Spouse name: Not on file     Number of children: Not on file     Years of education: Not on file     Highest education level: Not on file   Occupational History     Not on file   Social Needs     Financial resource strain: Not on file     Food insecurity:     Worry: Not on file     Inability: Not on file     Transportation needs:     Medical: Not on file     Non-medical: Not on file   Tobacco Use     Smoking status: Former Smoker     Types: Cigarettes     Smokeless tobacco: Never Used   Substance and Sexual Activity     Alcohol use: Yes     Alcohol/week: 0.0 oz     Drug use: Not on file     Sexual activity: Not on file   Lifestyle     Physical activity:     Days per week: Not on file     Minutes per session: Not on file     Stress: Not on file   Relationships     Social connections:     Talks on phone: Not on file     Gets together: Not on file     Attends Druze service: Not on file     Active member of club or organization: Not on file     Attends meetings of clubs or organizations: Not on file     Relationship status: Not on file     Intimate partner violence:     Fear of current or ex partner: Not on file     Emotionally abused: Not on file     Physically abused: Not on file     Forced sexual activity: Not on file   Other Topics Concern       Service Not Asked     Blood Transfusions Not Asked     Caffeine Concern Yes     Occupational Exposure Not Asked     Hobby Hazards Not Asked     Sleep Concern Not Asked     Stress Concern Not Asked     Weight Concern Not Asked     Special Diet Not Asked     Back Care Not Asked     Exercise Not Asked     Bike Helmet Not Asked     Seat Belt Not Asked     Self-Exams Not Asked     Parent/sibling w/ CABG, MI or angioplasty before 65F 55M? No   Social History Narrative     Not on file       ROS: 10 point ROS neg other than the symptoms noted above in the HPI.  EXAM  Constitutional: healthy, alert and no distress     Psychiatric: mentation appears normal and affect normal/bright     LEFT FOOT FOCUSED     VASCULAR:  -Dorsalis pedis pulse +2/4 b/l  -Posterior tibial pulse +2/4 b/l  -Capillary refill time < 3 seconds to b/l hallux  -Hair growth Present to b/l anterior legs and ankles     NEURO:  -Light touch sensation intact to b/l plantar forefoot     DERM:  -Skin along incision sites on dorsal LEFT 2nd MTPJ is well approximated with no dehiscence post removal of sutures  ---Sutures along the dorsal LEFT 2nd MTPJ are tight with moderate melchor incision edema and strain along the skin with minimal clear, serous fluid within the skin line only -- two sutures left intact  ---Mild erythema (blancheable)  ---Minimal calor melchor-incision -- par with post operative course  ---Mild-tomoderate melchor-incision and forefoot edema with no ecchymosis --par with post-operative course    ---No dark or ascending erythema, no moderate calor, no malodor, no purulence, no other SOI.    -Toenails normotrophic x 5 and painted with red nail polish     MSK:  -No pain along incision site over LEFT 2nd MTPJ  ---Moderate numbness in LEFT 2nd digit and melchor-wound site    MRI LEFT FOOT 10/31/2018  IMPRESSION:   1. 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  2. Mild to moderate degenerative  change in the first metatarsal  phalangeal joint.     YARELY OLIVIER MD     RADIOGRAPHS LEFT FOOT 04/09/2019  IMPRESSION: Postoperative changes are distal second metatarsal       BENSON MD JULISSA  ============================================================     ASSESSMENT:  (M92.72) Osteochondrosis of head of second metatarsal bone of left foot  (primary encounter diagnosis)     (S99.922D) Plantar plate injury, left, subsequent encounter     (M20.42) Hammer toe of left foot         PLAN:  -Patient evaluated and examined. Treatment options discussed with no educational barriers noted.  -Reviewed post-op x-rays   -Ordered post-op x-rays for today  -Sutures removed with the exception of two sutures over a site of tension. This will be removed in one week.  ---Dry dressing with gauze and tape applied over incision  ---Mild compression applied to foot with an ACE wrap  ---Patient encouraged to be less active and elevate her foot more to keep the edema out of her foot    -Patient still has proper alignment of her second toe.   ---Continue to look for SOI (redness, swelling, pain, purulence, fever, chills, nausea, vomiting) and return to podiatry or the ED if she has any SOI. She is in agreement with this plan.  -Continue limited WB with the CAM boot using crutches or a knee scooter  -Patient continues to have minimal pain from her incision / surgical site  -Will consider transitioning patient to walking in a CAM boot 4 weeks post surgery, then transitioning to a regular shoe at six weeks.  -Patient in agreement with the above treatment plan and all of patient's questions were answered.        RTC one week for removal of the additional two sutures  -Will discuss with patient a carbon fiber plate for shoe gear when patient is transitioning into a regular shoe      Greta Pacheco DPM

## 2019-04-16 NOTE — NURSING NOTE
"Chief Complaint   Patient presents with     Surgical Followup     post op left foot       Initial /78 (BP Location: Right arm, Patient Position: Sitting, Cuff Size: Adult Regular)   Pulse 98   Temp 98.6  F (37  C) (Tympanic)   SpO2 98%  Estimated body mass index is 30.04 kg/m  as calculated from the following:    Height as of 4/1/19: 1.626 m (5' 4\").    Weight as of 4/1/19: 79.4 kg (175 lb).  Medication Reconciliation: complete    Ila Jimenez LPN  "

## 2019-04-25 ENCOUNTER — OFFICE VISIT (OUTPATIENT)
Dept: PODIATRY | Facility: OTHER | Age: 54
End: 2019-04-25
Attending: PODIATRIST
Payer: COMMERCIAL

## 2019-04-25 VITALS
SYSTOLIC BLOOD PRESSURE: 131 MMHG | HEART RATE: 93 BPM | RESPIRATION RATE: 16 BRPM | TEMPERATURE: 98.5 F | DIASTOLIC BLOOD PRESSURE: 86 MMHG

## 2019-04-25 DIAGNOSIS — M20.42 HAMMER TOE OF LEFT FOOT: ICD-10-CM

## 2019-04-25 DIAGNOSIS — S99.922D PLANTAR PLATE INJURY, LEFT, SUBSEQUENT ENCOUNTER: ICD-10-CM

## 2019-04-25 DIAGNOSIS — M92.72 OSTEOCHONDROSIS OF HEAD OF SECOND METATARSAL BONE OF LEFT FOOT: Primary | ICD-10-CM

## 2019-04-25 PROCEDURE — 99024 POSTOP FOLLOW-UP VISIT: CPT | Performed by: PODIATRIST

## 2019-04-25 ASSESSMENT — PAIN SCALES - GENERAL: PAINLEVEL: NO PAIN (0)

## 2019-04-25 NOTE — NURSING NOTE
"Chief Complaint   Patient presents with     Surgical Followup       Initial /86 (BP Location: Left arm, Patient Position: Sitting, Cuff Size: Adult Regular)   Pulse 93   Temp 98.5  F (36.9  C) (Tympanic)   Resp 16  Estimated body mass index is 30.04 kg/m  as calculated from the following:    Height as of 4/1/19: 1.626 m (5' 4\").    Weight as of 4/1/19: 79.4 kg (175 lb).  Medication Reconciliation: complete    Belgica Resendiz LPN  "

## 2019-04-25 NOTE — PROGRESS NOTES
Chief complaint: Patient presents with:  Surgical Followup        History of Present Illness: This 53 year old female is seen post-up evaluation of a LEFT foot 2nd metatarsal Weil osteotomy and plantar plate repair (DOS 04/01/2019). She is here for a suture removal of two more suturs. She has minimal to no pain along her incision site. She has been wearing her CAM boot consistently and offloading her foot completely with a knee crutch. She is hoping to start WB soon. No further pedal complaints today.      History of pain: Pain has been present for over five years. Dr. Lee Smith gave her an injection a couple times, but she sought a second opinion by Dr. Woody through [facility]. He also provided a couple injections which temporarily helped, but the pain came back. She was scheduled for surgery with Dr. Woody, but she thought she may want a second opinion before jumping into surgery and she cancelled the surgery. Dr. Woody then moved to [place]. She presented to this clinic for an opinion on what she should have done.       /86 (BP Location: Left arm, Patient Position: Sitting, Cuff Size: Adult Regular)   Pulse 93   Temp 98.5  F (36.9  C) (Tympanic)   Resp 16     Patient Active Problem List   Diagnosis     Menopause     ACP (advance care planning)     Status post WERNER-BSO       Past Surgical History:   Procedure Laterality Date     BUNIONECTOMY Left 4/1/2019    Procedure: LEFT FOOT SECOND DIGIT WEIL OSTEOTOMY WITH POSSIBLE PLANTAR PLATE REPAIR;  Surgeon: Greta Pacheco DPM;  [facility]: [facility]     COLONOSCOPY N/A 11/6/2014    Procedure: COLONOSCOPY;  Surgeon: Quincy Fontanez MD;  [facility]     ESOPHAGOSCOPY, GASTROSCOPY, DUODENOSCOPY (EGD), COMBINED  8/9/2013    Procedure: COMBINED ESOPHAGOSCOPY, GASTROSCOPY, DUODENOSCOPY (EGD);   ESOPHAGOGASTRODUODENOSCOPY WITH BIOPSIES;  Surgeon: Quincy Fontanez MD;  [facility]: [facility]     HYSTERECTOMY TOTAL ABDOMINAL, BILATERAL  SALPINGO-OOPHORECTOMY, COMBINED N/A 01/01/2004       Current Outpatient Medications   Medication     estradiol (VIVELLE-DOT) 0.1 MG/24HR bi-weekly patch     linaclotide (LINZESS) 145 MCG capsule     loratadine (CLARITIN) 10 MG tablet     order for DME     valACYclovir (VALTREX) 1000 mg tablet     No current facility-administered medications for this visit.         No Known Allergies    Family History   Problem Relation Age of Onset     Heart Disease Mother 68        MI; cause of death     C.A.D. Father      Diabetes Father      Hypertension Father      Osteoarthritis Father        Social History     Socioeconomic History     Marital status: Single     Spouse name: None     Number of children: None     Years of education: None     Highest education level: None   Occupational History     None   Social Needs     Financial resource strain: None     Food insecurity:     Worry: None     Inability: None     Transportation needs:     Medical: None     Non-medical: None   Tobacco Use     Smoking status: Former Smoker     Types: Cigarettes     Smokeless tobacco: Never Used   Substance and Sexual Activity     Alcohol use: Yes     Alcohol/week: 0.0 oz     Drug use: None     Sexual activity: None   Lifestyle     Physical activity:     Days per week: None     Minutes per session: None     Stress: None   Relationships     Social connections:     Talks on phone: None     Gets together: None     Attends Congregational service: None     Active member of club or organization: None     Attends meetings of clubs or organizations: None     Relationship status: None     Intimate partner violence:     Fear of current or ex partner: None     Emotionally abused: None     Physically abused: None     Forced sexual activity: None   Other Topics Concern      Service Not Asked     Blood Transfusions Not Asked     Caffeine Concern Yes     Occupational Exposure Not Asked     Hobby Hazards Not Asked     Sleep Concern Not Asked     Stress Concern  Not Asked     Weight Concern Not Asked     Special Diet Not Asked     Back Care Not Asked     Exercise Not Asked     Bike Helmet Not Asked     Seat Belt Not Asked     Self-Exams Not Asked     Parent/sibling w/ CABG, MI or angioplasty before 65F 55M? No   Social History Narrative     None       ROS: 10 point ROS neg other than the symptoms noted above in the HPI.  EXAM  Constitutional: healthy, alert and no distress     Psychiatric: mentation appears normal and affect normal/bright     LEFT FOOT FOCUSED     VASCULAR:  -Dorsalis pedis pulse +2/4 b/l  -Posterior tibial pulse +2/4 b/l  -Capillary refill time < 3 seconds to b/l hallux  -Hair growth Present to b/l anterior legs and ankles     NEURO:  -Light touch sensation intact to b/l plantar forefoot     DERM:  -Skin along incision sites on dorsal LEFT 2nd MTPJ is well approximated with no dehiscence  ---Minimal erythema   ---Minimal calor melchor-incision -- par with post operative course  ---Mild melchor-incision and forefoot edema with no ecchymosis --par with post-operative course    ---No dark or ascending erythema, no moderate calor, no malodor, no purulence, no other SOI.    -Toenails normotrophic x 5 and painted with red nail polish     MSK:  -No pain along incision site over LEFT 2nd MTPJ    MRI LEFT FOOT 10/31/2018  IMPRESSION:   1. 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  2. Mild to moderate degenerative change in the first metatarsal  phalangeal joint.     YARELY OLIVIER MD     RADIOGRAPHS LEFT FOOT 04/09/2019  IMPRESSION: Postoperative changes are distal second metatarsal       BENSON MD JULISSA  ============================================================     ASSESSMENT:  (M92.72) Osteochondrosis of head of second metatarsal bone of left foot  (primary encounter diagnosis)     (I41.323I) Plantar plate injury, left, subsequent encounter     (M20.42) Hammer toe of left  foot          PLAN:  -Patient evaluated and examined. Treatment options discussed with no educational barriers noted.  -Last of the sutures removed without dehiscence  ---Patient may start showering on Saturday, 04/27/2019 and getting the incision site wet  -Patient still has proper alignment of her second toe.   ---Continue to look for SOI (redness, swelling, pain, purulence, fever, chills, nausea, vomiting) and return to podiatry or the ED if she has any SOI. She is in agreement with this plan.  -Continue limited WB with the CAM boot using crutches or a knee scooter  ---She may start walking in the CAM boot on Monday, 04/29/2019  -Will transition patient to a regular shoe at six weeks.  -WB x-rays ordered -- to be obtained prior to appointment  -Patient in agreement with the above treatment plan and all of patient's questions were answered.        RTC 04/08/2019 with x-rays prior  --Will consider transition to tennis shoes that weekend after the next appointment if patient is doing well      Greta Pacheco DPM

## 2019-05-08 ENCOUNTER — ANCILLARY PROCEDURE (OUTPATIENT)
Dept: GENERAL RADIOLOGY | Facility: OTHER | Age: 54
End: 2019-05-08
Attending: PODIATRIST
Payer: COMMERCIAL

## 2019-05-08 ENCOUNTER — OFFICE VISIT (OUTPATIENT)
Dept: PODIATRY | Facility: OTHER | Age: 54
End: 2019-05-08
Attending: PODIATRIST
Payer: COMMERCIAL

## 2019-05-08 VITALS
SYSTOLIC BLOOD PRESSURE: 112 MMHG | OXYGEN SATURATION: 99 % | HEART RATE: 80 BPM | TEMPERATURE: 98.2 F | DIASTOLIC BLOOD PRESSURE: 82 MMHG

## 2019-05-08 DIAGNOSIS — M92.72 OSTEOCHONDROSIS OF HEAD OF SECOND METATARSAL BONE OF LEFT FOOT: Primary | ICD-10-CM

## 2019-05-08 DIAGNOSIS — M20.42 HAMMER TOE OF LEFT FOOT: ICD-10-CM

## 2019-05-08 DIAGNOSIS — M92.72 OSTEOCHONDROSIS OF HEAD OF SECOND METATARSAL BONE OF LEFT FOOT: ICD-10-CM

## 2019-05-08 DIAGNOSIS — S99.922D PLANTAR PLATE INJURY, LEFT, SUBSEQUENT ENCOUNTER: ICD-10-CM

## 2019-05-08 PROCEDURE — 99024 POSTOP FOLLOW-UP VISIT: CPT | Performed by: PODIATRIST

## 2019-05-08 PROCEDURE — 73630 X-RAY EXAM OF FOOT: CPT | Mod: TC

## 2019-05-08 ASSESSMENT — PAIN SCALES - GENERAL: PAINLEVEL: NO PAIN (0)

## 2019-05-08 NOTE — LETTER
May 8, 2019      Patience Mireles  [address] [place] [address]        To Whom It May Concern:    Patience Mireles was seen in our clinic. She may return to work without restrictions on Monday, 05/13/2019.       Sincerely,        Greta Pacheco DPM

## 2019-05-08 NOTE — NURSING NOTE
"Chief Complaint   Patient presents with     Surgical Followup       Initial /82   Pulse 80   Temp 98.2  F (36.8  C)   SpO2 99%  Estimated body mass index is 30.04 kg/m  as calculated from the following:    Height as of 4/1/19: 1.626 m (5' 4\").    Weight as of 4/1/19: 79.4 kg (175 lb).  Medication Reconciliation: complete    Belle Price LPN    "

## 2019-05-08 NOTE — PROGRESS NOTES
Chief complaint: Patient presents with:  Surgical Followup        History of Present Illness: This 53 year old female is seen post-up evaluation of a LEFT foot 2nd metatarsal Weil osteotomy and plantar plate repair (DOS 04/01/2019). She is here for a suture removal of two more suturs. She has minimal to no pain along her incision site. She has more tenderness to the bottom of the foot including the heel and the plantar forefoot. It is worse in the morning and gets better throughout the day. She has been walking in her CAM boot since last Monday without any issues.    She has been wearing her CAM boot consistently and offloading her foot completely with a knee crutch. She is hoping to start WB soon. She got some new [place] shoes and they are extra wide. No further pedal complaints today.      History of pain: Pain has been present for over five years. Dr. Lee Smith gave her an injection a couple times, but she sought a second opinion by Dr. Woody through [facility]. He also provided a couple injections which temporarily helped, but the pain came back. She was scheduled for surgery with Dr. Woody, but she thought she may want a second opinion before jumping into surgery and she cancelled the surgery. Dr. Woody then moved to [place]. She presented to this clinic for an opinion on what she should have done.       /82   Pulse 80   Temp 98.2  F (36.8  C)   SpO2 99%     Patient Active Problem List   Diagnosis     Menopause     ACP (advance care planning)     Status post WERNER-BSO       Past Surgical History:   Procedure Laterality Date     BUNIONECTOMY Left 4/1/2019    Procedure: LEFT FOOT SECOND DIGIT WEIL OSTEOTOMY WITH POSSIBLE PLANTAR PLATE REPAIR;  Surgeon: Greta Pacheco DPM;  [facility]     COLONOSCOPY N/A 11/6/2014    Procedure: COLONOSCOPY;  Surgeon: Quincy Fontanez MD;  [facility]: [facility]     ESOPHAGOSCOPY, GASTROSCOPY, DUODENOSCOPY (EGD), COMBINED  8/9/2013    Procedure:  COMBINED ESOPHAGOSCOPY, GASTROSCOPY, DUODENOSCOPY (EGD);   ESOPHAGOGASTRODUODENOSCOPY WITH BIOPSIES;  Surgeon: Quincy Fontanez MD;  Location: [facility]     HYSTERECTOMY TOTAL ABDOMINAL, BILATERAL SALPINGO-OOPHORECTOMY, COMBINED N/A 01/01/2004       Current Outpatient Medications   Medication     estradiol (VIVELLE-DOT) 0.1 MG/24HR bi-weekly patch     linaclotide (LINZESS) 145 MCG capsule     loratadine (CLARITIN) 10 MG tablet     valACYclovir (VALTREX) 1000 mg tablet     No current facility-administered medications for this visit.         No Known Allergies    Family History   Problem Relation Age of Onset     Heart Disease Mother 68        MI; cause of death     C.A.D. Father      Diabetes Father      Hypertension Father      Osteoarthritis Father        Social History     Socioeconomic History     Marital status: Single     Spouse name: None     Number of children: None     Years of education: None     Highest education level: None   Occupational History     None   Social Needs     Financial resource strain: None     Food insecurity:     Worry: None     Inability: None     Transportation needs:     Medical: None     Non-medical: None   Tobacco Use     Smoking status: Former Smoker     Types: Cigarettes     Smokeless tobacco: Never Used   Substance and Sexual Activity     Alcohol use: Yes     Alcohol/week: 0.0 oz     Drug use: None     Sexual activity: None   Lifestyle     Physical activity:     Days per week: None     Minutes per session: None     Stress: None   Relationships     Social connections:     Talks on phone: None     Gets together: None     Attends Rastafarian service: None     Active member of club or organization: None     Attends meetings of clubs or organizations: None     Relationship status: None     Intimate partner violence:     Fear of current or ex partner: None     Emotionally abused: None     Physically abused: None     Forced sexual activity: None   Other Topics Concern      Service Not  Asked     Blood Transfusions Not Asked     Caffeine Concern Yes     Occupational Exposure Not Asked     Hobby Hazards Not Asked     Sleep Concern Not Asked     Stress Concern Not Asked     Weight Concern Not Asked     Special Diet Not Asked     Back Care Not Asked     Exercise Not Asked     Bike Helmet Not Asked     Seat Belt Not Asked     Self-Exams Not Asked     Parent/sibling w/ CABG, MI or angioplasty before 65F 55M? No   Social History Narrative     None       ROS: 10 point ROS neg other than the symptoms noted above in the HPI.  EXAM  Constitutional: healthy, alert and no distress     Psychiatric: mentation appears normal and affect normal/bright     LEFT FOOT FOCUSED     VASCULAR:  -Dorsalis pedis pulse +2/4 b/l  -Posterior tibial pulse +2/4 b/l  -Capillary refill time < 3 seconds to b/l hallux  -Hair growth Present to b/l anterior legs and ankles     NEURO:  -Light touch sensation intact to b/l plantar forefoot     DERM:  -Skin along incision sites on dorsal LEFT 2nd MTPJ is well approximated with no dehiscence  ---Minimal erythema   ---Moderate edema to LEFT 2nd toe  ---Minimal calor melchor-incision -- par with post operative course  ---Mild melchor-incision and forefoot edema with no ecchymosis --par with post-operative course    ---No dark or ascending erythema, no moderate calor, no malodor, no purulence, no other SOI.    -Toenails normotrophic x 5 and painted with red nail polish     MSK:  -No pain along incision site over LEFT 2nd MTPJ and no plantar pain    MRI LEFT FOOT 10/31/2018  IMPRESSION:   1. 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  2. Mild to moderate degenerative change in the first metatarsal  phalangeal joint.     YARELY OLIVIER MD     RADIOGRAPHS LEFT FOOT 04/09/2019  IMPRESSION: Postoperative changes are distal second metatarsal       BENSON MD JULISSA    RADIOGRAPHS 05/08/2019  FINDINGS:  There are 2 metallic fixation  device is seen in the distal  second metatarsal. These are stable and unchanged from previous  examination on April 9. The remainder the foot appears intact                                                                       IMPRESSION: No change in the appearance of the foot from previous  examination on April 9, 2019    ============================================================     ASSESSMENT:  (M92.72) Osteochondrosis of head of second metatarsal bone of left foot  (primary encounter diagnosis)     (S99.922D) Plantar plate injury, left, subsequent encounter     (M20.42) Hammer toe of left foot          PLAN:  -Patient evaluated and examined. Treatment options discussed with no educational barriers noted.  -Patient still has proper alignment of her second toe. The edema is normal post surgery. She is advised to continue compression socks for edema control.  -Patient to start slowly transitioning into a regular tennis shoe this upcoming weekend.  -Patient may return to work on Monday, 5/13/2019. 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  -Patient may start slowly start exercising with walking and gradually increase her distance, speed and incline (same progression with biking). She should refrain from more rigorous activities (running, jumping, etc.) for at least one more month.  ---Continue to look for SOI (redness, swelling, pain, purulence, fever, chills, nausea, vomiting) and return to podiatry or the ED if she has any SOI. She is in agreement with this plan.  -WB x-rays ordered -- to be obtained prior to appointment  -Patient has her own OTC inserts she purchased with a built-in metatarsal pad. She will start wearing these to see if they are comfortable.  -Patient in agreement with the above treatment plan and all of patient's questions were answered.        RTC one month for final follow-up      Greta Pacheco DPM

## 2019-06-07 ENCOUNTER — ANCILLARY PROCEDURE (OUTPATIENT)
Dept: MAMMOGRAPHY | Facility: OTHER | Age: 54
End: 2019-06-07
Attending: PHYSICIAN ASSISTANT
Payer: COMMERCIAL

## 2019-06-07 DIAGNOSIS — Z12.31 VISIT FOR SCREENING MAMMOGRAM: ICD-10-CM

## 2019-06-07 PROCEDURE — 77063 BREAST TOMOSYNTHESIS BI: CPT | Mod: TC

## 2019-06-07 PROCEDURE — 77067 SCR MAMMO BI INCL CAD: CPT | Mod: TC

## 2019-06-11 ENCOUNTER — OFFICE VISIT (OUTPATIENT)
Dept: PODIATRY | Facility: OTHER | Age: 54
End: 2019-06-11
Attending: PODIATRIST
Payer: COMMERCIAL

## 2019-06-11 VITALS — SYSTOLIC BLOOD PRESSURE: 114 MMHG | DIASTOLIC BLOOD PRESSURE: 74 MMHG | TEMPERATURE: 97.5 F | HEART RATE: 78 BPM

## 2019-06-11 DIAGNOSIS — S99.922D PLANTAR PLATE INJURY, LEFT, SUBSEQUENT ENCOUNTER: ICD-10-CM

## 2019-06-11 DIAGNOSIS — M20.42 HAMMER TOE OF LEFT FOOT: ICD-10-CM

## 2019-06-11 DIAGNOSIS — M92.72 OSTEOCHONDROSIS OF HEAD OF SECOND METATARSAL BONE OF LEFT FOOT: Primary | ICD-10-CM

## 2019-06-11 DIAGNOSIS — M92.72 OSTEOCHONDROSIS OF HEAD OF SECOND METATARSAL BONE OF LEFT FOOT: ICD-10-CM

## 2019-06-11 PROCEDURE — 99024 POSTOP FOLLOW-UP VISIT: CPT | Performed by: PODIATRIST

## 2019-06-11 PROCEDURE — 73630 X-RAY EXAM OF FOOT: CPT | Mod: TC

## 2019-06-11 ASSESSMENT — PAIN SCALES - GENERAL: PAINLEVEL: NO PAIN (0)

## 2019-06-11 NOTE — NURSING NOTE
"Chief Complaint   Patient presents with     Surgical Followup       Initial /74 (Cuff Size: Adult Regular)   Pulse 78   Temp 97.5  F (36.4  C) (Tympanic)  Estimated body mass index is 30.04 kg/m  as calculated from the following:    Height as of 4/1/19: 1.626 m (5' 4\").    Weight as of 4/1/19: 79.4 kg (175 lb).  Medication Reconciliation: complete    Sachi Rasmussen LPN    "

## 2019-07-16 NOTE — MR AVS SNAPSHOT
After Visit Summary   12/11/2017    Patience Mireles    MRN: [id number]           Patient Information     Date Of Birth          1965        Visit Information        Provider Department      12/11/2017 10:00 AM [facility]        Today's Diagnoses     Routine general medical examination at a health care facility    -  1    Menopause        Oral herpes        Encounter for screening mammogram for breast cancer          Care Instructions    IFOB take home test (screen for blood in stool) with instructions included given.    Mammogram to be scheduled. You will be contacted by [facility] diagnostic imaging to make this appointment. Please call the nurse at [phone] if you have not been contacted in a timely manner to schedule.    Return for annual exam in 1 year with Morelia Rosenthal.            Follow-ups after your visit        Future tests that were ordered for you today     Open Future Orders        Priority Expected Expires Ordered    Immunos occult blood Routine  12/11/2018 12/11/2017    MA Screening Digital Bilateral Routine  12/11/2018 12/11/2017            Who to contact     If you have questions or need follow up information about today's clinic visit or your schedule please contact [facility] directly at [phone].  Normal or non-critical lab and imaging results will be communicated to you by MyChart, letter or phone within 4 business days after the clinic has received the results. If you do not hear from us within 7 days, please contact the clinic through [employer] or phone. If you have a critical or abnormal lab result, we will notify you by phone as soon as possible.  Submit refill requests through [employer] or call your pharmacy and they will forward the refill request to us. Please allow 3 business days for your refill to be completed.          Additional Information About Your Visit        MyChart Information     [employer] gives you  Chief Complaint   Patient presents with    Weight Loss    Decreased Appetite     she is a 33y.o. year old female who presents for evalution. Had strep throat last year, since then she has had problem with lack of appetite and has lost  A lot of weight . She had a GI eval and this led to getting her gallbladder removed. She has early satiety and then nausea if she tries to eat. She has also tried marijuana which does not work consistently. She typically takes a few bites and then feels full and then nauseted    She had the gallbladder removed because she was having a lot of burning in the stomach    She admits to pain in the hands and writs and knees  She has hand stiffness and hands sometimes \"splotchy\". In the am she is stiff and gets better as the day goes on            Reviewed PmHx, RxHx, FmHx, SocHx, AllgHx and updated and dated in the chart. Aspirin yes ____   No____ N/A____    Patient Active Problem List    Diagnosis    Patellofemoral arthralgia of both knees    Sacroiliac joint dysfunction of both sides    Acne rosacea    S/P laparoscopic cholecystectomy     Robotic-assisted laparoscopic cholecystectomy with firefly.       Tobacco abuse    Acne vulgaris    Chronic nausea    Anxiety    Cigarette nicotine dependence without complication    Dysthymia       Nurse notes were reviewed and copied and are correct  Review of Systems - negative except as listed above in the HPI    Objective:     Vitals:    07/16/19 1312   BP: 113/79   Pulse: 86   Resp: 16   Temp: 98.3 °F (36.8 °C)   TempSrc: Oral   SpO2: 99%   Weight: 76 lb (34.5 kg)   Height: 5' 1\" (1.549 m)     Physical Examination: General appearance - alert, very thin and emaciated in appearance, and in no distress  Mental status - alert, oriented to person, place, and time  Chest - clear to auscultation, no wheezes, rales or rhonchi, symmetric air entry  Heart - normal rate, regular rhythm, normal S1, S2, no murmurs, rubs, clicks or "secure access to your electronic health record. If you see a primary care provider, you can also send messages to your care team and make appointments. If you have questions, please call your primary care clinic.  If you do not have a primary care provider, please call [phone] and they will assist you.        Care EveryWhere ID     This is your Care EveryWhere ID. This could be used by other organizations to access your [place] medical records  [id number]        Your Vitals Were     Pulse Height                60 5' 5.5\" (1.664 m)           Blood Pressure from Last 3 Encounters:   12/11/17 118/80   08/24/17 122/82   08/01/17 112/76    Weight from Last 3 Encounters:   08/01/17 160 lb (72.6 kg)   06/15/17 160 lb (72.6 kg)   03/11/16 160 lb (72.6 kg)                 Where to get your medicines      These medications [facility] [place] [facility] [address]  [address] [place] [address]     Phone:  [phone]     estradiol 0.1 MG/24HR BIW patch    estradiol 0.5 MG tablet    valACYclovir 1000 mg tablet          Primary Care Provider Office Phone # Fax #    CAROLINA Mederos [phone]       [facility] [address]  [place] [address]        Equal Access to Services     TASIA ARROYO AH: Hadii radha ku hadasho Soomaali, waaxda luqadaha, qaybta kaalmada adeegyada, niall samano . So [facility] [phone].    ATENCIÓN: Si habla español, tiene a mckinney disposición servicios gratuitos de asistencia lingüística. Jamaal al [phone].    We comply with applicable federal civil rights laws and [place] laws. We do not discriminate on the basis of race, color, national origin, age, disability, sex, sexual orientation, or gender identity.            Thank you!     Thank you for choosing [facility]  for your care. Our goal is always to provide you with excellent care. Hearing back from our patients is one way we can " gallops  Abdomen - tenderness noted midepigastric area  Extremities - very thin no erthema and no swelling  Skin - redness and many papules and pustules on cheeks and forehead         Assessment/ Plan:   Diagnoses and all orders for this visit:    1. Weight loss, unintentional  -     REFERRAL TO GASTROENTEROLOGY  -     REFERRAL TO RHEUMATOLOGY  -     METABOLIC PANEL, COMPREHENSIVE  -     CBC WITH AUTOMATED DIFF  In my differential is Carcinoid of the stomach. I am referring to GI to evaluate. 2. Screening for malnutrition  -     VITAMIN B12  -     FOLATE  -     METABOLIC PANEL, COMPREHENSIVE  -     CBC WITH AUTOMATED DIFF    3. Facial rash  -     BENJAMIN BY MULTIPLEX FLOW IA, QL  -     RHEUMATOID FACTOR, QL  Also concerned she may have lupus. This rash has not been identified yet  4. Multiple joint pain  -     BENJAMIN BY MULTIPLEX FLOW IA, QL  -     RHEUMATOID FACTOR, QL  -     REFERRAL TO RHEUMATOLOGY  -     predniSONE (DELTASONE) 10 mg tablet; Take 4 tab ea day for 2 days, then 3 tab ea day for 2 days , then 2 tab ea day for 2 days, then 1 tab ea day for 2 days  -     METABOLIC PANEL, COMPREHENSIVE    5. Epigastric abdominal tenderness without rebound tenderness  -     METABOLIC PANEL, COMPREHENSIVE  -     LIPASE  -     CBC WITH AUTOMATED DIFF  Not sure if this is from the pancrease or stomach. Checking pancrease enzymes  Other orders  -     FOLATE       Follow-up and Dispositions    · Return in about 3 weeks (around 8/6/2019). ICD-10-CM ICD-9-CM    1. Weight loss, unintentional R63.4 783.21 REFERRAL TO GASTROENTEROLOGY      REFERRAL TO RHEUMATOLOGY      METABOLIC PANEL, COMPREHENSIVE      CBC WITH AUTOMATED DIFF   2. Screening for malnutrition Z13.21 V77.2 VITAMIN B12      FOLATE      METABOLIC PANEL, COMPREHENSIVE      CBC WITH AUTOMATED DIFF   3. Facial rash R21 782.1 BENJAMIN BY MULTIPLEX FLOW IA, QL      RHEUMATOID FACTOR, QL   4.  Multiple joint pain M25.50 719.49 BENJAMIN BY MULTIPLEX FLOW IA, QL      RHEUMATOID FACTOR, QL      REFERRAL TO RHEUMATOLOGY      predniSONE (DELTASONE) 10 mg tablet      METABOLIC PANEL, COMPREHENSIVE   5. Epigastric abdominal tenderness without rebound tenderness W10.342 544.08 METABOLIC PANEL, COMPREHENSIVE      LIPASE      CBC WITH AUTOMATED DIFF       I have discussed the diagnosis with the patient and the intended plan as seen in the above orders. The patient has received an after-visit summary and questions were answered concerning future plans. Medication Side Effects and Warnings were discussed with patient: yes  Patient Labs were reviewed and or requested: yes  Patient Past Records were reviewed and or requested: yes        There are no Patient Instructions on file for this visit.     The patient verbalizes understanding and agrees with the plan of care        Patient has the advanced directives booklet to review continue to improve our services. Please take a few minutes to complete the written survey that you may receive in the mail after your visit with us. Thank you!             Your Updated Medication List - Protect others around you: Learn how to safely use, store and throw away your medicines at www.disposemymeds.org.          This list is accurate as of: 12/11/17 10:51 AM.  Always use your most recent med list.                   Brand Name Dispense Instructions for use Diagnosis    * estradiol 0.5 MG tablet    ESTRACE    90 tablet    TAKE 1 OR 2 TABLETS BY MOUTH EVERY DAY AS NEEDED    Menopause       * estradiol 0.1 MG/24HR BIW patch    VIVELLE-DOT    12 patch    Place 1 patch onto the skin once a week    Menopause       linaclotide 145 MCG capsule    LINZESS    30 capsule    Take 1 capsule (145 mcg) by mouth every morning (before breakfast)    Flatulence, eructation, and gas pain       loratadine 10 MG tablet    CLARITIN     Take 10 mg by mouth as needed        valACYclovir 1000 mg tablet    VALTREX    8 tablet    Take 2 tablets (2,000 mg) by mouth 2 times daily    Oral herpes       * Notice:  This list has 2 medication(s) that are the same as other medications prescribed for you. Read the directions carefully, and ask your doctor or other care provider to review them with you.

## 2019-07-31 DIAGNOSIS — S99.922D PLANTAR PLATE INJURY, LEFT, SUBSEQUENT ENCOUNTER: ICD-10-CM

## 2019-07-31 DIAGNOSIS — G89.18 POST-OP PAIN: Primary | ICD-10-CM

## 2019-07-31 DIAGNOSIS — M20.42 HAMMER TOE OF LEFT FOOT: ICD-10-CM

## 2019-07-31 DIAGNOSIS — M92.72 OSTEOCHONDROSIS OF HEAD OF SECOND METATARSAL BONE OF LEFT FOOT: ICD-10-CM

## 2019-08-15 ENCOUNTER — HOSPITAL ENCOUNTER (OUTPATIENT)
Dept: PHYSICAL THERAPY | Facility: HOSPITAL | Age: 54
Setting detail: THERAPIES SERIES
End: 2019-08-15
Attending: PODIATRIST
Payer: COMMERCIAL

## 2019-08-15 DIAGNOSIS — G89.18 POST-OP PAIN: ICD-10-CM

## 2019-08-15 DIAGNOSIS — M20.42 HAMMER TOE OF LEFT FOOT: ICD-10-CM

## 2019-08-15 DIAGNOSIS — M92.72 OSTEOCHONDROSIS OF HEAD OF SECOND METATARSAL BONE OF LEFT FOOT: ICD-10-CM

## 2019-08-15 DIAGNOSIS — S99.922D PLANTAR PLATE INJURY, LEFT, SUBSEQUENT ENCOUNTER: ICD-10-CM

## 2019-08-15 PROCEDURE — 97035 APP MDLTY 1+ULTRASOUND EA 15: CPT | Mod: GP

## 2019-08-15 PROCEDURE — 97140 MANUAL THERAPY 1/> REGIONS: CPT | Mod: GP

## 2019-08-15 PROCEDURE — 97110 THERAPEUTIC EXERCISES: CPT | Mod: GP

## 2019-08-15 PROCEDURE — 97161 PT EVAL LOW COMPLEX 20 MIN: CPT | Mod: GP

## 2019-08-15 NOTE — PROGRESS NOTES
08/15/19 0700   General Information   Type of Visit Initial OP Ortho PT Evaluation   Start of Care Date 08/15/19   Referring Physician Dr. Pacheco   Orders Evaluate and Treat   Orders Comment Patient had surgery to fix a LEFT plantar 2nd MTPJ plantar plate tear and she had a Weil osteotomy to shorten the metatarsal due to osteochondrosis of the metatarsal head. She continues to have pain with limited ROM of the 2nd MTPJ. Please work on ROM (more PLANTARFLEXION than DORSIFLEXION), iontophoresis, and other modalities per your recommendation for the 2nd MTPJ pain.    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 Since her surgery her toes feels like its frozen in place. She got a referral for orthotics and was given pad for her shoe but she feels it made pain worse. Pt is unsure how much she can stretch the toe without injuring it. Feels like she is not walking right due to pain and stiffness in toe. Pt is interested in trying ultrasound today   Impairments A. Pain;D. Decreased ROM;E. Decreased flexibility;K. Numbness;H. Impaired gait   Functional Limitations perform activities of daily living   Symptom Location L foot, 2nd toe   Pain rating (0-10 point scale) Best (/10);Worst (/10)   Best (/10) 3   Worst (/10) 5   Pain quality E. Shooting;G. Cramping   Frequency of pain/symptoms C. With activity   Pain/symptoms are: Worse during the night   Pain/symptoms exacerbated by B. Walking;I. Bending    Pain/symptoms eased by C. Rest   Current Level of Function   Patient role/employment history A. Employed   Fall Risk Screen   Fall screen completed by PT   Have you fallen 2 or more times in the past year? No   Have you fallen and had an injury in the past year? No   Is patient a fall risk? No   Ankle/Foot Objective Findings   Side (if bilateral, select both right and left) Left   Integumentary Mild edema 2nd toe   Gait/Locomotion No obvious impairments, pain in 2nd toe with WB   Ankle/Foot Flexibility Comments Impaired ROM L 2nd toe. No active extension, able to actively flex toe   Palpation Pain at end range flexion/extension on L 2nd toe   Left DF (Knee Ext) AROM Full   Left PF AROM Full   Left Calcanceal Inversion AROM Full   Left Calcaneal Eversion AROM Full   Left Great Toe Flexion AROM Full   Planned Therapy Interventions   Planned Therapy Interventions gait training;joint mobilization;manual therapy;ROM;stretching;strengthening   Planned Modality Interventions   Planned Modality Interventions Cryotherapy;Electrical stimulation;Hot packs;Ultrasound   Planned Modality Interventions Comments as needed   Clinical Impression   Criteria for Skilled Therapeutic Interventions Met yes, treatment indicated   PT Diagnosis Toe pain, impaired ROM   Influenced by the following impairments Impaired L digit ROM, impaired gait, pain limiting function   Functional limitations due to impairments Decreased tolerance for functional mobility   Clinical Presentation Stable/Uncomplicated   Clinical Presentation Rationale Clinical judgement   Clinical Decision Making (Complexity) Low complexity   Therapy Frequency   (1-2x/week)   Predicted Duration of Therapy Intervention (days/wks) up to 90 days   Risk & Benefits of therapy have been explained Yes   Patient, Family & other staff in agreement with plan of care Yes   Clinical Impression Comments L toe pain and impaired ROM expected to improve with skilled PT services   ORTHO GOALS    PT Ortho Eval Goals 1;3;2   Ortho Goal 1   Goal Identifier STG 1   Goal Description Pt will demonstrate knowledge/understanding of HEP and report daily compliance   Target Date 08/29/19   Ortho Goal 2   Goal Identifier LTG 1   Goal Description Pt will ambulate without being limited by L toe pain or impaired ROM   Target Date 11/13/19   Total Evaluation Time   PT Eval, Low Complexity Minutes (13057) 15

## 2019-08-20 ENCOUNTER — HOSPITAL ENCOUNTER (OUTPATIENT)
Dept: PHYSICAL THERAPY | Facility: HOSPITAL | Age: 54
Setting detail: THERAPIES SERIES
End: 2019-08-20
Attending: PODIATRIST
Payer: COMMERCIAL

## 2019-08-20 PROCEDURE — 97140 MANUAL THERAPY 1/> REGIONS: CPT | Mod: GP

## 2019-08-20 PROCEDURE — 97035 APP MDLTY 1+ULTRASOUND EA 15: CPT | Mod: GP

## 2019-08-26 ENCOUNTER — HOSPITAL ENCOUNTER (OUTPATIENT)
Dept: PHYSICAL THERAPY | Facility: HOSPITAL | Age: 54
Setting detail: THERAPIES SERIES
End: 2019-08-26
Attending: PODIATRIST
Payer: COMMERCIAL

## 2019-08-26 PROCEDURE — 97140 MANUAL THERAPY 1/> REGIONS: CPT | Mod: GP

## 2019-08-26 PROCEDURE — 97035 APP MDLTY 1+ULTRASOUND EA 15: CPT | Mod: GP

## 2019-09-05 ENCOUNTER — HOSPITAL ENCOUNTER (OUTPATIENT)
Dept: PHYSICAL THERAPY | Facility: HOSPITAL | Age: 54
Setting detail: THERAPIES SERIES
End: 2019-09-05
Attending: PHYSICIAN ASSISTANT
Payer: COMMERCIAL

## 2019-09-05 PROCEDURE — 40000268 ZZH STATISTIC NO CHARGES

## 2020-06-29 ENCOUNTER — ANCILLARY PROCEDURE (OUTPATIENT)
Dept: MAMMOGRAPHY | Facility: OTHER | Age: 55
End: 2020-06-29
Attending: PHYSICIAN ASSISTANT
Payer: COMMERCIAL

## 2020-06-29 DIAGNOSIS — Z12.31 VISIT FOR SCREENING MAMMOGRAM: ICD-10-CM

## 2020-06-29 PROCEDURE — 77063 BREAST TOMOSYNTHESIS BI: CPT | Mod: TC

## 2020-06-29 PROCEDURE — 77067 SCR MAMMO BI INCL CAD: CPT | Mod: TC

## 2020-08-03 DIAGNOSIS — B00.2 ORAL HERPES: ICD-10-CM

## 2020-08-03 DIAGNOSIS — Z78.0 MENOPAUSE: ICD-10-CM

## 2020-08-05 NOTE — TELEPHONE ENCOUNTER
Estradiol       Last Written Prescription Date:  3/25/2019  Last Fill Quantity: 12,   # refills: 4    Valtrex       Last Written Prescription Date:  3/25/2019  Last Fill Quantity: 16,   # refills: PRN  Last Office Visit: 6/11/2019  Future Office visit:

## 2020-08-06 RX ORDER — VALACYCLOVIR HYDROCHLORIDE 1 G/1
TABLET, FILM COATED ORAL
Qty: 8 TABLET | Status: SHIPPED | OUTPATIENT
Start: 2020-08-06 | End: 2022-04-27

## 2020-08-06 RX ORDER — ESTRADIOL 0.1 MG/D
PATCH TRANSDERMAL
Qty: 12 PATCH | Refills: 0 | Status: SHIPPED | OUTPATIENT
Start: 2020-08-06 | End: 2021-01-18

## 2020-08-17 ENCOUNTER — TRANSFERRED RECORDS (OUTPATIENT)
Dept: HEALTH INFORMATION MANAGEMENT | Facility: HOSPITAL | Age: 55
End: 2020-08-17

## 2020-08-17 ENCOUNTER — MEDICAL CORRESPONDENCE (OUTPATIENT)
Dept: HEALTH INFORMATION MANAGEMENT | Facility: HOSPITAL | Age: 55
End: 2020-08-17

## 2020-08-20 ENCOUNTER — OFFICE VISIT (OUTPATIENT)
Dept: SURGERY | Facility: OTHER | Age: 55
End: 2020-08-20
Attending: SURGERY
Payer: COMMERCIAL

## 2020-08-20 VITALS
SYSTOLIC BLOOD PRESSURE: 122 MMHG | BODY MASS INDEX: 28.96 KG/M2 | HEIGHT: 66 IN | RESPIRATION RATE: 16 BRPM | TEMPERATURE: 96.8 F | HEART RATE: 68 BPM | WEIGHT: 180.2 LBS | DIASTOLIC BLOOD PRESSURE: 82 MMHG

## 2020-08-20 DIAGNOSIS — R10.13 EPIGASTRIC PAIN: Primary | ICD-10-CM

## 2020-08-20 PROCEDURE — 99204 OFFICE O/P NEW MOD 45 MIN: CPT | Performed by: SURGERY

## 2020-08-20 ASSESSMENT — PAIN SCALES - GENERAL: PAINLEVEL: NO PAIN (1)

## 2020-08-20 ASSESSMENT — MIFFLIN-ST. JEOR: SCORE: 1424.13

## 2020-08-20 NOTE — PROGRESS NOTES
GENERAL SURGERY CONSULTATION NOTE    Patience Mireles   [address]   [place] [address]  55 year old  female    Primary Care Provider:  Morelia Rosenthal      HPI: Patience Mireles presents to clinic with chronic epigastric pain.  Patient states this problem is been going on for 7 or 8 years.  The pain is usually 3 or 4.  She will get spikes pain to 7 or 8.  She describes pain as a dull ache that is sometimes sharp immediately posterior to her xiphoid that radiates laterally both ways.  It is dull throughout the day and will sometimes get worse randomly at night.  If she lays on her side it is worse.  She denies association with food.  However, she notes that 1 evening several weeks ago she had a meal of fried fish and beer that gave her an acute attack.  When her pain is bad she does have nausea.  She denies vomiting.  Bowel movements are normal.  She has chronic, mild constipation.  She takes NSAIDs sparingly she does not smoke.  She drinks occasionally.  She has had a trial of proton pump inhibitor in the past and this has not helped.  She notes she does get acid reflux once or twice a month with the classic substernal burning chest pain and water brash.  With her pain is bad she notes she does have some trouble swallowing, and feels like food gets  held up in her chest.  She had upper endoscopy in 2013 that was positive for only mild gastritis.  Colonoscopy is negative.  Right upper quadrant ultrasound and HIDA scan were normal for gallbladder disease.  Surgical history includes WERNER-BSO for endometriosis and cervical dysplasia.  No previous history of trauma to the upper abdomen.     REVIEW OF SYSTEMS:    GENERAL: No fevers or chills. Denies fatigue, recent weight loss.  HEENT: No sinus drainage. No changes with vision or hearing. No difficulty swallowing.   LYMPHATICS:  Noswollen nodes in axilla, neck or groin.  CARDIOVASCULAR: Denies chest pain, palpitations and dyspnea on exertion.  PULMONARY: No  shortness of breath or cough. No increase in sputum production.  GI: Denies melena,bright red blood in stools. No hematemesis. No constipation or diarrhea.  : No dysuria or hematuria.  SKIN: No recent rashes or ulcers.   HEMATOLOGY:  No history of easy bruising or bleeding.  ENDOCRINE:  No history of diabetes or thyroid problems.  NEUROLOGY:  No history of seizures or headaches. No motor or sensory changes.        Patient Active Problem List   Diagnosis     Menopause     ACP (advance care planning)     Status post WERNER-BSO       Past Medical History:   Diagnosis Date     Asymptomatic varicose veins 7/22/2003     Carcinoma in situ of cervix uteri 9/20/2000     Chest pain, unspecified 6/5/2004     Dysmenorrhea 8/21/2000     Endometriosis of uterus 12/20/2000     Endometriosis, site unspecified 10/26/2000     Follow-up examination, following unspecified surgery 4/16/2001     Leukorrhea, not specified as infective 7/11/2007     Need for prophylactic vaccination and inoculation against viral hepatitis 1/20/2009     Nonallopathic lesion of cervical region, not elsewhere classified 2/23/2001     Other screening mammogram 8/29/2000     PONV (postoperative nausea and vomiting)      Routine general medical examination at a health care facility 12/19/2001       Past Surgical History:   Procedure Laterality Date     BUNIONECTOMY Left 4/1/2019    Procedure: LEFT FOOT SECOND DIGIT WEIL OSTEOTOMY WITH POSSIBLE PLANTAR PLATE REPAIR;  Surgeon: Greta Pacheco DPM;  Location: [facility]     COLONOSCOPY N/A 11/6/2014    Procedure: COLONOSCOPY;  Surgeon: Quincy Fontanez MD;  Location: [facility] OR     ESOPHAGOSCOPY, GASTROSCOPY, DUODENOSCOPY (EGD), COMBINED  8/9/2013    Procedure: COMBINED ESOPHAGOSCOPY, GASTROSCOPY, DUODENOSCOPY (EGD);   ESOPHAGOGASTRODUODENOSCOPY WITH BIOPSIES;  Surgeon: Quincy Fontanez MD;  Location: [facility] OR     HYSTERECTOMY TOTAL ABDOMINAL, BILATERAL SALPINGO-OOPHORECTOMY, COMBINED N/A 01/01/2004       Family History    Problem Relation Age of Onset     Heart Disease Mother 68        MI; cause of death     C.A.D. Father      Diabetes Father      Hypertension Father      Osteoarthritis Father        Social History     Social History Narrative     Not on file       Social History     Socioeconomic History     Marital status: Single     Spouse name: Not on file     Number of children: Not on file     Years of education: Not on file     Highest education level: Not on file   Occupational History     Not on file   Social Needs     Financial resource strain: Not on file     Food insecurity     Worry: Not on file     Inability: Not on file     Transportation needs     Medical: Not on file     Non-medical: Not on file   Tobacco Use     Smoking status: Former Smoker     Types: Cigarettes     Smokeless tobacco: Never Used   Substance and Sexual Activity     Alcohol use: Yes     Alcohol/week: 0.0 standard drinks     Drug use: Not on file     Sexual activity: Not on file   Lifestyle     Physical activity     Days per week: Not on file     Minutes per session: Not on file     Stress: Not on file   Relationships     Social connections     Talks on phone: Not on file     Gets together: Not on file     Attends Yazidi service: Not on file     Active member of club or organization: Not on file     Attends meetings of clubs or organizations: Not on file     Relationship status: Not on file     Intimate partner violence     Fear of current or ex partner: Not on file     Emotionally abused: Not on file     Physically abused: Not on file     Forced sexual activity: Not on file   Other Topics Concern      Service Not Asked     Blood Transfusions Not Asked     Caffeine Concern Yes     Occupational Exposure Not Asked     Hobby Hazards Not Asked     Sleep Concern Not Asked     Stress Concern Not Asked     Weight Concern Not Asked     Special Diet Not Asked     Back Care Not Asked     Exercise Not Asked     Bike Helmet Not Asked     Seat Belt  "Not Asked     Self-Exams Not Asked     Parent/sibling w/ CABG, MI or angioplasty before 65F 55M? No   Social History Narrative     Not on file       estradiol (CLIMARA) 0.1 MG/24HR weekly patch, PLACE 1 PATCH ON THE SKIN ONCE A WEEK  linaclotide (LINZESS) 145 MCG capsule, Take 1 capsule (145 mcg) by mouth every morning (before breakfast)  loratadine (CLARITIN) 10 MG tablet, Take 10 mg by mouth as needed   valACYclovir (VALTREX) 1000 mg tablet, TAKE 2 TABLETS BY MOUTH 2 TIMES A DAY    No current facility-administered medications on file prior to visit.         ALLERGIES/SENSITIVITIES: No Known Allergies    PHYSICAL EXAM:     /82 (BP Location: Right arm, Patient Position: Sitting, Cuff Size: Adult Large)   Pulse 68   Temp 96.8  F (36  C) (Tympanic)   Resp 16   Ht 1.676 m (5' 6\")   Wt 81.7 kg (180 lb 3.2 oz)   Breastfeeding No   BMI 29.09 kg/m      General Appearance:   Sitting up in the chair, no apparent distress  HEENT: Pupils are equal and reactive, no scleral icterus,   Heart & CV:  RRR no murmur.  Intact distal pulses, good cap refill.  LUNGS: No increased work of breathing.Lugns are CTA B/L, no wheezing or crackles.  Abd: Soft, mild tenderness in the epigastrium and along bilateral costal margin.  No hernia.  No masses.  Abdomen is otherwise soft and nontender.  Nondistended.  Ext: Normal bulk and tone, no lower extremity edema  Neuro: Alert and oriented, normal speech and mentation        CONSULTATION ASSESSMENT AND PLAN:    55 year old female with chronic epigastric pain.  Differential diagnosis is wide at this point but could include things such as gallbladder disease, peptic ulcer disease, bile reflux gastritis, GERD, sphincter of Oddi dysfunction, primary esophageal disease such as nutcracker esophagus.  We will proceed with work-up starting with right upper quadrant ultrasound and abdominal CT scan.  Pending the results of this we will move to upper endoscopy and possibly diagnostic " laparoscopy.    Right upper quadrant ultrasound and abdominal CT  Follow-up with the patient after these tests are done      Damon Ruby MD on 8/20/2020 at 8:28 AM

## 2020-08-20 NOTE — NURSING NOTE
"Chief Complaint   Patient presents with     Consult     abdominal issues       Initial /82 (BP Location: Right arm, Patient Position: Sitting, Cuff Size: Adult Large)   Pulse 68   Temp 96.8  F (36  C) (Tympanic)   Resp 16   Ht 1.676 m (5' 6\")   Wt 81.7 kg (180 lb 3.2 oz)   Breastfeeding No   BMI 29.09 kg/m   Estimated body mass index is 29.09 kg/m  as calculated from the following:    Height as of this encounter: 1.676 m (5' 6\").    Weight as of this encounter: 81.7 kg (180 lb 3.2 oz).  Medication Reconciliation: complete    Kim Man LPN  "

## 2020-08-24 DIAGNOSIS — Z41.9 ELECTIVE SURGERY: Primary | ICD-10-CM

## 2020-08-25 ENCOUNTER — HOSPITAL ENCOUNTER (OUTPATIENT)
Facility: HOSPITAL | Age: 55
End: 2020-08-25
Attending: PODIATRIST | Admitting: PODIATRIST
Payer: COMMERCIAL

## 2020-08-28 ENCOUNTER — HOSPITAL ENCOUNTER (OUTPATIENT)
Dept: ULTRASOUND IMAGING | Facility: HOSPITAL | Age: 55
End: 2020-08-28
Attending: SURGERY
Payer: COMMERCIAL

## 2020-08-28 ENCOUNTER — HOSPITAL ENCOUNTER (OUTPATIENT)
Dept: CT IMAGING | Facility: HOSPITAL | Age: 55
End: 2020-08-28
Attending: SURGERY
Payer: COMMERCIAL

## 2020-08-28 DIAGNOSIS — R10.13 EPIGASTRIC PAIN: ICD-10-CM

## 2020-08-28 PROCEDURE — 25500064 ZZH RX 255 OP 636: Performed by: RADIOLOGY

## 2020-08-28 PROCEDURE — 74177 CT ABD & PELVIS W/CONTRAST: CPT | Mod: TC

## 2020-08-28 PROCEDURE — 76705 ECHO EXAM OF ABDOMEN: CPT | Mod: TC

## 2020-08-28 RX ORDER — IOPAMIDOL 612 MG/ML
100 INJECTION, SOLUTION INTRAVASCULAR ONCE
Status: COMPLETED | OUTPATIENT
Start: 2020-08-28 | End: 2020-08-28

## 2020-08-28 RX ADMIN — IOPAMIDOL 100 ML: 612 INJECTION, SOLUTION INTRAVENOUS at 12:36

## 2020-09-15 ENCOUNTER — TELEPHONE (OUTPATIENT)
Dept: SURGERY | Facility: OTHER | Age: 55
End: 2020-09-15

## 2020-09-15 DIAGNOSIS — Z01.818 PRE-OP TESTING: Primary | ICD-10-CM

## 2020-09-15 NOTE — TELEPHONE ENCOUNTER
Screening Questions for the Scheduling of Screening Colonoscopies   (If Colonoscopy is diagnostic, Provider should review the chart before scheduling.)  Are you younger than 50 or older than 80?  NO   Do you take aspirin or fish oil?  NO  (if yes, tell patient to stop 1 week prior to Colonoscopy)  Do you take warfarin (Coumadin), clopidogrel (Plavix), apixaban (Eliquis), dabigatram (Pradaxa), rivaroxaban (Xarelto) or any blood thinner? NO   Do you use oxygen at home?  NO   Do you have kidney disease? NO   Are you on dialysis? NO   Have you had a stroke or heart attack in the last year? NO   Have you had a stent in your heart or any blood vessel in the last year? NO   Have you had a transplant of any organ? NO   Have you had a colonoscopy or upper endoscopy (EGD) before? YES          When?  5 YRS   Date of scheduled EGD 09/29/2020  Provider [facility]   Pharmacy

## 2020-09-17 NOTE — PROGRESS NOTES
[facility] [place]  [address]  [facility] [address]  [phone]  Dept: [phone]    PRE-OP EVALUATION:  Today's date: 2020    Patience Mireles (: 1965) presents for pre-operative evaluation assessment as requested by Dr. Mcclellan.  She requires evaluation and anesthesia risk assessment prior to undergoing surgery/procedure for treatment of Left Foot hardware removal  .    Proposed Surgery/ Procedure: Left foot hardware removal  Date of Surgery/ Procedure: 10/19/20  Time of Surgery/ Procedure: [facility]  [place] [facility] [place] Fax Number: Note does not need to be faxed, will be available electronically in Epic.  Primary Physician: Morelia Rosenthal  Type of Anesthesia Anticipated: to be determined    Preoperative Questionnaire:   No - Have you ever had a heart attack or stroke?  No - Have you ever had surgery on your heart or blood vessels, such as a stent, coronary (heart) bypass, or surgery on an artery in the head, neck, heart, or legs?  No - Do you have chest pain when you are physically active?  No - Do you have a history of heart failure?  No - Do you currently have a cold, bronchitis, or symptoms of other respiratory (head and chest) infections?  No - Do you have a cough, shortness of breath, or wheezing?  No - Do you or anyone in your family have a history of blood clots?  No - Do you or anyone in your family have a serious bleeding problem, such as long-lasting bleeding after surgeries or cuts?  No - Have you ever had anemia or been told to take iron pills?  No - Have you had any abnormal blood loss such as black, tarry or bloody stools, or abnormal vaginal bleeding?  No - Have you ever had a blood transfusion?  Yes - Are you willing to have a blood transfusion if it is medically needed before, during, or after your surgery?  No - Have you or anyone in your family ever had problems with anesthesia (sedation for surgery)?  No - Do you have sleep apnea,  excessive snoring, or daytime drowsiness?   No - Do you have any artifical heart valves or other implanted medical devices, such as a pacemaker, defibrillator, or continuous glucose monitor?  No - Do you have any artifical joints?  No - Are you allergic to latex?  No - Is there any chance that you may be pregnant?    Patient has a Health Care Directive or Living Will:  YES on file    HPI:     HPI related to upcoming procedure: removal of hardware of her toe. Also repair of fusion.       See problem list for active medical problems.  Problems all longstanding and stable, except as noted/documented.  See ROS for pertinent symptoms related to these conditions.      MEDICAL HISTORY:     Patient Active Problem List    Diagnosis Date Noted     Status post WERNER-BSO 08/01/2017     Priority: Medium     ACP (advance care planning) 03/11/2016     Priority: Medium     Advance Care Planning 3/11/2016: ACP Review of Chart / Resources Provided:  Reviewed chart for advance care plan.  Patience Mireles has been provided information and resources to begin or update their advance care plan.  Added by Esther Sanchez             Menopause 07/31/2013     Priority: Medium     Iatrogenic menopause 2004        Past Medical History:   Diagnosis Date     Asymptomatic varicose veins 7/22/2003     Carcinoma in situ of cervix uteri 9/20/2000     Chest pain, unspecified 6/5/2004     Dysmenorrhea 8/21/2000     Endometriosis of uterus 12/20/2000     Endometriosis, site unspecified 10/26/2000     Follow-up examination, following unspecified surgery 4/16/2001     Leukorrhea, not specified as infective 7/11/2007     Need for prophylactic vaccination and inoculation against viral hepatitis 1/20/2009     Nonallopathic lesion of cervical region, not elsewhere classified 2/23/2001     Other screening mammogram 8/29/2000     PONV (postoperative nausea and vomiting)      Routine general medical examination at a health care facility 12/19/2001     Past  Surgical History:   Procedure Laterality Date     BUNIONECTOMY Left 4/1/2019    Procedure: LEFT FOOT SECOND DIGIT WEIL OSTEOTOMY WITH POSSIBLE PLANTAR PLATE REPAIR;  Surgeon: Greta Pacheco DPM;  Location: [facility]     COLONOSCOPY N/A 11/6/2014    Procedure: COLONOSCOPY;  Surgeon: Quincy Fontanez MD;  Location: [facility]     ESOPHAGOSCOPY, GASTROSCOPY, DUODENOSCOPY (EGD), COMBINED  8/9/2013    Procedure: COMBINED ESOPHAGOSCOPY, GASTROSCOPY, DUODENOSCOPY (EGD);   ESOPHAGOGASTRODUODENOSCOPY WITH BIOPSIES;  Surgeon: Quincy Fontanez MD;  Location: [facility]     HYSTERECTOMY TOTAL ABDOMINAL, BILATERAL SALPINGO-OOPHORECTOMY, COMBINED N/A 01/01/2004     Current Outpatient Medications   Medication Sig Dispense Refill     estradiol (CLIMARA) 0.1 MG/24HR weekly patch PLACE 1 PATCH ON THE SKIN ONCE A WEEK 12 patch 0     linaclotide (LINZESS) 145 MCG capsule Take 1 capsule (145 mcg) by mouth every morning (before breakfast) 30 capsule 3     loratadine (CLARITIN) 10 MG tablet Take 10 mg by mouth as needed        valACYclovir (VALTREX) 1000 mg tablet TAKE 2 TABLETS BY MOUTH 2 TIMES A DAY 8 tablet PRN     OTC products: None, except as noted above    No Known Allergies   Latex Allergy: NO    Social History     Tobacco Use     Smoking status: Former Smoker     Types: Cigarettes     Smokeless tobacco: Never Used   Substance Use Topics     Alcohol use: Yes     Alcohol/week: 0.0 standard drinks     History   Drug Use Not on file       REVIEW OF SYSTEMS:   CONSTITUTIONAL: NEGATIVE for fever, chills, change in weight  INTEGUMENTARY/SKIN: NEGATIVE for worrisome rashes, moles or lesions  EYES: NEGATIVE for vision changes or irritation  ENT/MOUTH: NEGATIVE for ear, mouth and throat problems  RESP: NEGATIVE for significant cough or SOB  BREAST: NEGATIVE for masses, tenderness or discharge  CV: NEGATIVE for chest pain, palpitations or peripheral edema  GI: NEGATIVE for nausea, abdominal pain, heartburn, or change in bowel habits  : NEGATIVE  "for frequency, dysuria, or hematuria  MUSCULOSKELETAL:see hpi   NEURO: NEGATIVE for weakness, dizziness or paresthesias  ENDOCRINE: NEGATIVE for temperature intolerance, skin/hair changes  HEME: NEGATIVE for bleeding problems  PSYCHIATRIC: NEGATIVE for changes in mood or affect    EXAM:   /74 (BP Location: Left arm, Patient Position: Sitting, Cuff Size: Adult Regular)   Pulse 72   Temp 97.6  F (36.4  C) (Tympanic)   Ht 1.676 m (5' 6\")   Wt 83.2 kg (183 lb 6.4 oz)   SpO2 99%   BMI 29.60 kg/m      GENERAL APPEARANCE: healthy, alert and no distress     EYES: EOMI, PERRL     HENT: ear canals and TM's normal and nose and mouth without ulcers or lesions     NECK: no adenopathy, no asymmetry, masses, or scars and thyroid normal to palpation     RESP: lungs clear to auscultation - no rales, rhonchi or wheezes     CV: regular rates and rhythm, normal S1 S2, no S3 or S4 and no murmur, click or rub     ABDOMEN:  soft, nontender, no HSM or masses and bowel sounds normal     MS: extremities normal- no gross deformities noted, no evidence of inflammation in joints, FROM in all extremities.     SKIN: no suspicious lesions or rashes     NEURO: Normal strength and tone, sensory exam grossly normal, mentation intact and speech normal     PSYCH: mentation appears normal. and affect normal/bright     LYMPHATICS: No cervical adenopathy    DIAGNOSTICS:   EKG: appears normal, NSR, normal axis, normal intervals, no acute ST/T changes c/w ischemia, no LVH by voltage criteria, unchanged from previous tracings  Labs Resulted Today: No results found for any visits on 09/21/20.    Recent Labs   Lab Test 03/25/19  1513 08/24/17  1240 03/11/16  1426 03/11/16  0924 09/17/14  0746   HGB 13.0 13.6  --  12.6 13.2    306  --  304 344   NA  --  137 142  --   --    POTASSIUM  --  3.9 3.9  --   --    CR  --  0.65 0.70  --   --    A1C  --   --   --  5.2 5.3        IMPRESSION:   Reason for surgery/procedure: removal of hardware from her " left foot.   Diagnosis/reason for consult: medical clearance.     The proposed surgical procedure is considered LOW risk.    REVISED CARDIAC RISK INDEX  The patient has the following serious cardiovascular risks for perioperative complications such as (MI, PE, VFib and 3  AV Block):  No serious cardiac risks  INTERPRETATION: 1 risks: Class II (low risk - 0.9% complication rate)    The patient has the following additional risks for perioperative complications:  No identified additional risks      ICD-10-CM    1. Preop general physical exam  Z01.818 EKG 12-lead complete w/read - (Clinic Performed)     CBC with platelets differential     Basic metabolic panel       RECOMMENDATIONS:         --Patient is to take all scheduled medications on the day of surgery EXCEPT for modifications listed below.    APPROVAL GIVEN to proceed with proposed procedure, without further diagnostic evaluation       Signed Electronically by: CAROLINA Goldberg    Copy of this evaluation report is provided to requesting physician.    [facility] Preop Guidelines    Revised Cardiac Risk Index

## 2020-09-17 NOTE — PATIENT INSTRUCTIONS

## 2020-09-21 ENCOUNTER — OFFICE VISIT (OUTPATIENT)
Dept: FAMILY MEDICINE | Facility: OTHER | Age: 55
End: 2020-09-21
Attending: PHYSICIAN ASSISTANT
Payer: COMMERCIAL

## 2020-09-21 VITALS
SYSTOLIC BLOOD PRESSURE: 120 MMHG | TEMPERATURE: 97.6 F | HEIGHT: 66 IN | WEIGHT: 183.4 LBS | OXYGEN SATURATION: 99 % | DIASTOLIC BLOOD PRESSURE: 74 MMHG | HEART RATE: 72 BPM | BODY MASS INDEX: 29.47 KG/M2

## 2020-09-21 DIAGNOSIS — Z01.818 PREOP GENERAL PHYSICAL EXAM: Primary | ICD-10-CM

## 2020-09-21 LAB
ANION GAP SERPL CALCULATED.3IONS-SCNC: 2 MMOL/L (ref 3–14)
BASOPHILS # BLD AUTO: 0.1 10E9/L (ref 0–0.2)
BASOPHILS NFR BLD AUTO: 1.1 %
BUN SERPL-MCNC: 16 MG/DL (ref 7–30)
CALCIUM SERPL-MCNC: 9.1 MG/DL (ref 8.5–10.1)
CHLORIDE SERPL-SCNC: 108 MMOL/L (ref 94–109)
CO2 SERPL-SCNC: 28 MMOL/L (ref 20–32)
CREAT SERPL-MCNC: 0.79 MG/DL (ref 0.52–1.04)
DIFFERENTIAL METHOD BLD: NORMAL
EOSINOPHIL # BLD AUTO: 0 10E9/L (ref 0–0.7)
EOSINOPHIL NFR BLD AUTO: 0.5 %
ERYTHROCYTE [DISTWIDTH] IN BLOOD BY AUTOMATED COUNT: 12.1 % (ref 10–15)
GFR SERPL CREATININE-BSD FRML MDRD: 83 ML/MIN/{1.73_M2}
GLUCOSE SERPL-MCNC: 96 MG/DL (ref 70–99)
HCT VFR BLD AUTO: 39.9 % (ref 35–47)
HGB BLD-MCNC: 13.3 G/DL (ref 11.7–15.7)
IMM GRANULOCYTES # BLD: 0 10E9/L (ref 0–0.4)
IMM GRANULOCYTES NFR BLD: 0.4 %
LYMPHOCYTES # BLD AUTO: 2.4 10E9/L (ref 0.8–5.3)
LYMPHOCYTES NFR BLD AUTO: 28.6 %
MCH RBC QN AUTO: 30.8 PG (ref 26.5–33)
MCHC RBC AUTO-ENTMCNC: 33.3 G/DL (ref 31.5–36.5)
MCV RBC AUTO: 92 FL (ref 78–100)
MONOCYTES # BLD AUTO: 0.8 10E9/L (ref 0–1.3)
MONOCYTES NFR BLD AUTO: 9.5 %
NEUTROPHILS # BLD AUTO: 5.1 10E9/L (ref 1.6–8.3)
NEUTROPHILS NFR BLD AUTO: 59.9 %
NRBC # BLD AUTO: 0 10*3/UL
NRBC BLD AUTO-RTO: 0 /100
PLATELET # BLD AUTO: 339 10E9/L (ref 150–450)
POTASSIUM SERPL-SCNC: 4.4 MMOL/L (ref 3.4–5.3)
RBC # BLD AUTO: 4.32 10E12/L (ref 3.8–5.2)
SODIUM SERPL-SCNC: 138 MMOL/L (ref 133–144)
WBC # BLD AUTO: 8.5 10E9/L (ref 4–11)

## 2020-09-21 PROCEDURE — 80048 BASIC METABOLIC PNL TOTAL CA: CPT | Performed by: PHYSICIAN ASSISTANT

## 2020-09-21 PROCEDURE — 36415 COLL VENOUS BLD VENIPUNCTURE: CPT | Performed by: PHYSICIAN ASSISTANT

## 2020-09-21 PROCEDURE — 85025 COMPLETE CBC W/AUTO DIFF WBC: CPT | Performed by: PHYSICIAN ASSISTANT

## 2020-09-21 PROCEDURE — 99214 OFFICE O/P EST MOD 30 MIN: CPT | Performed by: PHYSICIAN ASSISTANT

## 2020-09-21 PROCEDURE — 93000 ELECTROCARDIOGRAM COMPLETE: CPT | Performed by: INTERNAL MEDICINE

## 2020-09-21 ASSESSMENT — ANXIETY QUESTIONNAIRES
3. WORRYING TOO MUCH ABOUT DIFFERENT THINGS: NOT AT ALL
5. BEING SO RESTLESS THAT IT IS HARD TO SIT STILL: NOT AT ALL
2. NOT BEING ABLE TO STOP OR CONTROL WORRYING: NOT AT ALL
7. FEELING AFRAID AS IF SOMETHING AWFUL MIGHT HAPPEN: NOT AT ALL
GAD7 TOTAL SCORE: 0
6. BECOMING EASILY ANNOYED OR IRRITABLE: NOT AT ALL
1. FEELING NERVOUS, ANXIOUS, OR ON EDGE: NOT AT ALL
4. TROUBLE RELAXING: NOT AT ALL

## 2020-09-21 ASSESSMENT — MIFFLIN-ST. JEOR: SCORE: 1438.65

## 2020-09-21 ASSESSMENT — PATIENT HEALTH QUESTIONNAIRE - PHQ9: SUM OF ALL RESPONSES TO PHQ QUESTIONS 1-9: 0

## 2020-09-21 ASSESSMENT — PAIN SCALES - GENERAL: PAINLEVEL: NO PAIN (0)

## 2020-09-21 NOTE — NURSING NOTE
"Chief Complaint   Patient presents with     Pre-Op Exam       Initial /74 (BP Location: Left arm, Patient Position: Sitting, Cuff Size: Adult Regular)   Pulse 72   Temp 97.6  F (36.4  C) (Tympanic)   Ht 1.676 m (5' 6\")   Wt 83.2 kg (183 lb 6.4 oz)   SpO2 99%   BMI 29.60 kg/m   Estimated body mass index is 29.6 kg/m  as calculated from the following:    Height as of this encounter: 1.676 m (5' 6\").    Weight as of this encounter: 83.2 kg (183 lb 6.4 oz).  Medication Reconciliation: complete  Sandra Hernandez LPN  "

## 2020-09-22 ASSESSMENT — ANXIETY QUESTIONNAIRES: GAD7 TOTAL SCORE: 0

## 2020-09-25 ENCOUNTER — OFFICE VISIT (OUTPATIENT)
Dept: FAMILY MEDICINE | Facility: OTHER | Age: 55
End: 2020-09-25
Attending: PHYSICIAN ASSISTANT
Payer: COMMERCIAL

## 2020-09-25 DIAGNOSIS — Z41.9 ELECTIVE SURGERY: ICD-10-CM

## 2020-09-25 PROCEDURE — U0003 INFECTIOUS AGENT DETECTION BY NUCLEIC ACID (DNA OR RNA); SEVERE ACUTE RESPIRATORY SYNDROME CORONAVIRUS 2 (SARS-COV-2) (CORONAVIRUS DISEASE [COVID-19]), AMPLIFIED PROBE TECHNIQUE, MAKING USE OF HIGH THROUGHPUT TECHNOLOGIES AS DESCRIBED BY CMS-2020-01-R: HCPCS | Performed by: FAMILY MEDICINE

## 2020-09-26 ENCOUNTER — MYC MEDICAL ADVICE (OUTPATIENT)
Dept: FAMILY MEDICINE | Facility: OTHER | Age: 55
End: 2020-09-26

## 2020-09-26 LAB
SARS-COV-2 RNA SPEC QL NAA+PROBE: NOT DETECTED
SPECIMEN SOURCE: NORMAL

## 2020-09-28 ENCOUNTER — TELEPHONE (OUTPATIENT)
Dept: FAMILY MEDICINE | Facility: OTHER | Age: 55
End: 2020-09-28

## 2020-09-28 NOTE — TELEPHONE ENCOUNTER
Called and left message for patient to call back to schedule covid testing. Sandra Hernandez LPN

## 2020-09-29 ENCOUNTER — ANESTHESIA EVENT (OUTPATIENT)
Dept: SURGERY | Facility: OTHER | Age: 55
End: 2020-09-29
Payer: COMMERCIAL

## 2020-09-29 ENCOUNTER — HOSPITAL ENCOUNTER (OUTPATIENT)
Facility: OTHER | Age: 55
Discharge: HOME | End: 2020-09-29
Attending: SURGERY | Admitting: SURGERY
Payer: COMMERCIAL

## 2020-09-29 ENCOUNTER — ANESTHESIA (OUTPATIENT)
Dept: SURGERY | Facility: OTHER | Age: 55
End: 2020-09-29
Payer: COMMERCIAL

## 2020-09-29 VITALS
RESPIRATION RATE: 10 BRPM | DIASTOLIC BLOOD PRESSURE: 101 MMHG | BODY MASS INDEX: 29.41 KG/M2 | HEART RATE: 63 BPM | OXYGEN SATURATION: 98 % | TEMPERATURE: 97.5 F | SYSTOLIC BLOOD PRESSURE: 152 MMHG | HEIGHT: 66 IN | WEIGHT: 183 LBS

## 2020-09-29 DIAGNOSIS — K29.70 GASTRITIS WITHOUT BLEEDING, UNSPECIFIED CHRONICITY, UNSPECIFIED GASTRITIS TYPE: Primary | ICD-10-CM

## 2020-09-29 PROCEDURE — 43239 EGD BIOPSY SINGLE/MULTIPLE: CPT | Performed by: NURSE ANESTHETIST, CERTIFIED REGISTERED

## 2020-09-29 PROCEDURE — 88305 TISSUE EXAM BY PATHOLOGIST: CPT

## 2020-09-29 PROCEDURE — 43239 EGD BIOPSY SINGLE/MULTIPLE: CPT | Performed by: SURGERY

## 2020-09-29 PROCEDURE — 25000125 ZZHC RX 250: Performed by: NURSE ANESTHETIST, CERTIFIED REGISTERED

## 2020-09-29 PROCEDURE — 25000125 ZZHC RX 250: Performed by: SURGERY

## 2020-09-29 PROCEDURE — 40000010 ZZH STATISTIC ANES STAT CODE-CRNA PER MINUTE: Performed by: SURGERY

## 2020-09-29 PROCEDURE — 25000132 ZZH RX MED GY IP 250 OP 250 PS 637: Performed by: SURGERY

## 2020-09-29 PROCEDURE — 25000128 H RX IP 250 OP 636: Performed by: NURSE ANESTHETIST, CERTIFIED REGISTERED

## 2020-09-29 PROCEDURE — 25800030 ZZH RX IP 258 OP 636: Performed by: SURGERY

## 2020-09-29 RX ORDER — PROPOFOL 10 MG/ML
INJECTION, EMULSION INTRAVENOUS PRN
Status: DISCONTINUED | OUTPATIENT
Start: 2020-09-29 | End: 2020-09-29

## 2020-09-29 RX ORDER — SIMETHICONE
LIQUID (ML) MISCELLANEOUS PRN
Status: DISCONTINUED | OUTPATIENT
Start: 2020-09-29 | End: 2020-09-29 | Stop reason: HOSPADM

## 2020-09-29 RX ORDER — LIDOCAINE HYDROCHLORIDE 20 MG/ML
INJECTION, SOLUTION INFILTRATION; PERINEURAL PRN
Status: DISCONTINUED | OUTPATIENT
Start: 2020-09-29 | End: 2020-09-29

## 2020-09-29 RX ORDER — LIDOCAINE 40 MG/G
CREAM TOPICAL
Status: DISCONTINUED | OUTPATIENT
Start: 2020-09-29 | End: 2020-09-29 | Stop reason: HOSPADM

## 2020-09-29 RX ORDER — PROPOFOL 10 MG/ML
INJECTION, EMULSION INTRAVENOUS CONTINUOUS PRN
Status: DISCONTINUED | OUTPATIENT
Start: 2020-09-29 | End: 2020-09-29

## 2020-09-29 RX ORDER — SODIUM CHLORIDE, SODIUM LACTATE, POTASSIUM CHLORIDE, CALCIUM CHLORIDE 600; 310; 30; 20 MG/100ML; MG/100ML; MG/100ML; MG/100ML
INJECTION, SOLUTION INTRAVENOUS CONTINUOUS
Status: DISCONTINUED | OUTPATIENT
Start: 2020-09-29 | End: 2020-09-29 | Stop reason: HOSPADM

## 2020-09-29 RX ADMIN — PROPOFOL 80 MG: 10 INJECTION, EMULSION INTRAVENOUS at 10:35

## 2020-09-29 RX ADMIN — LIDOCAINE HYDROCHLORIDE 60 MG: 20 INJECTION, SOLUTION INFILTRATION; PERINEURAL at 10:35

## 2020-09-29 RX ADMIN — SODIUM CHLORIDE, POTASSIUM CHLORIDE, SODIUM LACTATE AND CALCIUM CHLORIDE 30 ML/HR: 600; 310; 30; 20 INJECTION, SOLUTION INTRAVENOUS at 10:27

## 2020-09-29 RX ADMIN — PROPOFOL 140 MCG/KG/MIN: 10 INJECTION, EMULSION INTRAVENOUS at 10:35

## 2020-09-29 ASSESSMENT — MIFFLIN-ST. JEOR: SCORE: 1436.83

## 2020-09-29 NOTE — H&P
GENERAL SURGERY CONSULTATION NOTE    Patience Mireles   [address]   [place] [address]  55 year old  female    Primary Care Provider:  Morelia Rosenthal      HPI: Patience Mireles presents to day surgery in need of EGD  for chronic epigastric pain.   Patience Mireles has had negative RUQ US, HIDA, and CT scan. Previous EGD in 2016 unremarkable.     REVIEW OF SYSTEMS:    GENERAL: No fevers or chills. Denies fatigue, recent weight loss.  HEENT: No sinus drainage. No changes with vision or hearing. No difficulty swallowing.   LYMPHATICS:  Noswollen nodes in axilla, neck or groin.  CARDIOVASCULAR: Denies chest pain, palpitations and dyspnea on exertion.  PULMONARY: No shortness of breath or cough. No increase in sputum production.  GI: Denies melena,bright red blood in stools. No hematemesis. No constipation or diarrhea.  : No dysuria or hematuria.  SKIN: No recent rashes or ulcers.   HEMATOLOGY:  No history of easy bruising or bleeding.  ENDOCRINE:  No history of diabetes or thyroid problems.  NEUROLOGY:  No history of seizures or headaches. No motor or sensory changes.        Patient Active Problem List   Diagnosis     Menopause     ACP (advance care planning)     Status post WERNER-BSO       Past Medical History:   Diagnosis Date     Asymptomatic varicose veins 7/22/2003     Carcinoma in situ of cervix uteri 9/20/2000     Chest pain, unspecified 6/5/2004     Dysmenorrhea 8/21/2000     Endometriosis of uterus 12/20/2000     Endometriosis, site unspecified 10/26/2000     Follow-up examination, following unspecified surgery 4/16/2001     Leukorrhea, not specified as infective 7/11/2007     Need for prophylactic vaccination and inoculation against viral hepatitis 1/20/2009     Nonallopathic lesion of cervical region, not elsewhere classified 2/23/2001     Other screening mammogram 8/29/2000     PONV (postoperative nausea and vomiting)      Routine general medical examination at a health care facility  12/19/2001       Past Surgical History:   Procedure Laterality Date     BUNIONECTOMY Left 4/1/2019    Procedure: LEFT FOOT SECOND DIGIT WEIL OSTEOTOMY WITH POSSIBLE PLANTAR PLATE REPAIR;  Surgeon: Greta Pacheco DPM;  [facility]     COLONOSCOPY N/A 11/6/2014    Procedure: COLONOSCOPY;  Surgeon: Quincy Fontanez MD;  Location: [facility]     ESOPHAGOSCOPY, GASTROSCOPY, DUODENOSCOPY (EGD), COMBINED  8/9/2013    Procedure: COMBINED ESOPHAGOSCOPY, GASTROSCOPY, DUODENOSCOPY (EGD);   ESOPHAGOGASTRODUODENOSCOPY WITH BIOPSIES;  Surgeon: Quincy Fontanez MD;  Location: [facility]     HYSTERECTOMY TOTAL ABDOMINAL, BILATERAL SALPINGO-OOPHORECTOMY, COMBINED N/A 01/01/2004       Family History   Problem Relation Age of Onset     Heart Disease Mother 68        MI; cause of death     C.A.D. Father      Diabetes Father      Hypertension Father      Osteoarthritis Father        Social History     Social History Narrative     Not on file       Social History     Socioeconomic History     Marital status: Single     Spouse name: Not on file     Number of children: Not on file     Years of education: Not on file     Highest education level: Not on file   Occupational History     Not on file   Social Needs     Financial resource strain: Not on file     Food insecurity     Worry: Not on file     Inability: Not on file     Transportation needs     Medical: Not on file     Non-medical: Not on file   Tobacco Use     Smoking status: Former Smoker     Types: Cigarettes     Smokeless tobacco: Never Used   Substance and Sexual Activity     Alcohol use: Yes     Alcohol/week: 0.0 standard drinks     Drug use: Not on file     Sexual activity: Not on file   Lifestyle     Physical activity     Days per week: Not on file     Minutes per session: Not on file     Stress: Not on file   Relationships     Social connections     Talks on phone: Not on file     Gets together: Not on file     Attends [employer] service: Not on file     Active member of club  "or organization: Not on file     Attends meetings of clubs or organizations: Not on file     Relationship status: Not on file     Intimate partner violence     Fear of current or ex partner: Not on file     Emotionally abused: Not on file     Physically abused: Not on file     Forced sexual activity: Not on file   Other Topics Concern      Service Not Asked     Blood Transfusions Not Asked     Caffeine Concern Yes     Occupational Exposure Not Asked     Hobby Hazards Not Asked     Sleep Concern Not Asked     Stress Concern Not Asked     Weight Concern Not Asked     Special Diet Not Asked     Back Care Not Asked     Exercise Not Asked     Bike Helmet Not Asked     Seat Belt Not Asked     Self-Exams Not Asked     Parent/sibling w/ CABG, MI or angioplasty before 65F 55M? No   Social History Narrative     Not on file       No current facility-administered medications on file prior to encounter.   loratadine (CLARITIN) 10 MG tablet, Take 10 mg by mouth as needed   estradiol (CLIMARA) 0.1 MG/24HR weekly patch, PLACE 1 PATCH ON THE SKIN ONCE A WEEK  linaclotide (LINZESS) 145 MCG capsule, Take 1 capsule (145 mcg) by mouth every morning (before breakfast)  valACYclovir (VALTREX) 1000 mg tablet, TAKE 2 TABLETS BY MOUTH 2 TIMES A DAY          ALLERGIES/SENSITIVITIES: No Known Allergies    PHYSICAL EXAM:     /83 (Cuff Size: Adult Regular)   Temp 97.5  F (36.4  C) (Tympanic)   Resp 10   Ht 1.676 m (5' 6\")   Wt 83 kg (183 lb)   SpO2 98%   Breastfeeding No   BMI 29.54 kg/m      General Appearance:   Sitting up in bed, no apparent distress  HEENT: Pupils are equal and reactive, no scleral icterus   Heart & CV:  RRR, no murmur.  LUNGS: No increased work of breathing. Lungs are CTA B/L, no wheezing or crackles.  Abd:  soft, non-tender, no masses   Ext: no lower extremity edema   Neuro: alert and oriented, normal speech and mentation         CONSULTATION ASSESSMENT AND PLAN:    55 year old female with chronic " epigastric pain in need of diagnostic EGD.      The technical details of EGDwere discussed with the patient along with the risks and benefits to include bleeding, perforation and aspiration. Patience Mireles demonstrated understanding and is willing to proceed.       Damon Ruby MD on 9/29/2020 at 10:24 AM

## 2020-09-29 NOTE — ANESTHESIA CARE TRANSFER NOTE
Patient: Patience Mireles    Procedure(s):  ESOPHAGOGASTRODUODENOSCOPY, WITH BIOPSY    Diagnosis: Epigastric pain [R10.13]  Diagnosis Additional Information: No value filed.    Anesthesia Type:   MAC     Note:  Airway :Room Air  Patient transferred to:Phase II  Handoff Report: Identifed the Patient, Identified the Reponsible Provider, Reviewed the pertinent medical history, Discussed the surgical course, Reviewed Intra-OP anesthesia mangement and issues during anesthesia, Set expectations for post-procedure period and Allowed opportunity for questions and acknowledgement of understanding      Vitals: (Last set prior to Anesthesia Care Transfer)    CRNA VITALS  9/29/2020 1016 - 9/29/2020 1046      9/29/2020             Pulse:  82    Ht Rate:  82    SpO2:  98 %    Resp Rate (set):  10                Electronically Signed By: PHOEBE Angel CRNA  September 29, 2020  10:46 AM

## 2020-09-29 NOTE — ANESTHESIA POSTPROCEDURE EVALUATION
Patient: Patience Mireles    Procedure(s):  ESOPHAGOGASTRODUODENOSCOPY, WITH BIOPSY    Diagnosis:Epigastric pain [R10.13]  Diagnosis Additional Information: No value filed.    Anesthesia Type:  MAC    Note:  Anesthesia Post Evaluation    Patient location during evaluation: Phase 2  Patient participation: Able to fully participate in evaluation  Level of consciousness: awake and alert  Pain management: adequate  Airway patency: patent  Cardiovascular status: acceptable  Respiratory status: acceptable  Hydration status: acceptable  PONV: none     Anesthetic complications: None          Last vitals:  Vitals:    09/29/20 1050 09/29/20 1053 09/29/20 1100   BP: 110/75 113/71 103/81   Pulse: 78 75 70   Resp:      Temp:      SpO2:   98%         Electronically Signed By: PHOEBE SINHA CRNA  September 29, 2020  11:22 AM

## 2020-09-29 NOTE — ANESTHESIA PREPROCEDURE EVALUATION
Anesthesia Pre-Procedure Evaluation    Patient: Patience Mireles   MRN: [id number] : 1965          Preoperative Diagnosis: Epigastric pain [R10.13]    Procedure(s):  ESOPHAGOGASTRODUODENOSCOPY, WITH BIOPSY    Past Medical History:   Diagnosis Date     Asymptomatic varicose veins 2003     Carcinoma in situ of cervix uteri 2000     Chest pain, unspecified 2004     Dysmenorrhea 2000     Endometriosis of uterus 2000     Endometriosis, site unspecified 10/26/2000     Follow-up examination, following unspecified surgery 2001     Leukorrhea, not specified as infective 2007     Need for prophylactic vaccination and inoculation against viral hepatitis 2009     Nonallopathic lesion of cervical region, not elsewhere classified 2001     Other screening mammogram 2000     PONV (postoperative nausea and vomiting)      Routine general medical examination at a [facility] care facility 2001     Past Surgical History:   Procedure Laterality Date     BUNIONECTOMY Left 2019    Procedure: LEFT FOOT SECOND DIGIT WEIL OSTEOTOMY WITH POSSIBLE PLANTAR PLATE REPAIR;  Surgeon: Greta Pacheco DPM;  [facility]     COLONOSCOPY N/A 2014    Procedure: COLONOSCOPY;  Surgeon: Quincy Fontanez MD;  [facility]     ESOPHAGOSCOPY, GASTROSCOPY, DUODENOSCOPY (EGD), COMBINED  2013    Procedure: COMBINED ESOPHAGOSCOPY, GASTROSCOPY, DUODENOSCOPY (EGD);   ESOPHAGOGASTRODUODENOSCOPY WITH BIOPSIES;  Surgeon: Quincy Fontanez MD;  Location[facility]     HYSTERECTOMY TOTAL ABDOMINAL, BILATERAL SALPINGO-OOPHORECTOMY, COMBINED N/A 2004       Anesthesia Evaluation     . Pt has had prior anesthetic.     No history of anesthetic complications          ROS/MED HX    ENT/Pulmonary:  - neg pulmonary ROS     Neurologic:  - neg neurologic ROS     Cardiovascular:  - neg cardiovascular ROS       METS/Exercise Tolerance:  >4 METS   Hematologic:  - neg hematologic  ROS      "  Musculoskeletal:  - neg musculoskeletal ROS       GI/Hepatic:     (+) GERD       Renal/Genitourinary:  - ROS Renal section negative       Endo:  - neg endo ROS       Psychiatric:  - neg psychiatric ROS       Infectious Disease:  - neg infectious disease ROS       Malignancy:      - no malignancy   Other:    - neg other ROS                      Physical Exam  Normal systems: cardiovascular, pulmonary and dental    Airway   Mallampati: II  TM distance: >3 FB    Dental     Cardiovascular   Rhythm and rate: regular and normal      Pulmonary    breath sounds clear to auscultation            Lab Results   Component Value Date    WBC 8.5 09/21/2020    HGB 13.3 09/21/2020    HCT 39.9 09/21/2020     09/21/2020    CRP <2.9 08/24/2017    SED 9 08/24/2017     09/21/2020    POTASSIUM 4.4 09/21/2020    CHLORIDE 108 09/21/2020    CO2 28 09/21/2020    BUN 16 09/21/2020    CR 0.79 09/21/2020    GLC 96 09/21/2020    RENETTA 9.1 09/21/2020    ALBUMIN 3.9 08/24/2017    PROTTOTAL 7.5 08/24/2017    ALT 27 08/24/2017    AST 19 08/24/2017    ALKPHOS 63 08/24/2017    BILITOTAL 0.4 08/24/2017    LIPASE 141 08/24/2017    AMYLASE 51 08/24/2017    TSH 1.60 03/11/2016       Preop Vitals  BP Readings from Last 3 Encounters:   09/21/20 120/74   08/20/20 122/82   06/11/19 114/74    Pulse Readings from Last 3 Encounters:   09/21/20 72   08/20/20 68   06/11/19 78      Resp Readings from Last 3 Encounters:   08/20/20 16   04/25/19 16   04/01/19 16    SpO2 Readings from Last 3 Encounters:   09/21/20 99%   05/08/19 99%   04/16/19 98%      Temp Readings from Last 1 Encounters:   09/21/20 97.6  F (36.4  C) (Tympanic)    Ht Readings from Last 1 Encounters:   09/21/20 1.676 m (5' 6\")      Wt Readings from Last 1 Encounters:   09/21/20 83.2 kg (183 lb 6.4 oz)    Estimated body mass index is 29.6 kg/m  as calculated from the following:    Height as of 9/21/20: 1.676 m (5' 6\").    Weight as of 9/21/20: 83.2 kg (183 lb 6.4 oz).       Anesthesia " Plan      History & Physical Review      ASA Status:  2 .    NPO Status:  > 6 hours    Plan for MAC with Propofol induction.            Postoperative Care      Consents  Anesthetic plan, risks, benefits and alternatives discussed with:  Patient..                 PHOEBE SINHA CRNA

## 2020-09-29 NOTE — DISCHARGE INSTRUCTIONS
[facility]  Adult Discharge Orders & Instructions    ________________________________________________________________          For 12 hours after surgery  1. Get plenty of rest.  A responsible adult must stay with you for at least 12 hours after you leave the hospital.   2. You may feel lightheaded.  IF so, sit for a few minutes before standing.  Have someone help you get up.   3. You may have a slight fever. Call the doctor if your fever is over 101 F (38.3 C) (taken under the tongue) or lasts longer than 24 hours.  4. You may have a dry mouth, a sore throat, muscle aches or trouble sleeping.  These should go away after 24 hours.  5. Do not make important or legal decisions.  6.   Do not drive or use heavy equipment.  If you have weakness or tingling, don't drive or use heavy equipment until this feeling goes away.    To contact a doctor, call   [phone]_______________________

## 2020-09-29 NOTE — OP NOTE
PROCEDURE NOTE    DATE OF SERVICE: 9/29/2020    SURGEON: SIDRA Ruby MD     PRE-OP DIAGNOSIS:  epicastric or chest pain    POST-OP DIAGNOSIS:  Gastritis     PROCEDURE:   EGD with biopsy    ASSISTANT:  Zayraulator: Jacey Wilks RN  Scrub Person: Aleks German  2nd Scrub: Kimi Banks  Pre-Op Nurse: Betty Castro RN  Post-Op Nurse: Bre Schuster RN    ANESTHESIA:  MAC                            Monitor Anesthesia CareCRNA Independent: Kp Drew APRN CRNA    INDICATION FOR THE PROCEDURE: Patience Mireles is a 55 year old female. The patient presents with chronic epigastric pain. I explained to the patient the risks, benefits and alternatives to diagnostic EGD. We specifically discussed the risks of bleeding, infection,perforation and the risks of sedation. The patient's questions were answered and the patient wished to proceed. Informed consent paperwork was completed.    PROCEDURE:The patient was taken to the endoscopy suite. Appropriate monitors were attached. The patient was placed in the left lateral decubitus position. Bite block was positioned.Timeout was performed confirming the patient's identity and procedure to be performed. After appropriate sedation was confirmed, the flexible endoscope was advanced into the oropharynx. The posterior oropharynx appeared grossly normal. The scope was advanced into the proximal esophagus. The esophagus was insufflated with air. The scope was advanced under direct visualization. No acute abnormalities of the esophagus were noted. The scope was advanced into the stomach. The scope was advanced through the pylorus into the duodenal bulb. The bulb and distal duodenum appeared grossly normal.  The scope was withdrawn back into the stomach. Antral biopsy was obtained and sent to pathology. Antrum appeared mildly inflamed. No ulcers seen. The scope was retroflexed and the GE junction inspected. No abnormalities were noted.  The scope was returned to a  neutral position and the stomach was decompressed. The scope was withdrawn to the GE junction and biopsy obtained. The mucosa of the esophagus was inspected while withdrawing the scope. No abnormalities were noted. The scope was withdrawn from the patient. The bite block was removed. The patient tolerated the procedure with no immediately apparent complication. The patient was taken to recovery instable condition.     ESTIMATED BLOOD LOSS: none    COMPLICATIONS:  None    TISSUE REMOVED:  Yes    RECOMMEND:    Follow up biopsies       SIDRA Ruby MD

## 2020-10-05 ENCOUNTER — TELEPHONE (OUTPATIENT)
Dept: SURGERY | Facility: OTHER | Age: 55
End: 2020-10-05

## 2020-10-05 DIAGNOSIS — K31.9 GASTROPATHY: Primary | ICD-10-CM

## 2020-10-05 RX ORDER — URSODIOL 300 MG/1
300 CAPSULE ORAL 2 TIMES DAILY
Qty: 60 CAPSULE | Refills: 0 | Status: SHIPPED | OUTPATIENT
Start: 2020-10-05 | End: 2020-11-04

## 2020-10-12 ENCOUNTER — ANESTHESIA EVENT (OUTPATIENT)
Dept: SURGERY | Facility: HOSPITAL | Age: 55
End: 2020-10-12

## 2020-10-12 RX ORDER — ONDANSETRON 4 MG/1
4 TABLET, ORALLY DISINTEGRATING ORAL EVERY 30 MIN PRN
Status: CANCELLED | OUTPATIENT
Start: 2020-10-12

## 2020-10-12 RX ORDER — HYDROMORPHONE HYDROCHLORIDE 1 MG/ML
.3-.5 INJECTION, SOLUTION INTRAMUSCULAR; INTRAVENOUS; SUBCUTANEOUS EVERY 10 MIN PRN
Status: CANCELLED | OUTPATIENT
Start: 2020-10-12

## 2020-10-12 RX ORDER — MEPERIDINE HYDROCHLORIDE 25 MG/ML
12.5 INJECTION INTRAMUSCULAR; INTRAVENOUS; SUBCUTANEOUS
Status: CANCELLED | OUTPATIENT
Start: 2020-10-12

## 2020-10-12 RX ORDER — LIDOCAINE 40 MG/G
CREAM TOPICAL
Status: CANCELLED | OUTPATIENT
Start: 2020-10-12

## 2020-10-12 RX ORDER — HYDRALAZINE HYDROCHLORIDE 20 MG/ML
2.5-5 INJECTION INTRAMUSCULAR; INTRAVENOUS EVERY 10 MIN PRN
Status: CANCELLED | OUTPATIENT
Start: 2020-10-12

## 2020-10-12 RX ORDER — ONDANSETRON 2 MG/ML
4 INJECTION INTRAMUSCULAR; INTRAVENOUS EVERY 30 MIN PRN
Status: CANCELLED | OUTPATIENT
Start: 2020-10-12

## 2020-10-12 RX ORDER — FENTANYL CITRATE 50 UG/ML
25-50 INJECTION, SOLUTION INTRAMUSCULAR; INTRAVENOUS EVERY 5 MIN PRN
Status: CANCELLED | OUTPATIENT
Start: 2020-10-12

## 2020-10-12 RX ORDER — SODIUM CHLORIDE, SODIUM LACTATE, POTASSIUM CHLORIDE, CALCIUM CHLORIDE 600; 310; 30; 20 MG/100ML; MG/100ML; MG/100ML; MG/100ML
INJECTION, SOLUTION INTRAVENOUS CONTINUOUS
Status: CANCELLED | OUTPATIENT
Start: 2020-10-12

## 2020-10-12 RX ORDER — NALOXONE HYDROCHLORIDE 0.4 MG/ML
.1-.4 INJECTION, SOLUTION INTRAMUSCULAR; INTRAVENOUS; SUBCUTANEOUS
Status: CANCELLED | OUTPATIENT
Start: 2020-10-12 | End: 2020-10-13

## 2020-10-12 RX ORDER — FENTANYL CITRATE 50 UG/ML
25-50 INJECTION, SOLUTION INTRAMUSCULAR; INTRAVENOUS
Status: CANCELLED | OUTPATIENT
Start: 2020-10-12

## 2020-10-12 RX ORDER — LABETALOL 20 MG/4 ML (5 MG/ML) INTRAVENOUS SYRINGE
10
Status: CANCELLED | OUTPATIENT
Start: 2020-10-12

## 2020-10-12 ASSESSMENT — LIFESTYLE VARIABLES: TOBACCO_USE: 1

## 2020-10-19 ENCOUNTER — ANESTHESIA (OUTPATIENT)
Dept: SURGERY | Facility: HOSPITAL | Age: 55
End: 2020-10-19

## 2021-01-18 DIAGNOSIS — Z78.0 MENOPAUSE: ICD-10-CM

## 2021-01-18 RX ORDER — ESTRADIOL 0.1 MG/D
PATCH TRANSDERMAL
Qty: 12 PATCH | Refills: 0 | Status: SHIPPED | OUTPATIENT
Start: 2021-01-18 | End: 2021-06-16

## 2021-01-18 NOTE — TELEPHONE ENCOUNTER
Inder      Last Written Prescription Date:  8.6.20  Last Fill Quantity: 12 patch,   # refills: 0  Last Office Visit: 09.25.2020

## 2021-03-19 ENCOUNTER — RESULTS ONLY (OUTPATIENT)
Dept: LAB | Age: 56
End: 2021-03-19

## 2021-03-19 LAB
LABORATORY COMMENT REPORT: NORMAL
SARS-COV-2 RNA RESP QL NAA+PROBE: NEGATIVE
SPECIMEN SOURCE: NORMAL

## 2021-04-29 NOTE — H&P (VIEW-ONLY)
[facility] [place]  [address]  [facility] [address]  Phone: [phone]  Primary Provider: Morelia Hoffman  Pre-op Performing Provider: MORELIA HOFFMAN      PREOPERATIVE EVALUATION:  Today's date: 5/3/2021    Patience Mireles is a 55 year old female who presents for a preoperative evaluation.    Surgical Information:  Surgery/Procedure:  2nd left meatarsal head resection . Hardware removal   Surgery Location: [facility] [place]   Surgeon: Reinking   Surgery Date: 5/17/21  Time of Surgery: tbd   Where patient plans to recover: At home alone  Fax number for surgical facility: Note does not need to be faxed, will be available electronically in Epic.    Type of Anesthesia Anticipated: General    Assessment & Plan     The proposed surgical procedure is considered LOW risk.    1. Preop general physical exam  She is optimized.            Risks and Recommendations:  The patient has the following additional risks and recommendations for perioperative complications:   - No identified additional risk factors other than previously addressed    Medication Instructions:  Patient is on no chronic medications    RECOMMENDATION:  APPROVAL GIVEN to proceed with proposed procedure, without further diagnostic evaluation.    Review of external notes as documented above           5  minutes spent on the date of the encounter doing chart review and previous preop for this procedure.          Subjective     HPI related to upcoming procedure: left foot harware removal and tendon repair.     Preop Questions 4/26/2021   1. Have you ever had a heart attack or stroke? No   2. Have you ever had surgery on your heart or blood vessels, such as a stent placement, a coronary artery bypass, or surgery on an artery in your head, neck, heart, or legs? No   3. Do you have chest pain with activity? No   4. Do you have a history of  heart failure? No   5. Do you currently have a cold, bronchitis or symptoms of other infection? No   6. Do you  have a cough, shortness of breath, or wheezing? No   7. Do you or anyone in your family have previous history of blood clots? No   8. Do you or does anyone in your family have a serious bleeding problem such as prolonged bleeding following surgeries or cuts? No   9. Have you ever had problems with anemia or been told to take iron pills? No   10. Have you had any abnormal blood loss such as black, tarry or bloody stools, or abnormal vaginal bleeding? No   11. Have you ever had a blood transfusion? No   12. Are you willing to have a blood transfusion if it is medically needed before, during, or after your surgery? Yes   13. Have you or any of your relatives ever had problems with anesthesia? No   14. Do you have sleep apnea, excessive snoring or daytime drowsiness? No   15. Do you have any artifical heart valves or other implanted medical devices like a pacemaker, defibrillator, or continuous glucose monitor? No   16. Do you have artificial joints? No   17. Are you allergic to latex? No   18. Is there any chance that you may be pregnant? No       Health Care Directive:  Patient does not have a Health Care Directive or Living Will: Discussed advance care planning with patient; however, patient declined at this time.    Preoperative Review of :   reviewed - no record of controlled substances prescribed.      Status of Chronic Conditions:  See problem list for active medical problems.  Problems all longstanding and stable, except as noted/documented.  See ROS for pertinent symptoms related to these conditions.      Review of Systems  CONSTITUTIONAL: NEGATIVE for fever, chills, change in weight  INTEGUMENTARY/SKIN: NEGATIVE for worrisome rashes, moles or lesions  EYES: NEGATIVE for vision changes or irritation  ENT/MOUTH: NEGATIVE for ear, mouth and throat problems  RESP: NEGATIVE for significant cough or SOB  CV: NEGATIVE for chest pain, palpitations or peripheral edema  GI: NEGATIVE for nausea, abdominal pain,  heartburn, or change in bowel habits  : NEGATIVE for frequency, dysuria, or hematuria  MUSCULOSKELETAL:toe is fused and doesn't move very sore .  Left foot 2nd toe.   NEURO: NEGATIVE for weakness, dizziness or paresthesias  ENDOCRINE: NEGATIVE for temperature intolerance, skin/hair changes  HEME: NEGATIVE for bleeding problems  PSYCHIATRIC: NEGATIVE for changes in mood or affect    Patient Active Problem List    Diagnosis Date Noted     Status post WERNER-BSO 08/01/2017     Priority: Medium     ACP (advance care planning) 03/11/2016     Priority: Medium     Advance Care Planning 3/11/2016: ACP Review of Chart / Resources Provided:  Reviewed chart for advance care plan.  Patience Mireles has been provided information and resources to begin or update their advance care plan.  Added by Esther Sanchez             Menopause 07/31/2013     Priority: Medium     Iatrogenic menopause 2004        Past Medical History:   Diagnosis Date     Asymptomatic varicose veins 7/22/2003     Carcinoma in situ of cervix uteri 9/20/2000     Chest pain, unspecified 6/5/2004     Dysmenorrhea 8/21/2000     Endometriosis of uterus 12/20/2000     Endometriosis, site unspecified 10/26/2000     Follow-up examination, following unspecified surgery 4/16/2001     Leukorrhea, not specified as infective 7/11/2007     Need for prophylactic vaccination and inoculation against viral hepatitis 1/20/2009     Nonallopathic lesion of cervical region, not elsewhere classified 2/23/2001     Other screening mammogram 8/29/2000     PONV (postoperative nausea and vomiting)      Routine general medical examination at a health care facility 12/19/2001     Past Surgical History:   Procedure Laterality Date     BUNIONECTOMY Left 4/1/2019    Procedure: LEFT FOOT SECOND DIGIT WEIL OSTEOTOMY WITH POSSIBLE PLANTAR PLATE REPAIR;  Surgeon: Greta Pacheco DPM;  Location: [facility]     COLONOSCOPY N/A 11/6/2014    Procedure: COLONOSCOPY;  Surgeon: Quincy Fontanez,  "MD;  Location: [facility]     ESOPHAGOSCOPY, GASTROSCOPY, DUODENOSCOPY (EGD), COMBINED  8/9/2013    Procedure: COMBINED ESOPHAGOSCOPY, GASTROSCOPY, DUODENOSCOPY (EGD);   ESOPHAGOGASTRODUODENOSCOPY WITH BIOPSIES;  Surgeon: Quincy Fontanez MD;  Location: [facility]     ESOPHAGOSCOPY, GASTROSCOPY, DUODENOSCOPY (EGD), COMBINED N/A 9/29/2020    Procedure: ESOPHAGOGASTRODUODENOSCOPY, WITH BIOPSY;  Surgeon: Damon Ruby MD;  Location: [facility]     HYSTERECTOMY TOTAL ABDOMINAL, BILATERAL SALPINGO-OOPHORECTOMY, COMBINED N/A 01/01/2004     Current Outpatient Medications   Medication Sig Dispense Refill     estradiol (CLIMARA) 0.1 MG/24HR weekly patch PLACE 1 PATCH ON THE SKIN ONCE A WEEK 12 patch 0     linaclotide (LINZESS) 145 MCG capsule Take 1 capsule (145 mcg) by mouth every morning (before breakfast) 30 capsule 3     loratadine (CLARITIN) 10 MG tablet Take 10 mg by mouth as needed        valACYclovir (VALTREX) 1000 mg tablet TAKE 2 TABLETS BY MOUTH 2 TIMES A DAY 8 tablet PRN       No Known Allergies     Social History     Tobacco Use     Smoking status: Former Smoker     Types: Cigarettes     Smokeless tobacco: Never Used   Substance Use Topics     Alcohol use: Yes     Alcohol/week: 0.0 standard drinks     Family History   Problem Relation Age of Onset     Heart Disease Mother 68        MI; cause of death     C.A.D. Father      Diabetes Father      Hypertension Father      Osteoarthritis Father      History   Drug Use Not on file         Objective     /78   Pulse 84   Temp 96.4  F (35.8  C) (Tympanic)   Ht 1.676 m (5' 6\")   Wt 84.4 kg (186 lb)   SpO2 98%   BMI 30.02 kg/m      Physical Exam  GENERAL APPEARANCE: healthy, alert and no distress  HENT: ear canals and TM's normal and nose and mouth without ulcers or lesions  RESP: lungs clear to auscultation - no rales, rhonchi or wheezes  CV: regular rate and rhythm, normal S1 S2, no S3 or S4 and no murmur, click or rub   ABDOMEN: soft, nontender, no HSM or masses " and bowel sounds normal  MS: see hpi   NEURO: Normal strength and tone, sensory exam grossly normal, mentation intact and speech normal    Recent Labs   Lab Test 09/21/20  1551   HGB 13.3         POTASSIUM 4.4   CR 0.79        Diagnostics:  EKG from September is done. Shows NSR Labs are pending.        Revised Cardiac Risk Index (RCRI):  The patient has the following serious cardiovascular risks for perioperative complications:   - No serious cardiac risks = 0 points     RCRI Interpretation: 0 points: Class I (very low risk - 0.4% complication rate)           Signed Electronically by: CAROLINA Goldberg  Copy of this evaluation report is provided to requesting physician.

## 2021-04-29 NOTE — PATIENT INSTRUCTIONS

## 2021-04-29 NOTE — PROGRESS NOTES
[facility] [place]  [address]  [facility] [address]  Phone: [phone]  Primary Provider: Morelia Hoffman  Pre-op Performing Provider: MORELIA HOFFMAN      PREOPERATIVE EVALUATION:  Today's date: 5/3/2021    Patience Mireles is a 55 year old female who presents for a preoperative evaluation.    Surgical Information:  Surgery/Procedure:  2nd left meatarsal head resection . Hardware removal   Surgery Location: [facility] [place]   Surgeon: Reinking   Surgery Date: 5/17/21  Time of Surgery: tbd   Where patient plans to recover: At home alone  Fax number for surgical facility: Note does not need to be faxed, will be available electronically in Epic.    Type of Anesthesia Anticipated: General    Assessment & Plan     The proposed surgical procedure is considered LOW risk.    1. Preop general physical exam  She is optimized.            Risks and Recommendations:  The patient has the following additional risks and recommendations for perioperative complications:   - No identified additional risk factors other than previously addressed    Medication Instructions:  Patient is on no chronic medications    RECOMMENDATION:  APPROVAL GIVEN to proceed with proposed procedure, without further diagnostic evaluation.    Review of external notes as documented above           5  minutes spent on the date of the encounter doing chart review and previous preop for this procedure.          Subjective     HPI related to upcoming procedure: left foot harware removal and tendon repair.     Preop Questions 4/26/2021   1. Have you ever had a heart attack or stroke? No   2. Have you ever had surgery on your heart or blood vessels, such as a stent placement, a coronary artery bypass, or surgery on an artery in your head, neck, heart, or legs? No   3. Do you have chest pain with activity? No   4. Do you have a history of  heart failure? No   5. Do you currently have a cold, bronchitis or symptoms of other infection? No   6. Do you  have a cough, shortness of breath, or wheezing? No   7. Do you or anyone in your family have previous history of blood clots? No   8. Do you or does anyone in your family have a serious bleeding problem such as prolonged bleeding following surgeries or cuts? No   9. Have you ever had problems with anemia or been told to take iron pills? No   10. Have you had any abnormal blood loss such as black, tarry or bloody stools, or abnormal vaginal bleeding? No   11. Have you ever had a blood transfusion? No   12. Are you willing to have a blood transfusion if it is medically needed before, during, or after your surgery? Yes   13. Have you or any of your relatives ever had problems with anesthesia? No   14. Do you have sleep apnea, excessive snoring or daytime drowsiness? No   15. Do you have any artifical heart valves or other implanted medical devices like a pacemaker, defibrillator, or continuous glucose monitor? No   16. Do you have artificial joints? No   17. Are you allergic to latex? No   18. Is there any chance that you may be pregnant? No       Health Care Directive:  Patient does not have a Health Care Directive or Living Will: Discussed advance care planning with patient; however, patient declined at this time.    Preoperative Review of :   reviewed - no record of controlled substances prescribed.      Status of Chronic Conditions:  See problem list for active medical problems.  Problems all longstanding and stable, except as noted/documented.  See ROS for pertinent symptoms related to these conditions.      Review of Systems  CONSTITUTIONAL: NEGATIVE for fever, chills, change in weight  INTEGUMENTARY/SKIN: NEGATIVE for worrisome rashes, moles or lesions  EYES: NEGATIVE for vision changes or irritation  ENT/MOUTH: NEGATIVE for ear, mouth and throat problems  RESP: NEGATIVE for significant cough or SOB  CV: NEGATIVE for chest pain, palpitations or peripheral edema  GI: NEGATIVE for nausea, abdominal pain,  heartburn, or change in bowel habits  : NEGATIVE for frequency, dysuria, or hematuria  MUSCULOSKELETAL:toe is fused and doesn't move very sore .  Left foot 2nd toe.   NEURO: NEGATIVE for weakness, dizziness or paresthesias  ENDOCRINE: NEGATIVE for temperature intolerance, skin/hair changes  HEME: NEGATIVE for bleeding problems  PSYCHIATRIC: NEGATIVE for changes in mood or affect    Patient Active Problem List    Diagnosis Date Noted     Status post WERNER-BSO 08/01/2017     Priority: Medium     ACP (advance care planning) 03/11/2016     Priority: Medium     Advance Care Planning 3/11/2016: ACP Review of Chart / Resources Provided:  Reviewed chart for advance care plan.  Patience Mireles has been provided information and resources to begin or update their advance care plan.  Added by Esther Sanchez             Menopause 07/31/2013     Priority: Medium     Iatrogenic menopause 2004        Past Medical History:   Diagnosis Date     Asymptomatic varicose veins 7/22/2003     Carcinoma in situ of cervix uteri 9/20/2000     Chest pain, unspecified 6/5/2004     Dysmenorrhea 8/21/2000     Endometriosis of uterus 12/20/2000     Endometriosis, site unspecified 10/26/2000     Follow-up examination, following unspecified surgery 4/16/2001     Leukorrhea, not specified as infective 7/11/2007     Need for prophylactic vaccination and inoculation against viral hepatitis 1/20/2009     Nonallopathic lesion of cervical region, not elsewhere classified 2/23/2001     Other screening mammogram 8/29/2000     PONV (postoperative nausea and vomiting)      Routine general medical examination at a health care facility 12/19/2001     Past Surgical History:   Procedure Laterality Date     BUNIONECTOMY Left 4/1/2019    Procedure: LEFT FOOT SECOND DIGIT WEIL OSTEOTOMY WITH POSSIBLE PLANTAR PLATE REPAIR;  Surgeon: Greta Pacheco DPM;  Location: [facility]     COLONOSCOPY N/A 11/6/2014    Procedure: COLONOSCOPY;  Surgeon: Quincy Fontanez,  "MD;  Location: [facility]     ESOPHAGOSCOPY, GASTROSCOPY, DUODENOSCOPY (EGD), COMBINED  8/9/2013    Procedure: COMBINED ESOPHAGOSCOPY, GASTROSCOPY, DUODENOSCOPY (EGD);   ESOPHAGOGASTRODUODENOSCOPY WITH BIOPSIES;  Surgeon: Quincy Fontanez MD;  Location: [facility]     ESOPHAGOSCOPY, GASTROSCOPY, DUODENOSCOPY (EGD), COMBINED N/A 9/29/2020    Procedure: ESOPHAGOGASTRODUODENOSCOPY, WITH BIOPSY;  Surgeon: Damon Ruby MD;  Location: [facility]     HYSTERECTOMY TOTAL ABDOMINAL, BILATERAL SALPINGO-OOPHORECTOMY, COMBINED N/A 01/01/2004     Current Outpatient Medications   Medication Sig Dispense Refill     estradiol (CLIMARA) 0.1 MG/24HR weekly patch PLACE 1 PATCH ON THE SKIN ONCE A WEEK 12 patch 0     linaclotide (LINZESS) 145 MCG capsule Take 1 capsule (145 mcg) by mouth every morning (before breakfast) 30 capsule 3     loratadine (CLARITIN) 10 MG tablet Take 10 mg by mouth as needed        valACYclovir (VALTREX) 1000 mg tablet TAKE 2 TABLETS BY MOUTH 2 TIMES A DAY 8 tablet PRN       No Known Allergies     Social History     Tobacco Use     Smoking status: Former Smoker     Types: Cigarettes     Smokeless tobacco: Never Used   Substance Use Topics     Alcohol use: Yes     Alcohol/week: 0.0 standard drinks     Family History   Problem Relation Age of Onset     Heart Disease Mother 68        MI; cause of death     C.A.D. Father      Diabetes Father      Hypertension Father      Osteoarthritis Father      History   Drug Use Not on file         Objective     /78   Pulse 84   Temp 96.4  F (35.8  C) (Tympanic)   Ht 1.676 m (5' 6\")   Wt 84.4 kg (186 lb)   SpO2 98%   BMI 30.02 kg/m      Physical Exam  GENERAL APPEARANCE: healthy, alert and no distress  HENT: ear canals and TM's normal and nose and mouth without ulcers or lesions  RESP: lungs clear to auscultation - no rales, rhonchi or wheezes  CV: regular rate and rhythm, normal S1 S2, no S3 or S4 and no murmur, click or rub   ABDOMEN: soft, nontender, no HSM or masses " and bowel sounds normal  MS: see hpi   NEURO: Normal strength and tone, sensory exam grossly normal, mentation intact and speech normal    Recent Labs   Lab Test 09/21/20  1551   HGB 13.3         POTASSIUM 4.4   CR 0.79        Diagnostics:  EKG from September is done. Shows NSR Labs are pending.        Revised Cardiac Risk Index (RCRI):  The patient has the following serious cardiovascular risks for perioperative complications:   - No serious cardiac risks = 0 points     RCRI Interpretation: 0 points: Class I (very low risk - 0.4% complication rate)           Signed Electronically by: CAROLINA Goldberg  Copy of this evaluation report is provided to requesting physician.

## 2021-05-03 ENCOUNTER — OFFICE VISIT (OUTPATIENT)
Dept: FAMILY MEDICINE | Facility: OTHER | Age: 56
End: 2021-05-03
Attending: PHYSICIAN ASSISTANT
Payer: COMMERCIAL

## 2021-05-03 VITALS
HEART RATE: 84 BPM | WEIGHT: 186 LBS | HEIGHT: 66 IN | SYSTOLIC BLOOD PRESSURE: 124 MMHG | DIASTOLIC BLOOD PRESSURE: 78 MMHG | BODY MASS INDEX: 29.89 KG/M2 | TEMPERATURE: 96.4 F | OXYGEN SATURATION: 98 %

## 2021-05-03 DIAGNOSIS — Z01.818 PREOP GENERAL PHYSICAL EXAM: Primary | ICD-10-CM

## 2021-05-03 LAB
ANION GAP SERPL CALCULATED.3IONS-SCNC: 8 MMOL/L (ref 3–14)
BASOPHILS # BLD AUTO: 0.1 10E9/L (ref 0–0.2)
BASOPHILS NFR BLD AUTO: 1.3 %
BUN SERPL-MCNC: 17 MG/DL (ref 7–30)
CALCIUM SERPL-MCNC: 8.4 MG/DL (ref 8.5–10.1)
CHLORIDE SERPL-SCNC: 107 MMOL/L (ref 94–109)
CO2 SERPL-SCNC: 24 MMOL/L (ref 20–32)
CREAT SERPL-MCNC: 0.69 MG/DL (ref 0.52–1.04)
DIFFERENTIAL METHOD BLD: NORMAL
EOSINOPHIL # BLD AUTO: 0 10E9/L (ref 0–0.7)
EOSINOPHIL NFR BLD AUTO: 0.4 %
ERYTHROCYTE [DISTWIDTH] IN BLOOD BY AUTOMATED COUNT: 12.7 % (ref 10–15)
GFR SERPL CREATININE-BSD FRML MDRD: >90 ML/MIN/{1.73_M2}
GLUCOSE SERPL-MCNC: 87 MG/DL (ref 70–99)
HCT VFR BLD AUTO: 40.2 % (ref 35–47)
HGB BLD-MCNC: 13.4 G/DL (ref 11.7–15.7)
IMM GRANULOCYTES # BLD: 0 10E9/L (ref 0–0.4)
IMM GRANULOCYTES NFR BLD: 0.1 %
LYMPHOCYTES # BLD AUTO: 2.1 10E9/L (ref 0.8–5.3)
LYMPHOCYTES NFR BLD AUTO: 30.6 %
MCH RBC QN AUTO: 30.9 PG (ref 26.5–33)
MCHC RBC AUTO-ENTMCNC: 33.3 G/DL (ref 31.5–36.5)
MCV RBC AUTO: 93 FL (ref 78–100)
MONOCYTES # BLD AUTO: 0.7 10E9/L (ref 0–1.3)
MONOCYTES NFR BLD AUTO: 10.6 %
NEUTROPHILS # BLD AUTO: 4 10E9/L (ref 1.6–8.3)
NEUTROPHILS NFR BLD AUTO: 57 %
NRBC # BLD AUTO: 0 10*3/UL
NRBC BLD AUTO-RTO: 0 /100
PLATELET # BLD AUTO: 312 10E9/L (ref 150–450)
POTASSIUM SERPL-SCNC: 3.8 MMOL/L (ref 3.4–5.3)
RBC # BLD AUTO: 4.34 10E12/L (ref 3.8–5.2)
SODIUM SERPL-SCNC: 139 MMOL/L (ref 133–144)
WBC # BLD AUTO: 7 10E9/L (ref 4–11)

## 2021-05-03 PROCEDURE — 85025 COMPLETE CBC W/AUTO DIFF WBC: CPT | Performed by: PHYSICIAN ASSISTANT

## 2021-05-03 PROCEDURE — 99214 OFFICE O/P EST MOD 30 MIN: CPT | Performed by: PHYSICIAN ASSISTANT

## 2021-05-03 PROCEDURE — 36415 COLL VENOUS BLD VENIPUNCTURE: CPT | Performed by: PHYSICIAN ASSISTANT

## 2021-05-03 PROCEDURE — 80048 BASIC METABOLIC PNL TOTAL CA: CPT | Performed by: PHYSICIAN ASSISTANT

## 2021-05-03 ASSESSMENT — MIFFLIN-ST. JEOR: SCORE: 1450.44

## 2021-05-03 ASSESSMENT — PAIN SCALES - GENERAL: PAINLEVEL: NO PAIN (0)

## 2021-05-07 ENCOUNTER — IMMUNIZATION (OUTPATIENT)
Dept: FAMILY MEDICINE | Facility: OTHER | Age: 56
End: 2021-05-07
Attending: FAMILY MEDICINE
Payer: COMMERCIAL

## 2021-05-07 PROCEDURE — 0001A PR COVID VAC PFIZER DIL RECON 30 MCG/0.3 ML IM: CPT

## 2021-05-07 PROCEDURE — 91300 PR COVID VAC PFIZER DIL RECON 30 MCG/0.3 ML IM: CPT

## 2021-05-10 ENCOUNTER — MYC MEDICAL ADVICE (OUTPATIENT)
Dept: FAMILY MEDICINE | Facility: OTHER | Age: 56
End: 2021-05-10

## 2021-05-10 ENCOUNTER — ANESTHESIA EVENT (OUTPATIENT)
Dept: SURGERY | Facility: HOSPITAL | Age: 56
End: 2021-05-10
Payer: COMMERCIAL

## 2021-05-10 DIAGNOSIS — I77.89 ENLARGED AORTA (H): Primary | ICD-10-CM

## 2021-05-10 ASSESSMENT — LIFESTYLE VARIABLES: TOBACCO_USE: 1

## 2021-05-10 NOTE — ANESTHESIA PREPROCEDURE EVALUATION
Anesthesia Pre-Procedure Evaluation    Patient: Patience Mireles   MRN: [id number] : 1965        Preoperative Diagnosis: Retained orthopedic hardware [Z96.9]  Arthrosis [M19.90]   Procedure : Procedure(s):  Left 2nd Metatarsal Head Resection, Hardware Removal     Past Medical History:   Diagnosis Date     Asymptomatic varicose veins 2003     Carcinoma in situ of cervix uteri 2000     Chest pain, unspecified 2004     Dysmenorrhea 2000     Endometriosis of uterus 2000     Endometriosis, site unspecified 10/26/2000     Follow-up examination, following unspecified surgery 2001     Leukorrhea, not specified as infective 2007     Need for prophylactic vaccination and inoculation against viral hepatitis 2009     Nonallopathic lesion of cervical region, not elsewhere classified 2001     Other screening mammogram 2000     PONV (postoperative nausea and vomiting)      Routine general medical examination at a health care facility 2001      Past Surgical History:   Procedure Laterality Date     BUNIONECTOMY Left 2019    Procedure: LEFT FOOT SECOND DIGIT WEIL OSTEOTOMY WITH POSSIBLE PLANTAR PLATE REPAIR;  Surgeon: Greta Pacheco DPM;  Location: [facility]     COLONOSCOPY N/A 2014    Procedure: COLONOSCOPY;  Surgeon: Quincy Fontanez MD;  Location: [facility]     ESOPHAGOSCOPY, GASTROSCOPY, DUODENOSCOPY (EGD), COMBINED  2013    Procedure: COMBINED ESOPHAGOSCOPY, GASTROSCOPY, DUODENOSCOPY (EGD);   ESOPHAGOGASTRODUODENOSCOPY WITH BIOPSIES;  Surgeon: Quincy Fontanez MD;  Location: [facility]     ESOPHAGOSCOPY, GASTROSCOPY, DUODENOSCOPY (EGD), COMBINED N/A 2020    Procedure: ESOPHAGOGASTRODUODENOSCOPY, WITH BIOPSY;  Surgeon: Damon Ruby MD;  Location:  OR     HYSTERECTOMY TOTAL ABDOMINAL, BILATERAL SALPINGO-OOPHORECTOMY, COMBINED N/A 2004      No Known Allergies   Social History     Tobacco Use     Smoking status: Former Smoker      Types: Cigarettes     Smokeless tobacco: Never Used   Substance Use Topics     Alcohol use: Yes     Alcohol/week: 0.0 standard drinks      Wt Readings from Last 1 Encounters:   05/03/21 84.4 kg (186 lb)        Anesthesia Evaluation   Pt has had prior anesthetic. Type: General and MAC.    History of anesthetic complications  - PONV.      ROS/MED HX  ENT/Pulmonary:     (+) allergic rhinitis, tobacco use, Past use,     Neurologic:  - neg neurologic ROS     Cardiovascular: Comment: Pt stated ascending aorta is slightly dilated and will follow up with [facility]    (+) -----Previous cardiac testing   Echo: Date: Results:    Stress Test: Date: Results:    ECG Reviewed: Date: 9/2020 Results:  NSR  Cath: Date: Results:      METS/Exercise Tolerance:     Hematologic:  - neg hematologic  ROS     Musculoskeletal:  - neg musculoskeletal ROS     GI/Hepatic:  - neg GI/hepatic ROS     Renal/Genitourinary:  - neg Renal ROS     Endo:     (+) Obesity,     Psychiatric/Substance Use:  - neg psychiatric ROS     Infectious Disease:  - neg infectious disease ROS     Malignancy:   (+) Malignancy, History of Other.Other CA Cervical status post.    Other:  - neg other ROS          Physical Exam    Airway        Mallampati: I   TM distance: > 3 FB   Neck ROM: full   Mouth opening: > 3 cm    Respiratory Devices and Support         Dental  no notable dental history         Cardiovascular   cardiovascular exam normal       Rhythm and rate: regular and normal     Pulmonary   pulmonary exam normal        breath sounds clear to auscultation           OUTSIDE LABS:  CBC:   Lab Results   Component Value Date    WBC 7.0 05/03/2021    WBC 8.5 09/21/2020    HGB 13.4 05/03/2021    HGB 13.3 09/21/2020    HCT 40.2 05/03/2021    HCT 39.9 09/21/2020     05/03/2021     09/21/2020     BMP:   Lab Results   Component Value Date     05/03/2021     09/21/2020    POTASSIUM 3.8 05/03/2021    POTASSIUM 4.4 09/21/2020     CHLORIDE 107 05/03/2021    CHLORIDE 108 09/21/2020    CO2 24 05/03/2021    CO2 28 09/21/2020    BUN 17 05/03/2021    BUN 16 09/21/2020    CR 0.69 05/03/2021    CR 0.79 09/21/2020    GLC 87 05/03/2021    GLC 96 09/21/2020     COAGS: No results found for: PTT, INR, FIBR  POC: No results found for: BGM, HCG, HCGS  HEPATIC:   Lab Results   Component Value Date    ALBUMIN 3.9 08/24/2017    PROTTOTAL 7.5 08/24/2017    ALT 27 08/24/2017    AST 19 08/24/2017    ALKPHOS 63 08/24/2017    BILITOTAL 0.4 08/24/2017     OTHER:   Lab Results   Component Value Date    A1C 5.2 03/11/2016    RENETTA 8.4 (L) 05/03/2021    LIPASE 141 08/24/2017    AMYLASE 51 08/24/2017    TSH 1.60 03/11/2016    CRP <2.9 08/24/2017    SED 9 08/24/2017       Anesthesia Plan    ASA Status:  2      Anesthesia Type: MAC.     - Reason for MAC: straight local not clinically adequate              Consents    Anesthesia Plan(s) and associated risks, benefits, and realistic alternatives discussed. Questions answered and patient/representative(s) expressed understanding.     - Discussed with:  Patient      - Extended Intubation/Ventilatory Support Discussed: Yes.      - Patient is DNR/DNI Status: No         Postoperative Care            Comments:    5/3/21 PHOEBE Arthur CRNA

## 2021-05-13 ENCOUNTER — OFFICE VISIT (OUTPATIENT)
Dept: FAMILY MEDICINE | Facility: OTHER | Age: 56
End: 2021-05-13
Attending: PODIATRIST
Payer: COMMERCIAL

## 2021-05-13 DIAGNOSIS — Z20.822 COVID-19 RULED OUT: Primary | ICD-10-CM

## 2021-05-13 LAB
SARS-COV-2 RNA RESP QL NAA+PROBE: NORMAL
SPECIMEN SOURCE: NORMAL

## 2021-05-13 PROCEDURE — U0003 INFECTIOUS AGENT DETECTION BY NUCLEIC ACID (DNA OR RNA); SEVERE ACUTE RESPIRATORY SYNDROME CORONAVIRUS 2 (SARS-COV-2) (CORONAVIRUS DISEASE [COVID-19]), AMPLIFIED PROBE TECHNIQUE, MAKING USE OF HIGH THROUGHPUT TECHNOLOGIES AS DESCRIBED BY CMS-2020-01-R: HCPCS | Performed by: PODIATRIST

## 2021-05-13 PROCEDURE — U0005 INFEC AGEN DETEC AMPLI PROBE: HCPCS | Performed by: PODIATRIST

## 2021-05-17 ENCOUNTER — ANESTHESIA (OUTPATIENT)
Dept: SURGERY | Facility: HOSPITAL | Age: 56
End: 2021-05-17
Payer: COMMERCIAL

## 2021-05-17 ENCOUNTER — HOSPITAL ENCOUNTER (OUTPATIENT)
Facility: HOSPITAL | Age: 56
Discharge: HOME | End: 2021-05-17
Attending: PODIATRIST | Admitting: PODIATRIST
Payer: COMMERCIAL

## 2021-05-17 VITALS
RESPIRATION RATE: 16 BRPM | HEIGHT: 66 IN | OXYGEN SATURATION: 100 % | SYSTOLIC BLOOD PRESSURE: 116 MMHG | DIASTOLIC BLOOD PRESSURE: 76 MMHG | HEART RATE: 59 BPM | WEIGHT: 186 LBS | TEMPERATURE: 97.2 F | BODY MASS INDEX: 29.89 KG/M2

## 2021-05-17 DIAGNOSIS — G89.18 POST-OP PAIN: Primary | ICD-10-CM

## 2021-05-17 PROCEDURE — 360N000076 HC SURGERY LEVEL 3, PER MIN: Performed by: PODIATRIST

## 2021-05-17 PROCEDURE — 999N000141 HC STATISTIC PRE-PROCEDURE NURSING ASSESSMENT: Performed by: PODIATRIST

## 2021-05-17 PROCEDURE — 250N000011 HC RX IP 250 OP 636: Performed by: PODIATRIST

## 2021-05-17 PROCEDURE — 370N000017 HC ANESTHESIA TECHNICAL FEE, PER MIN: Performed by: PODIATRIST

## 2021-05-17 PROCEDURE — 250N000011 HC RX IP 250 OP 636: Performed by: NURSE ANESTHETIST, CERTIFIED REGISTERED

## 2021-05-17 PROCEDURE — 28288 PARTIAL REMOVAL OF FOOT BONE: CPT | Performed by: NURSE ANESTHETIST, CERTIFIED REGISTERED

## 2021-05-17 PROCEDURE — 250N000009 HC RX 250: Performed by: PODIATRIST

## 2021-05-17 PROCEDURE — 250N000009 HC RX 250: Performed by: NURSE ANESTHETIST, CERTIFIED REGISTERED

## 2021-05-17 PROCEDURE — 258N000003 HC RX IP 258 OP 636: Performed by: NURSE ANESTHETIST, CERTIFIED REGISTERED

## 2021-05-17 PROCEDURE — 710N000012 HC RECOVERY PHASE 2, PER MINUTE: Performed by: PODIATRIST

## 2021-05-17 PROCEDURE — 272N000001 HC OR GENERAL SUPPLY STERILE: Performed by: PODIATRIST

## 2021-05-17 RX ORDER — ONDANSETRON 2 MG/ML
4 INJECTION INTRAMUSCULAR; INTRAVENOUS EVERY 30 MIN PRN
Status: DISCONTINUED | OUTPATIENT
Start: 2021-05-17 | End: 2021-05-17 | Stop reason: HOSPADM

## 2021-05-17 RX ORDER — MEPERIDINE HYDROCHLORIDE 25 MG/ML
12.5 INJECTION INTRAMUSCULAR; INTRAVENOUS; SUBCUTANEOUS
Status: DISCONTINUED | OUTPATIENT
Start: 2021-05-17 | End: 2021-05-17 | Stop reason: HOSPADM

## 2021-05-17 RX ORDER — LIDOCAINE 40 MG/G
CREAM TOPICAL
Status: DISCONTINUED | OUTPATIENT
Start: 2021-05-17 | End: 2021-05-17 | Stop reason: HOSPADM

## 2021-05-17 RX ORDER — ACETAMINOPHEN 325 MG/1
650 TABLET ORAL
Status: DISCONTINUED | OUTPATIENT
Start: 2021-05-17 | End: 2021-05-17 | Stop reason: HOSPADM

## 2021-05-17 RX ORDER — OXYCODONE HYDROCHLORIDE 5 MG/1
5 TABLET ORAL
Status: DISCONTINUED | OUTPATIENT
Start: 2021-05-17 | End: 2021-05-17 | Stop reason: HOSPADM

## 2021-05-17 RX ORDER — SODIUM CHLORIDE, SODIUM LACTATE, POTASSIUM CHLORIDE, CALCIUM CHLORIDE 600; 310; 30; 20 MG/100ML; MG/100ML; MG/100ML; MG/100ML
INJECTION, SOLUTION INTRAVENOUS CONTINUOUS
Status: DISCONTINUED | OUTPATIENT
Start: 2021-05-17 | End: 2021-05-17 | Stop reason: HOSPADM

## 2021-05-17 RX ORDER — FENTANYL CITRATE 50 UG/ML
25-50 INJECTION, SOLUTION INTRAMUSCULAR; INTRAVENOUS EVERY 5 MIN PRN
Status: DISCONTINUED | OUTPATIENT
Start: 2021-05-17 | End: 2021-05-17 | Stop reason: HOSPADM

## 2021-05-17 RX ORDER — OXYCODONE HYDROCHLORIDE 5 MG/1
5 TABLET ORAL EVERY 6 HOURS PRN
Qty: 12 TABLET | Refills: 0 | Status: SHIPPED | OUTPATIENT
Start: 2021-05-17 | End: 2021-06-22

## 2021-05-17 RX ORDER — CEFAZOLIN SODIUM 2 G/100ML
2 INJECTION, SOLUTION INTRAVENOUS
Status: COMPLETED | OUTPATIENT
Start: 2021-05-17 | End: 2021-05-17

## 2021-05-17 RX ORDER — NALOXONE HYDROCHLORIDE 0.4 MG/ML
0.4 INJECTION, SOLUTION INTRAMUSCULAR; INTRAVENOUS; SUBCUTANEOUS
Status: DISCONTINUED | OUTPATIENT
Start: 2021-05-17 | End: 2021-05-17 | Stop reason: HOSPADM

## 2021-05-17 RX ORDER — PROPOFOL 10 MG/ML
INJECTION, EMULSION INTRAVENOUS CONTINUOUS PRN
Status: DISCONTINUED | OUTPATIENT
Start: 2021-05-17 | End: 2021-05-17

## 2021-05-17 RX ORDER — ALBUTEROL SULFATE 0.83 MG/ML
2.5 SOLUTION RESPIRATORY (INHALATION) EVERY 4 HOURS PRN
Status: DISCONTINUED | OUTPATIENT
Start: 2021-05-17 | End: 2021-05-17 | Stop reason: HOSPADM

## 2021-05-17 RX ORDER — ONDANSETRON 4 MG/1
4 TABLET, ORALLY DISINTEGRATING ORAL
Status: DISCONTINUED | OUTPATIENT
Start: 2021-05-17 | End: 2021-05-17 | Stop reason: HOSPADM

## 2021-05-17 RX ORDER — LABETALOL 20 MG/4 ML (5 MG/ML) INTRAVENOUS SYRINGE
10
Status: DISCONTINUED | OUTPATIENT
Start: 2021-05-17 | End: 2021-05-17 | Stop reason: HOSPADM

## 2021-05-17 RX ORDER — CEFAZOLIN SODIUM 2 G/100ML
2 INJECTION, SOLUTION INTRAVENOUS SEE ADMIN INSTRUCTIONS
Status: DISCONTINUED | OUTPATIENT
Start: 2021-05-17 | End: 2021-05-17 | Stop reason: HOSPADM

## 2021-05-17 RX ORDER — ACETAMINOPHEN 325 MG/1
650 TABLET ORAL EVERY 4 HOURS PRN
Qty: 50 TABLET | Refills: 0 | Status: SHIPPED | OUTPATIENT
Start: 2021-05-17 | End: 2024-02-10

## 2021-05-17 RX ORDER — FENTANYL CITRATE 50 UG/ML
INJECTION, SOLUTION INTRAMUSCULAR; INTRAVENOUS PRN
Status: DISCONTINUED | OUTPATIENT
Start: 2021-05-17 | End: 2021-05-17

## 2021-05-17 RX ORDER — ONDANSETRON 4 MG/1
4 TABLET, ORALLY DISINTEGRATING ORAL EVERY 30 MIN PRN
Status: DISCONTINUED | OUTPATIENT
Start: 2021-05-17 | End: 2021-05-17 | Stop reason: HOSPADM

## 2021-05-17 RX ORDER — BUPIVACAINE HYDROCHLORIDE 5 MG/ML
INJECTION, SOLUTION PERINEURAL PRN
Status: DISCONTINUED | OUTPATIENT
Start: 2021-05-17 | End: 2021-05-17 | Stop reason: HOSPADM

## 2021-05-17 RX ORDER — HYDROMORPHONE HYDROCHLORIDE 1 MG/ML
.3-.5 INJECTION, SOLUTION INTRAMUSCULAR; INTRAVENOUS; SUBCUTANEOUS EVERY 10 MIN PRN
Status: DISCONTINUED | OUTPATIENT
Start: 2021-05-17 | End: 2021-05-17 | Stop reason: HOSPADM

## 2021-05-17 RX ORDER — NALOXONE HYDROCHLORIDE 0.4 MG/ML
0.2 INJECTION, SOLUTION INTRAMUSCULAR; INTRAVENOUS; SUBCUTANEOUS
Status: DISCONTINUED | OUTPATIENT
Start: 2021-05-17 | End: 2021-05-17 | Stop reason: HOSPADM

## 2021-05-17 RX ADMIN — PROPOFOL 75 MCG/KG/MIN: 10 INJECTION, EMULSION INTRAVENOUS at 07:30

## 2021-05-17 RX ADMIN — SODIUM CHLORIDE, POTASSIUM CHLORIDE, SODIUM LACTATE AND CALCIUM CHLORIDE: 600; 310; 30; 20 INJECTION, SOLUTION INTRAVENOUS at 07:17

## 2021-05-17 RX ADMIN — MIDAZOLAM 2 MG: 1 INJECTION INTRAMUSCULAR; INTRAVENOUS at 07:21

## 2021-05-17 RX ADMIN — CEFAZOLIN SODIUM 2 G: 2 INJECTION, SOLUTION INTRAVENOUS at 07:22

## 2021-05-17 RX ADMIN — FENTANYL CITRATE 100 MCG: 50 INJECTION, SOLUTION INTRAMUSCULAR; INTRAVENOUS at 07:27

## 2021-05-17 ASSESSMENT — MIFFLIN-ST. JEOR: SCORE: 1450.44

## 2021-05-17 NOTE — ANESTHESIA POSTPROCEDURE EVALUATION
Patient: Patience Mireles    Procedure(s):  Left 2nd Metatarsal Head Resection, Hardware Removal    Diagnosis:Retained orthopedic hardware [Z96.9]  Arthrosis [M19.90]  Diagnosis Additional Information: No value filed.    Anesthesia Type:  MAC    Note:  Disposition: Outpatient   Postop Pain Control: Uneventful            Sign Out: Well controlled pain   PONV: No   Neuro/Psych: Uneventful            Sign Out: Acceptable/Baseline neuro status   Airway/Respiratory: Uneventful            Sign Out: Acceptable/Baseline resp. status   CV/Hemodynamics: Uneventful            Sign Out: Acceptable CV status; No obvious hypovolemia; No obvious fluid overload   Other NRE: NONE   DID A NON-ROUTINE EVENT OCCUR? No           Last vitals:  Vitals:    05/17/21 0815 05/17/21 0820 05/17/21 0830   BP: 114/72 127/72 116/76   Pulse: 70 68 59   Resp:      Temp:      SpO2: 98% 98% 100%       Last vitals prior to Anesthesia Care Transfer:  CRNA VITALS  5/17/2021 0733 - 5/17/2021 0833      5/17/2021             Resp Rate (set):  8          Electronically Signed By: PHOEBE Loco CRNA  May 17, 2021  9:02 AM

## 2021-05-17 NOTE — OR NURSING
Patient and responsible adult given discharge instructions with no questions regarding instructions. Graciela score 20. Pain level 0/10.  Discharged from unit via wheelchair . Patient discharged to home .

## 2021-05-17 NOTE — OP NOTE
Procedure Date: 05/17/2021    SURGEON:  Mikhail Duncan MD    ASSISTANT:  Igor Atwood PA-C. A skilled first assistant was necessary due to technical complexity of the procedure and for the patient's safety.  The assistant helped with positioning, retraction, visualization of the operative field and moreover helped to complete the procedure in a technically safe and efficient manner.      PREOPERATIVE DIAGNOSIS:  Left second MPJ arthrosis and pain status post plantar plate repair done by another provider.    POSTOPERATIVE DIAGNOSIS:  Left second joint arthrosis and pain status post plantar plate repair done by another provider.    PROCEDURE:    1. Right second metatarsal hardware removal.  2.  Right partial second metatarsal head resection/condylectomy.    ANESTHESIA:  MAC with local.    HEMOSTASIS:  Ankle tourniquet electrocautery.    ESTIMATED BLOOD LOSS:  Minimal.    INDICATIONS FOR PROCEDURE:  This patient again underwent a plantar plate repair, second metatarsal shortening osteotomy as well as a fairly successful in stabilizing the MPJ.  She is overly stiff, had a hard time walking without pain.  Given the chronic nature of this, she elected to proceed with salvage with a met head resection, hardware removal after detailed discussion of risks, potential complications,,,, alternatives, and benefits.    DESCRIPTION OF PROCEDURE:  In supine position, utilized MAC anesthesia, local block performed.  Left lower extremity prepped and draped in the usual sterile fashion.  Ankle tourniquet utilized for duration. Attention directed to the left second MPJ.  Incision made spanning the metatarsophalangeal joint.  blunt and sharp dissection, the periosteal capsular tissue, incised the length of the incision, 2 screws in the metatarsal head were visualized and removed without incident this time, the distal aspect of the metatarsal head resected.  A plantar and medial condyles resected with rongeur.  All this was smoothed  with a hand rasp, flushed with copious amounts of normal saline, had a significant relaxation of this joint with shortening of the MPJ should allow for better movement and offload the submetatarsal head. There was extensive scar tissue.  The joint was arthritic with intraoperative examination so without a doubt this should help albeit not a perfect procedure.  Certainly should help with symptoms of motion of the second MPJ.  Given that this was a slightly loose.  We did tighten up the extensor tendon by shortening flushed with copious amounts of normal saline.  Capsular tissue repaired with 2-0 Vicryl and skin nonabsorbable suture placed in sterile dressing.  Postop shoe.  She will weightbear as tolerated.  Follow up as scheduled.    Mikhail Duncan DPM        D: 2021   T: 2021   MT: ruddy    Name:     CAR INIGUEZ  MRN:      [id number]        Account:        [id number]   :      1965           Procedure Date: 2021     Document: [id number]

## 2021-05-17 NOTE — BRIEF OP NOTE
[facility] Brief Operative Note    Pre-operative diagnosis: Retained orthopedic hardware [Z96.9]  Arthrosis [M19.90]   Post-operative diagnosis Same, with 2ND MTPJ arthrosis   Procedure: Procedure(s):  Left 2nd Metatarsal Head Resection, Hardware Removal   Surgeon(s): Surgeon(s) and Role:     * Mikhail Duncan DPM - Primary     * Igor Bassett PA - Assisting   Estimated blood loss: * No values recorded between 5/17/2021  7:41 AM and 5/17/2021  8:02 AM *    Specimens: * No specimens in log *   Findings: Consistent with diagnosis

## 2021-05-17 NOTE — DISCHARGE INSTRUCTIONS
Follow up appt  On May 27th in [place] at 1215  With Dr hernandes     Post-Anesthesia Patient Instructions    IMMEDIATELY FOLLOWING SURGERY:  Do not drive or operate machinery for the first twenty four hours after surgery.  Do not make any important decisions for twenty four hours after surgery or while taking narcotic pain medications or sedatives.  If you develop intractable nausea and vomiting or a severe headache please notify your doctor immediately.    FOLLOW-UP:  Please make an appointment with your surgeon as instructed. You do not need to follow up with anesthesia unless specifically instructed to do so.    WOUND CARE INSTRUCTIONS (if applicable):  Keep a dry clean dressing on the anesthesia/puncture wound site if there is drainage.  Once the wound has quit draining you may leave it open to air.  Generally you should leave the bandage intact for twenty four hours unless there is drainage.  If the epidural site drains for more than 36-48 hours please call the anesthesia department.    QUESTIONS?:  Please feel free to call your physician or the hospital  if you have any questions, and they will be happy to assist you.

## 2021-05-17 NOTE — ANESTHESIA CARE TRANSFER NOTE
Patient: Patience Mireles    Procedure(s):  Left 2nd Metatarsal Head Resection, Hardware Removal    Diagnosis: Retained orthopedic hardware [Z96.9]  Arthrosis [M19.90]  Diagnosis Additional Information: No value filed.    Anesthesia Type:   MAC     Note:      Level of Consciousness: drowsy  Oxygen Supplementation: room air    Independent Airway: airway patency satisfactory and stable  Dentition: dentition unchanged      Patient transferred to: Phase II    Handoff Report: Identifed the Patient, Identified the Reponsible Provider, Reviewed the pertinent medical history, Discussed the surgical course, Reviewed Intra-OP anesthesia mangement and issues during anesthesia, Set expectations for post-procedure period and Allowed opportunity for questions and acknowledgement of understanding      Vitals: (Last set prior to Anesthesia Care Transfer)  CRNA VITALS  5/17/2021 0733 - 5/17/2021 0804      5/17/2021             Resp Rate (set):  8        Electronically Signed By: PHOEBE Loco CRNA  May 17, 2021  8:04 AM

## 2021-05-24 ENCOUNTER — IMMUNIZATION (OUTPATIENT)
Dept: FAMILY MEDICINE | Facility: OTHER | Age: 56
End: 2021-05-24
Attending: FAMILY MEDICINE
Payer: COMMERCIAL

## 2021-05-24 PROCEDURE — 91300 PR COVID VAC PFIZER DIL RECON 30 MCG/0.3 ML IM: CPT

## 2021-05-24 PROCEDURE — 0002A PR COVID VAC PFIZER DIL RECON 30 MCG/0.3 ML IM: CPT

## 2021-05-27 ENCOUNTER — OFFICE VISIT (OUTPATIENT)
Dept: ORTHOPEDICS | Facility: OTHER | Age: 56
End: 2021-05-27
Attending: PODIATRIST
Payer: COMMERCIAL

## 2021-05-27 DIAGNOSIS — Z09 POSTOPERATIVE FOLLOW-UP: Primary | ICD-10-CM

## 2021-05-27 PROCEDURE — 99024 POSTOP FOLLOW-UP VISIT: CPT | Performed by: PODIATRIST

## 2021-05-27 NOTE — PROGRESS NOTES
Patient is here for follow up on her right foot.   Sandhya Garg LPN .....................5/27/2021 12:16 PM

## 2021-05-27 NOTE — PROGRESS NOTES
Visit Date: 2021    Car is here 10 days from out second met head resection hardware removal, doing well, no acute concern.      PHYSICAL EXAMINATION:   EXTREMITIES:  Surgical site well healed.  No signs of infection.  Sutures removed.  Steri-Strips applied.  CMS is intact.    ASSESSMENT:  Status post right foot surgery as described.      PLAN:  Discussed progression of treatment.  She will progress weightbearing in a postop shoe and progress into a normal tennis shoe as tolerated.  Follow up with me in 4 weeks.  She will start some range of motion exercises and some dorsal taping to stabilize.    Mikhail Duncan DPM        D: 2021   T: 2021   MT: [id number]    Name:     CAR INIGUEZ  MRN:      [id number]        Account:    [id number]   :      1965           Visit Date: 2021     Document: [id number]

## 2021-06-16 DIAGNOSIS — Z78.0 MENOPAUSE: ICD-10-CM

## 2021-06-16 RX ORDER — ESTRADIOL 0.1 MG/D
PATCH TRANSDERMAL
Qty: 12 PATCH | Refills: 1 | Status: SHIPPED | OUTPATIENT
Start: 2021-06-16 | End: 2022-05-19

## 2021-06-22 ENCOUNTER — OFFICE VISIT (OUTPATIENT)
Dept: CARDIOLOGY | Facility: OTHER | Age: 56
End: 2021-06-22
Attending: PHYSICIAN ASSISTANT
Payer: COMMERCIAL

## 2021-06-22 VITALS
TEMPERATURE: 97.1 F | RESPIRATION RATE: 16 BRPM | BODY MASS INDEX: 29.25 KG/M2 | WEIGHT: 182 LBS | HEIGHT: 66 IN | OXYGEN SATURATION: 98 % | SYSTOLIC BLOOD PRESSURE: 136 MMHG | DIASTOLIC BLOOD PRESSURE: 72 MMHG | HEART RATE: 77 BPM

## 2021-06-22 DIAGNOSIS — I77.810 ASCENDING AORTA DILATATION (H): Primary | ICD-10-CM

## 2021-06-22 DIAGNOSIS — Z82.49 FAMILY HISTORY OF ISCHEMIC HEART DISEASE: ICD-10-CM

## 2021-06-22 DIAGNOSIS — I77.89 ENLARGED AORTA (H): Primary | ICD-10-CM

## 2021-06-22 DIAGNOSIS — R93.1 AGATSTON CORONARY ARTERY CALCIUM SCORE LESS THAN 100: ICD-10-CM

## 2021-06-22 PROCEDURE — 99215 OFFICE O/P EST HI 40 MIN: CPT | Performed by: NURSE PRACTITIONER

## 2021-06-22 ASSESSMENT — MIFFLIN-ST. JEOR: SCORE: 1427.3

## 2021-06-22 ASSESSMENT — PAIN SCALES - GENERAL: PAINLEVEL: NO PAIN (1)

## 2021-06-22 NOTE — PATIENT INSTRUCTIONS
Thank you for allowing [facility] and our team to participate in your care. Please call our office at [phone] with scheduling questions or if you need to cancel or change your appointment. With any other questions or concerns you may call [facility] cardiology nurse at [phone].       If you experience chest pain, chest pressure, chest tightness, shortness of breath, fainting, lightheadedness, nausea, vomiting, or other concerning symptoms, please report to the Emergency Department or call 911. These symptoms may be emergent, and best treated in the Emergency Department.    Follow up in 6 months.    BP goal 130/80 at rest.     You will have an echocardiogram performed.  This is an ultrasound of the heart, that evaluates heart function.  The hospital scheduling department will call to schedule you for this test.           Patient Education     Treating a Thoracic Aortic Aneurysm (TAA)  Endovascular Graft  What is an aneurysm?  An aneurysm is a weak spot in the wall of an artery (blood vessel). The wall expands, or balloons out. If not treated, in time it may burst. This can cause serious bleeding and sometimes death.   What is a thoracic aortic aneurysm (TAA)?  A TAA is an aneurysm in the upper part of the aorta, the main artery that runs down the center of your body. You may have no symptoms, or you may feel:    Pain or tenderness in the chest, belly or back.    A deep, steady ache that may get better as you change positions.    Coughing, hoarseness, feeling short of breath.    Thoracic aortic aneurysm (TAA) repair  If you have a TAA, your doctor may place a stent inside the weak part of the aorta. A stent is a fabric tube with a metal frame.  1. We will give you medicine to help you sleep. Then, the doctor will make a small cut in your groin area. In some cases there will be two cuts, one on either side.  2. The doctor then inserts pencil-sized tubes through the cuts. He or she uses the tubes to carry  the stent to the aneurysm.  3. The doctor places the stent so that it lines the inside of the artery wall. This supports the wall and prevents blood from flowing into the aneurysm.  .  For informational purposes only. Not to replace the advice of your health care provider. Illustrations Illustration copyright (c) [employer]  [employer]. Text copyright   2016 [employer]. All rights reserved. [employer] 770887 - 1/14/16.  For informational purposes only. Not to replace the advice of your health care provider.  Copyright   2018 [employer]. All rights reserved.

## 2021-06-22 NOTE — PROGRESS NOTES
[facility] HEART CARE   CARDIOLOGY CONSULT     Patience Mireles   [address]   [facility] [address]    Morelia Rosenthal     Chief Complaint   Patient presents with     Heart Problem        HPI:   Ms. Mireles is a 56 year old male who presents for cardiology evaluation with identified thoracic aortic dilation. Patient has a limited PMH which includes menopausal state and s/p WERNER-BSO. Patients does not carry a history of tobacco abuse, HTN, HLD or DMII. No family history of aortic aneurysmal disease, EDS or Marfan's/marfanoid habitus. No genetic conditions known in her family associated to thoracic aortic aneurysm or dissection. Positive for family history of premature ischemic heart disease mainly on her mothers side of the family. Her mother passed secondary to massive MI at the age of 68.    Patient recently attended a CT health screening through [facility]. She had a total coronary calcium score of zero which places patient at very low risk for developing significant CAD. On CT she was identified to have a 4.3 cm dilation of the ascending thoracic aorta.    Patient has not underwent echocardiogram screening to assess for associated aortic valvular disease. Previous imaging of abdominal aorta has been unremarkable.     Patient does not know of any genetic conditions in her family that raise the risk for aortic aneurysm or rupture. No connective tissue disease known. No family history of aortic rupture or dissection.  No family history of bicuspid aortic valve known.  No history of giant cell arteritis, ankylosing spondylitis or other inflammatory conditions associated to thoracic aortic aneurysm.    Patient has remained largely asymptomatic with no acute chest pain back pain or abdominal pain.  No chest pain or pressure.  No anginal symptoms.  No increased dyspnea.  No lightheadedness or syncope.  No increased edema.  No palpitations.      PAST MEDICAL HISTORY:   Past Medical History:    Diagnosis Date     Asymptomatic varicose veins 7/22/2003     Carcinoma in situ of cervix uteri 9/20/2000     Chest pain, unspecified 6/5/2004     Dysmenorrhea 8/21/2000     Endometriosis of uterus 12/20/2000     Endometriosis, site unspecified 10/26/2000     Follow-up examination, following unspecified surgery 4/16/2001     Leukorrhea, not specified as infective 7/11/2007     Need for prophylactic vaccination and inoculation against viral hepatitis 1/20/2009     Nonallopathic lesion of cervical region, not elsewhere classified 2/23/2001     Other screening mammogram 8/29/2000     PONV (postoperative nausea and vomiting)      Routine general medical examination at a health care facility 12/19/2001          FAMILY HISTORY:   Family History   Problem Relation Age of Onset     Heart Disease Mother 68        MI; cause of death     C.A.D. Father      Diabetes Father      Hypertension Father      Osteoarthritis Father           PAST SURGICAL HISTORY:   Past Surgical History:   Procedure Laterality Date     BUNIONECTOMY Left 4/1/2019    Procedure: LEFT FOOT SECOND DIGIT WEIL OSTEOTOMY WITH POSSIBLE PLANTAR PLATE REPAIR;  Surgeon: Greta Pacheco DPM;  Location: [facility] OR     COLONOSCOPY N/A 11/6/2014    Procedure: COLONOSCOPY;  Surgeon: Quincy Fontanez MD;  Location: [facility] OR     ESOPHAGOSCOPY, GASTROSCOPY, DUODENOSCOPY (EGD), COMBINED  8/9/2013    Procedure: COMBINED ESOPHAGOSCOPY, GASTROSCOPY, DUODENOSCOPY (EGD);   ESOPHAGOGASTRODUODENOSCOPY WITH BIOPSIES;  Surgeon: Quincy Fontanez MD;  Location: [facility] OR     ESOPHAGOSCOPY, GASTROSCOPY, DUODENOSCOPY (EGD), COMBINED N/A 9/29/2020    Procedure: ESOPHAGOGASTRODUODENOSCOPY, WITH BIOPSY;  Surgeon: Damon Ruby MD;  Location: [facility] OR     HYSTERECTOMY TOTAL ABDOMINAL, BILATERAL SALPINGO-OOPHORECTOMY, COMBINED N/A 01/01/2004     REMOVE HARDWARE FOOT Left 5/17/2021    Procedure: Left 2nd Metatarsal Head Resection, Hardware Removal;  Surgeon: Mikhail Duncan DPM;   Location: [facility] OR          SOCIAL HISTORY:   Social History     Socioeconomic History     Marital status: Single     Spouse name: None     Number of children: None     Years of education: None     Highest education level: None   Occupational History     None   Social Needs     Financial resource strain: None     Food insecurity     Worry: None     Inability: None     Transportation needs     Medical: None     Non-medical: None   Tobacco Use     Smoking status: Former Smoker     Types: Cigarettes     Smokeless tobacco: Never Used   Substance and Sexual Activity     Alcohol use: Yes     Alcohol/week: 0.0 standard drinks     Drug use: None     Sexual activity: None   Lifestyle     Physical activity     Days per week: None     Minutes per session: None     Stress: None   Relationships     Social connections     Talks on phone: None     Gets together: None     Attends [employer]al service: None     Active member of club or organization: None     Attends meetings of clubs or organizations: None     Relationship status: None     Intimate partner violence     Fear of current or ex partner: None     Emotionally abused: None     Physically abused: None     Forced sexual activity: None   Other Topics Concern      Service Not Asked     Blood Transfusions Not Asked     Caffeine Concern Yes     Occupational Exposure Not Asked     Hobby Hazards Not Asked     Sleep Concern Not Asked     Stress Concern Not Asked     Weight Concern Not Asked     Special Diet Not Asked     Back Care Not Asked     Exercise Not Asked     Bike Helmet Not Asked     Seat Belt Not Asked     Self-Exams Not Asked     Parent/sibling w/ CABG, MI or angioplasty before 65F 55M? No   Social History Narrative     None          CURRENT MEDICATIONS:   Prior to Admission medications    Medication Sig Start Date End Date Taking? Authorizing Provider   acetaminophen (TYLENOL) 325 MG tablet Take 2 tablets (650 mg) by mouth every 4 hours as needed for mild pain 5/17/21  " Yes Mikhail Duncan DPM   estradiol (CLIMARA) 0.1 MG/24HR weekly patch PLACE 1 PATCH ON THE SKIN ONCE A WEEK 6/16/21  Yes Morelia Rosenthal PA   linaclotide (LINZESS) 145 MCG capsule Take 1 capsule (145 mcg) by mouth every morning (before breakfast)  Patient taking differently: Take 145 mcg by mouth daily as needed  3/25/19  Yes Morelia Rosenthal PA   loratadine (CLARITIN) 10 MG tablet Take 10 mg by mouth as needed    Yes Reported, Patient   valACYclovir (VALTREX) 1000 mg tablet TAKE 2 TABLETS BY MOUTH 2 TIMES A DAY 8/6/20  Yes Morena Ochoa MD          ALLERGIES:   No Known Allergies       ROS:   CONSTITUTIONAL: No reported fever or chills. No changes in weight.  ENT: No visual disturbance, ear ache, epistaxis or sore throat.   CARDIOVASCULAR: No chest pain, chest pressure or chest discomfort. No palpitations or lower extremity edema.   RESPIRATORY: No shortness of breath, dyspnea upon exertion, cough, wheezing or hemoptysis.   GI: No abdominal pain.   : No reported hematuria or dysuria.   NEUROLOGICAL: No lightheadedness, dizziness, syncope, ataxia, paresthesias or weakness.   HEMATOLOGIC: No history of anemia. No bleeding or excessive bruising. No history of blood clots.   MUSCULOSKELETAL: No new joint pain or swelling, no muscle pain.  ENDOCRINOLOGIC: No temperature intolerance. No hair or skin changes.  SKIN: No abnormal rashes or sores, no unusual itching.  PSYCHIATRIC: No history of depression or anxiety. No changes in mood, feeling down or anxious. No changes in sleep.      PHYSICAL EXAM:   /72 (BP Location: Right arm, Patient Position: Chair, Cuff Size: Adult Regular)   Pulse 77   Temp 97.1  F (36.2  C) (Tympanic)   Resp 16   Ht 1.676 m (5' 6\")   Wt 82.6 kg (182 lb)   SpO2 98%   BMI 29.38 kg/m    GENERAL: The patient is a well-developed, well-nourished, in no apparent distress.  HEENT: Head is normocephalic and atraumatic. Eyes are symmetrical with normal visual tracking. No icterus, no " xanthelasmas. Nares appeared normal without nasal drainage. Mucous membranes are moist, no cyanosis.  NECK: Supple. No cervical bruits, JVP not visible.   CHEST/ LUNGS: Lungs clear to auscultation, no rales, rhonchi or wheezes, no use of accessory muscles, no retractions, respirations unlabored and normal respiratory rate.   CARDIO: Regular rate and rhythm normal with S1 and S2, no S3 or S4 and no murmur, click or rub. Precordium quiet with normal PMI.    ABD: Abdomen is soft and nontender, nondistended. Without hepatosplenomegaly, no palpable masses, aorta not enlarged to palpitation and no abdominal bruits heard.   EXTREMITIES: No clubbing, cyanosis or edema present.   MUSCULOSKELETAL: No visible joint swelling.   NEUROLOGIC: Alert and oriented X3. Normal speech, gait and affect. No focal neurologic deficits.   SKIN: No jaundice. No rashes or visible skin lesions present. No ecchymosis.     EKG:    Normal sinus rhythm, rate 71 bpm.  No ST-T changes.    LAB RESULTS:   Office Visit on 05/13/2021   Component Date Value Ref Range Status     COVID-19 Virus PCR to [facility] - So* 05/13/2021 Nasopharyngeal   Final     COVID-19 Virus PCR to [facility] - Re* 05/13/2021 Test received-See reflex to [facility] test SARS CoV2 (COVID-19) Virus RT-PCR   Final     SARS-CoV-2 Virus Specimen Source 05/13/2021 Nasopharyngeal   Final     SARS-CoV-2 PCR Result 05/13/2021 NEGATIVE   Final     SARS-CoV-2 PCR Comment 05/13/2021    Final                    Value:Testing was performed using the [employer]plex[employer] COVID-19 Direct Assay on the [employer] L[employer]ison MDX   instrument. Additional information about this Emergency Use Authorization (EUA) assay can   be found via the Lab Guide.     Office Visit on 05/03/2021   Component Date Value Ref Range Status     Sodium 05/03/2021 139  133 - 144 mmol/L Final     Potassium 05/03/2021 3.8  3.4 - 5.3 mmol/L Final     Chloride 05/03/2021 107  94 - 109 mmol/L Final     Carbon Dioxide 05/03/2021 24  20 - 32 mmol/L Final      Anion Gap 05/03/2021 8  3 - 14 mmol/L Final     Glucose 05/03/2021 87  70 - 99 mg/dL Final     Urea Nitrogen 05/03/2021 17  7 - 30 mg/dL Final     Creatinine 05/03/2021 0.69  0.52 - 1.04 mg/dL Final     GFR Estimate 05/03/2021 >90  >60 mL/min/[1.73_m2] Final     GFR Estimate If Black 05/03/2021 >90  >60 mL/min/[1.73_m2] Final     Calcium 05/03/2021 8.4* 8.5 - 10.1 mg/dL Final     WBC 05/03/2021 7.0  4.0 - 11.0 10e9/L Final     RBC Count 05/03/2021 4.34  3.8 - 5.2 10e12/L Final     Hemoglobin 05/03/2021 13.4  11.7 - 15.7 g/dL Final     Hematocrit 05/03/2021 40.2  35.0 - 47.0 % Final     MCV 05/03/2021 93  78 - 100 fl Final     MCH 05/03/2021 30.9  26.5 - 33.0 pg Final     MCHC 05/03/2021 33.3  31.5 - 36.5 g/dL Final     RDW 05/03/2021 12.7  10.0 - 15.0 % Final     Platelet Count 05/03/2021 312  150 - 450 10e9/L Final     Diff Method 05/03/2021 Automated Method   Final     % Neutrophils 05/03/2021 57.0  % Final     % Lymphocytes 05/03/2021 30.6  % Final     % Monocytes 05/03/2021 10.6  % Final     % Eosinophils 05/03/2021 0.4  % Final     % Basophils 05/03/2021 1.3  % Final     % Immature Granulocytes 05/03/2021 0.1  % Final     Nucleated RBCs 05/03/2021 0  0 /100 Final     Absolute Neutrophil 05/03/2021 4.0  1.6 - 8.3 10e9/L Final     Absolute Lymphocytes 05/03/2021 2.1  0.8 - 5.3 10e9/L Final     Absolute Monocytes 05/03/2021 0.7  0.0 - 1.3 10e9/L Final     Absolute Eosinophils 05/03/2021 0.0  0.0 - 0.7 10e9/L Final     Absolute Basophils 05/03/2021 0.1  0.0 - 0.2 10e9/L Final     Abs Immature Granulocytes 05/03/2021 0.0  0 - 0.4 10e9/L Final     Absolute Nucleated RBC 05/03/2021 0.0   Final          ASSESSMENT:   Patience Mireles presents for cardiology evaluation with identified thoracic aortic dilation, 4.3 cm dilation of the ascending aorta. Patient has a limited PMH which includes menopausal state and s/p WERNER-BSO. Patients does not carry a history of tobacco abuse, HTN, HLD or DMII. No family history of  aortic aneurysmal disease, EDS or Marfan's/marfanoid habitus. No genetic conditions known in her family associated to thoracic aortic aneurysm or dissection.  Patient does not know of any genetic conditions in her family that raise the risk for aortic aneurysm or rupture. No connective tissue disease known. No family history of aortic rupture or dissection.  No family history of bicuspid aortic valve known.  No history of giant cell arteritis, ankylosing spondylitis or other inflammatory conditions associated to thoracic aortic aneurysm.  Patient has remained largely asymptomatic with no acute chest pain back pain or abdominal pain.  No chest pain or pressure.  No anginal symptoms.  No increased dyspnea.  No lightheadedness or syncope.  No increased edema.  No palpitations.    1. Ascending aorta dilatation (H)  2. Agatston coronary artery calcium score less than 100  3. Family history of ischemic heart disease    PLAN:   1. CT evidence of thoracic ascending aortic dilatation, 4.3cm. Asymptomatic.   2. Recommended TTE for structural exam and aortic valve assessment.   3. No family history of aortic rupture or dissection.  No family history of bicuspid aortic valve known. Patient does not know of any genetic conditions in her family that raise the risk for aortic aneurysm or rupture. No connective tissue disease known. Patient does not display any personal characteristics of EDS or Marfan's/marfanoid habitus.   4. She does not use tobacco. Recommended we reassess lipids, she is not on statin. Recommended BP goal <130/80 at rest. If BP elevated above this recommendation, consider starting BB as first line treatment with TAA.   5. Avoid use of fluoroquinolone antibiotics which have been known to increase the risk of aortic dissection and aneurysmal rupture.  6.  Surveillance for asymptomatic TAA patient with dilatation at <4.5 cm is recommended at 6 months.   7. Continue with heart healthy lifestyle. Reassurance  provided with total coronary calcium score of 0 which places patient at very low risk for developing any form of coronary disease.     Follow-up with cardiology in 6 months, certainly sooner if needed.       Thank you for allowing me to participate in the care of your patient. Please do not hesitate to contact me if you have any questions.     Total time 60 min on date of encounter spent reviewing records, face-to-face time obtaining HPI, physical exam, counseling on results of previous CT imaging and counseling on the above diagnoses and recommended plan of care.    Caty Salinas, APRN CNP [facility]

## 2021-06-22 NOTE — NURSING NOTE
"Chief Complaint   Patient presents with     Heart Problem       Initial /72 (BP Location: Right arm, Patient Position: Chair, Cuff Size: Adult Regular)   Pulse 77   Temp 97.1  F (36.2  C) (Tympanic)   Resp 16   Ht 1.676 m (5' 6\")   Wt 82.6 kg (182 lb)   SpO2 98%   BMI 29.38 kg/m   Estimated body mass index is 29.38 kg/m  as calculated from the following:    Height as of this encounter: 1.676 m (5' 6\").    Weight as of this encounter: 82.6 kg (182 lb).  Medication Reconciliation: complete  SUKUMAR HENNESSY LPN    "

## 2021-06-24 ENCOUNTER — HOSPITAL ENCOUNTER (OUTPATIENT)
Dept: CARDIOLOGY | Facility: HOSPITAL | Age: 56
Discharge: HOME | End: 2021-06-24
Attending: NURSE PRACTITIONER | Admitting: NURSE PRACTITIONER
Payer: COMMERCIAL

## 2021-06-24 DIAGNOSIS — I77.810 ASCENDING AORTA DILATATION (H): ICD-10-CM

## 2021-06-24 PROCEDURE — 93306 TTE W/DOPPLER COMPLETE: CPT

## 2021-06-24 PROCEDURE — 93306 TTE W/DOPPLER COMPLETE: CPT | Mod: 26 | Performed by: INTERNAL MEDICINE

## 2021-06-25 DIAGNOSIS — Z82.49 FAMILY HISTORY OF ISCHEMIC HEART DISEASE: ICD-10-CM

## 2021-06-25 DIAGNOSIS — I77.810 ASCENDING AORTA DILATATION (H): ICD-10-CM

## 2021-06-25 DIAGNOSIS — R93.1 AGATSTON CORONARY ARTERY CALCIUM SCORE LESS THAN 100: ICD-10-CM

## 2021-06-25 LAB
CHOLEST SERPL-MCNC: 218 MG/DL
HDLC SERPL-MCNC: 82 MG/DL
LDLC SERPL CALC-MCNC: 126 MG/DL
NONHDLC SERPL-MCNC: 136 MG/DL
TRIGL SERPL-MCNC: 51 MG/DL

## 2021-06-25 PROCEDURE — 80061 LIPID PANEL: CPT | Performed by: NURSE PRACTITIONER

## 2021-06-25 PROCEDURE — 36415 COLL VENOUS BLD VENIPUNCTURE: CPT | Performed by: NURSE PRACTITIONER

## 2021-07-09 DIAGNOSIS — I77.810 ASCENDING AORTA DILATATION (H): Primary | ICD-10-CM

## 2021-07-22 ENCOUNTER — HOSPITAL ENCOUNTER (OUTPATIENT)
Dept: CT IMAGING | Facility: HOSPITAL | Age: 56
Discharge: HOME | End: 2021-07-22
Attending: NURSE PRACTITIONER | Admitting: NURSE PRACTITIONER
Payer: COMMERCIAL

## 2021-07-22 DIAGNOSIS — I77.810 ASCENDING AORTA DILATATION (H): ICD-10-CM

## 2021-07-22 PROCEDURE — 250N000011 HC RX IP 250 OP 636: Performed by: RADIOLOGY

## 2021-07-22 PROCEDURE — 71275 CT ANGIOGRAPHY CHEST: CPT

## 2021-07-22 RX ORDER — IOPAMIDOL 755 MG/ML
100 INJECTION, SOLUTION INTRAVASCULAR ONCE
Status: COMPLETED | OUTPATIENT
Start: 2021-07-22 | End: 2021-07-22

## 2021-07-22 RX ADMIN — IOPAMIDOL 100 ML: 755 INJECTION, SOLUTION INTRAVENOUS at 08:11

## 2022-04-26 DIAGNOSIS — B00.2 ORAL HERPES: ICD-10-CM

## 2022-04-27 RX ORDER — VALACYCLOVIR HYDROCHLORIDE 1 G/1
TABLET, FILM COATED ORAL
Qty: 8 TABLET | Refills: 0 | Status: SHIPPED | OUTPATIENT
Start: 2022-04-27 | End: 2023-01-06

## 2022-04-27 NOTE — TELEPHONE ENCOUNTER
valtrex      Last Written Prescription Date:  8/6/2020  Last Fill Quantity: 8,   # refills: 0  Last Office Visit: 5/3/21  Future Office visit:

## 2022-05-18 DIAGNOSIS — Z78.0 MENOPAUSE: ICD-10-CM

## 2022-05-19 RX ORDER — ESTRADIOL 0.1 MG/D
PATCH TRANSDERMAL
Qty: 12 PATCH | Refills: 0 | Status: SHIPPED | OUTPATIENT
Start: 2022-05-19 | End: 2023-01-06

## 2022-05-23 ENCOUNTER — TELEPHONE (OUTPATIENT)
Dept: FAMILY MEDICINE | Facility: OTHER | Age: 57
End: 2022-05-23

## 2022-05-23 NOTE — TELEPHONE ENCOUNTER
9:26 AM    Reason for Call: OVERBOOK    Patient is having the following symptoms: Patient needs to be scheduled  for tenderness r arm.  Patient has had this for a couple months but wants to get in soon 0 days.    The patient is requesting an appointment for Overbook with Morelia Rosenthal.    Was an appointment offered for this call? No  If yes : Appointment type              Date    Preferred method for responding to this message: Telephone Call  What is your phone number ? [phone]    If we cannot reach you directly, may we leave a detailed response at the number you provided? Yes    Can this message wait until your PCP/provider returns, if unavailable today? YES, Prod    Enedelia Salvador

## 2022-05-26 NOTE — PROGRESS NOTES
"  Assessment & Plan     Armpit pain, right  Getting mammogram bilaterally labs and us on right side only.    - MA Diagnostic Bilateral w/Yonatan; Future  - CBC with platelets and differential; Future  - CRP, inflammation; Future  - Comprehensive metabolic panel (BMP + Alb, Alk Phos, ALT, AST, Total. Bili, TP); Future    Review of external notes as documented elsewhere in note  Ordering of each unique test  Prescription drug management  10 minutes spent on the date of the encounter doing chart review, history and exam, documentation and further activities per the note       BMI:   Estimated body mass index is 29.38 kg/m  as calculated from the following:    Height as of this encounter: 1.676 m (5' 6\").    Weight as of this encounter: 82.6 kg (182 lb).   Weight management plan: Discussed healthy diet and exercise guidelines    See Patient Instructions    No follow-ups on file.    CAROLINA Goldberg  [facility] - VERONIKA Morin is a 56 year old who presents for the following health issues     HPI     Concern - Tenderness right armpit  Onset: 3 months ago it started  Description: right armpit tenderness  Intensity: tenderness  Progression of Symptoms:  same  Accompanying Signs & Symptoms: feels like there is a pressure there, feels it more while she is laying on her side  Previous history of similar problem: none  Precipitating factors:        Worsened by: none  Alleviating factors:        Improved by: none  Therapies tried and outcome: needs a mammogram        Review of Systems   CONSTITUTIONAL: NEGATIVE for fever, chills, change in weight  ENT/MOUTH: NEGATIVE for ear, mouth and throat problems  RESP: NEGATIVE for significant cough or SOB  RESP:NEGATIVE for significant cough or SOB  CV: NEGATIVE for chest pain, palpitations or peripheral edema  MUSCULOSKELETAL: has pain in her arm pit and goes down chest wall into the elbow.   NEURO: numbness and tingling.   ENDOCRINE: NEGATIVE for " "temperature intolerance, skin/hair changes  PSYCHIATRIC: NEGATIVE for changes in mood or affect      Objective    /70 (BP Location: Left arm, Patient Position: Sitting, Cuff Size: Adult Regular)   Pulse 65   Temp 97  F (36.1  C) (Tympanic)   Ht 1.676 m (5' 6\")   Wt 82.6 kg (182 lb)   SpO2 97%   BMI 29.38 kg/m    Body mass index is 29.38 kg/m .  Physical Exam   GENERAL: healthy, alert and no distress  EYES: Eyes grossly normal to inspection, PERRL and conjunctivae and sclerae normal  HENT: ear canals and TM's normal, nose and mouth without ulcers or lesions  NECK: no adenopathy, no asymmetry, masses, or scars and thyroid normal to palpation  RESP: lungs clear to auscultation - no rales, rhonchi or wheezes  CV: regular rate and rhythm, normal S1 S2, no S3 or S4, no murmur, click or rub, no peripheral edema and peripheral pulses strong  Breasts: normal without suspicious masses, skin changes or axillary nodes, symmetric fibrous changes. No masses appreciated or felt.   ABDOMEN: soft, nontender, no hepatosplenomegaly, no masses and bowel sounds normal  MS: no gross musculoskeletal defects noted, no edema  SKIN: no suspicious lesions or rashes  NEURO: Normal strength and tone, mentation intact and speech normal                "

## 2022-06-01 ENCOUNTER — OFFICE VISIT (OUTPATIENT)
Dept: FAMILY MEDICINE | Facility: OTHER | Age: 57
End: 2022-06-01
Attending: PHYSICIAN ASSISTANT
Payer: COMMERCIAL

## 2022-06-01 VITALS
BODY MASS INDEX: 29.25 KG/M2 | OXYGEN SATURATION: 97 % | WEIGHT: 182 LBS | SYSTOLIC BLOOD PRESSURE: 130 MMHG | DIASTOLIC BLOOD PRESSURE: 70 MMHG | HEIGHT: 66 IN | TEMPERATURE: 97 F | HEART RATE: 65 BPM

## 2022-06-01 DIAGNOSIS — M79.621 ARMPIT PAIN, RIGHT: Primary | ICD-10-CM

## 2022-06-01 LAB
ALBUMIN SERPL-MCNC: 3.7 G/DL (ref 3.4–5)
ALP SERPL-CCNC: 65 U/L (ref 40–150)
ALT SERPL W P-5'-P-CCNC: 19 U/L (ref 0–50)
ANION GAP SERPL CALCULATED.3IONS-SCNC: 5 MMOL/L (ref 3–14)
AST SERPL W P-5'-P-CCNC: 14 U/L (ref 0–45)
BASOPHILS # BLD AUTO: 0.1 10E3/UL (ref 0–0.2)
BASOPHILS NFR BLD AUTO: 1 %
BILIRUB SERPL-MCNC: 0.3 MG/DL (ref 0.2–1.3)
BUN SERPL-MCNC: 12 MG/DL (ref 7–30)
CALCIUM SERPL-MCNC: 8.6 MG/DL (ref 8.5–10.1)
CHLORIDE BLD-SCNC: 106 MMOL/L (ref 94–109)
CO2 SERPL-SCNC: 28 MMOL/L (ref 20–32)
CREAT SERPL-MCNC: 0.7 MG/DL (ref 0.52–1.04)
CRP SERPL-MCNC: <2.9 MG/L (ref 0–8)
EOSINOPHIL # BLD AUTO: 0 10E3/UL (ref 0–0.7)
EOSINOPHIL NFR BLD AUTO: 1 %
ERYTHROCYTE [DISTWIDTH] IN BLOOD BY AUTOMATED COUNT: 12.4 % (ref 10–15)
GFR SERPL CREATININE-BSD FRML MDRD: >90 ML/MIN/1.73M2
GLUCOSE BLD-MCNC: 96 MG/DL (ref 70–99)
HCT VFR BLD AUTO: 42 % (ref 35–47)
HGB BLD-MCNC: 13.9 G/DL (ref 11.7–15.7)
IMM GRANULOCYTES # BLD: 0 10E3/UL
IMM GRANULOCYTES NFR BLD: 0 %
LYMPHOCYTES # BLD AUTO: 2 10E3/UL (ref 0.8–5.3)
LYMPHOCYTES NFR BLD AUTO: 33 %
MCH RBC QN AUTO: 30.8 PG (ref 26.5–33)
MCHC RBC AUTO-ENTMCNC: 33.1 G/DL (ref 31.5–36.5)
MCV RBC AUTO: 93 FL (ref 78–100)
MONOCYTES # BLD AUTO: 0.6 10E3/UL (ref 0–1.3)
MONOCYTES NFR BLD AUTO: 11 %
NEUTROPHILS # BLD AUTO: 3.2 10E3/UL (ref 1.6–8.3)
NEUTROPHILS NFR BLD AUTO: 54 %
NRBC # BLD AUTO: 0 10E3/UL
NRBC BLD AUTO-RTO: 0 /100
PLATELET # BLD AUTO: 322 10E3/UL (ref 150–450)
POTASSIUM BLD-SCNC: 3.6 MMOL/L (ref 3.4–5.3)
PROT SERPL-MCNC: 7.4 G/DL (ref 6.8–8.8)
RBC # BLD AUTO: 4.52 10E6/UL (ref 3.8–5.2)
SODIUM SERPL-SCNC: 139 MMOL/L (ref 133–144)
WBC # BLD AUTO: 5.9 10E3/UL (ref 4–11)

## 2022-06-01 PROCEDURE — 36415 COLL VENOUS BLD VENIPUNCTURE: CPT | Performed by: PHYSICIAN ASSISTANT

## 2022-06-01 PROCEDURE — 86140 C-REACTIVE PROTEIN: CPT | Performed by: PHYSICIAN ASSISTANT

## 2022-06-01 PROCEDURE — 80053 COMPREHEN METABOLIC PANEL: CPT | Performed by: PHYSICIAN ASSISTANT

## 2022-06-01 PROCEDURE — 85025 COMPLETE CBC W/AUTO DIFF WBC: CPT | Performed by: PHYSICIAN ASSISTANT

## 2022-06-01 PROCEDURE — 99213 OFFICE O/P EST LOW 20 MIN: CPT | Performed by: PHYSICIAN ASSISTANT

## 2022-06-01 ASSESSMENT — ANXIETY QUESTIONNAIRES
3. WORRYING TOO MUCH ABOUT DIFFERENT THINGS: NOT AT ALL
GAD7 TOTAL SCORE: 0
7. FEELING AFRAID AS IF SOMETHING AWFUL MIGHT HAPPEN: NOT AT ALL
GAD7 TOTAL SCORE: 0
4. TROUBLE RELAXING: NOT AT ALL
1. FEELING NERVOUS, ANXIOUS, OR ON EDGE: NOT AT ALL
2. NOT BEING ABLE TO STOP OR CONTROL WORRYING: NOT AT ALL
6. BECOMING EASILY ANNOYED OR IRRITABLE: NOT AT ALL
5. BEING SO RESTLESS THAT IT IS HARD TO SIT STILL: NOT AT ALL

## 2022-06-01 ASSESSMENT — PATIENT HEALTH QUESTIONNAIRE - PHQ9: SUM OF ALL RESPONSES TO PHQ QUESTIONS 1-9: 0

## 2022-06-01 NOTE — PATIENT INSTRUCTIONS
Thank you for choosing [facility].   I have office hours 8:00 am to 4:30 pm on Monday's, Wednesday's, Thursday's and Friday's. My nurse and I are out of the office every Tuesday.    Following your visit, when your labs and diagnostic testing have returned, I will review then and you will be contacted by my nurse.  If you are on My Chart, you can also view results there.    For refills, notify your pharmacy regarding what you need and the pharmacy will generate a refill request. Do not call my nurse as she is unable to process refill request. Please plan ahead and allow 3-5 days for refill requests.    You will generally receive a reminder call the day prior to your appointment.  If you cannot attend your appointment, please cancel your appointment with as much notice as possible.  If there is a pattern of failure to present for your appointments, I cannot provide consistent, meaningful, ongoing care for you. It is very important to me that you come in for your care, so we can best assist you with your health care needs.    IMPORTANT:  Please note that it is my standard of practice to NOT participate in prescribing ongoing requested Narcotic Analgesic therapy, and/or participate in the prescribing of other controlled substances.  My nurse and I am happy to assist you with the process of referral for alternative pain management as needed, and other treatment modalities including but not limited to:  Physical Therapy, Physical Medicine and Rehab, Counseling, Chiropractic Care, Orthopedic Care, and non-narcotic medication management.     In the event that you need to be seen for emergent concerns and I am out of office,  please see one of my colleagues for acute concerns.  You may also present to  or ER.  I appreciate the opportunity to serve you and look forward to supporting your healthcare needs in the future. Please contact me with any questions or concerns that you may  have.    Sincerely,      Morelia Rosenthal RN, PA-C

## 2022-06-01 NOTE — NURSING NOTE
"Chief Complaint   Patient presents with     right armpit pain       Initial /70 (BP Location: Left arm, Patient Position: Sitting, Cuff Size: Adult Regular)   Pulse 65   Temp 97  F (36.1  C) (Tympanic)   Ht 1.676 m (5' 6\")   Wt 82.6 kg (182 lb)   SpO2 97%   BMI 29.38 kg/m   Estimated body mass index is 29.38 kg/m  as calculated from the following:    Height as of this encounter: 1.676 m (5' 6\").    Weight as of this encounter: 82.6 kg (182 lb).  Medication Reconciliation: complete  Sandra Hernandez LPN  "

## 2022-06-03 ENCOUNTER — HOSPITAL ENCOUNTER (OUTPATIENT)
Dept: MAMMOGRAPHY | Facility: OTHER | Age: 57
Discharge: HOME | End: 2022-06-03
Attending: PHYSICIAN ASSISTANT
Payer: COMMERCIAL

## 2022-06-03 ENCOUNTER — HOSPITAL ENCOUNTER (OUTPATIENT)
Dept: ULTRASOUND IMAGING | Facility: HOSPITAL | Age: 57
Discharge: HOME | End: 2022-06-03
Attending: PHYSICIAN ASSISTANT
Payer: COMMERCIAL

## 2022-06-03 DIAGNOSIS — M79.621 ARMPIT PAIN, RIGHT: ICD-10-CM

## 2022-06-03 PROCEDURE — 77062 BREAST TOMOSYNTHESIS BI: CPT | Mod: TC | Performed by: RADIOLOGY

## 2022-06-03 PROCEDURE — 76642 ULTRASOUND BREAST LIMITED: CPT | Mod: RT

## 2022-06-03 PROCEDURE — 77066 DX MAMMO INCL CAD BI: CPT | Mod: TC | Performed by: RADIOLOGY

## 2023-01-04 DIAGNOSIS — B00.2 ORAL HERPES: ICD-10-CM

## 2023-01-04 DIAGNOSIS — Z78.0 MENOPAUSE: ICD-10-CM

## 2023-01-06 RX ORDER — ESTRADIOL 0.1 MG/D
PATCH TRANSDERMAL
Qty: 12 PATCH | Refills: 1 | Status: SHIPPED | OUTPATIENT
Start: 2023-01-06 | End: 2024-03-28

## 2023-01-06 RX ORDER — VALACYCLOVIR HYDROCHLORIDE 1 G/1
TABLET, FILM COATED ORAL
Qty: 8 TABLET | Refills: 3 | Status: SHIPPED | OUTPATIENT
Start: 2023-01-06 | End: 2024-06-02

## 2023-01-06 NOTE — TELEPHONE ENCOUNTER
valACYclovir (VALTREX) 1000 mg tablet 8 tablet 0 4/27/2022      estradiol (CLIMARA) 0.1 MG/24HR weekly patch 12 patch 0 5/19/2022     lov 06/01/22

## 2023-02-14 ENCOUNTER — DOCUMENTATION ONLY (OUTPATIENT)
Dept: OTHER | Facility: CLINIC | Age: 58
End: 2023-02-14

## 2023-10-25 ENCOUNTER — TELEPHONE (OUTPATIENT)
Dept: FAMILY MEDICINE | Facility: OTHER | Age: 58
End: 2023-10-25

## 2023-10-30 NOTE — TELEPHONE ENCOUNTER
10:52 AM    Reason for Call: OVERBOOK    Patient is having the following symptoms: Patient has a spot on her leg and she is concerned it is skin cancer and patient was hoping for a virtual visit and this has been there for 6 months or longer and it hasn't really healed. .    The patient is requesting an appointment for Virtual visit with Morelia Rosenthal.    Was an appointment offered for this call? No  If yes : Appointment type              Date    Preferred method for responding to this message: Telephone Call  What is your phone number ? [phone]     If we cannot reach you directly, may we leave a detailed response at the number you provided? Yes    Can this message wait until your PCP/provider returns, if unavailable today? No,       
Have her place her self on cancel list.  And I will send to the [facility]. Or Dr. Prince in [place]. 
Have her send me a picture.   
Patient sent picture to [facility]. Patient states they cannot get seen by [facility] until April.  
Patient would like to have referral sent to Dr Lawson and be put on a waiting list.   
Pt scheduled with Dr. Hurst 2/5/24 and placed on cancel list per patient request.   
When would you like to have the appointment? Or would you rather them come in person?  
Detail Level: Simple
Additional Notes: Patient consent was obtained to proceed with the visit and recommended plan of care after discussion of all risks and benefits, including the risks of COVID-19 exposure.
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
Detail Level: Detailed
Render Risk Assessment In Note?: yes
Additional Notes: Patient can continue his curology compound (tretinoin and azelic acid), but he must use it at a separate time than when he uses tretinoin rx today.

## 2024-01-08 NOTE — PROGRESS NOTES
Chief complaint: Patient presents with:  Surgical Followup      History of Present Illness: This 53 year old female is seen for her final post-up evaluation of a LEFT foot 2nd metatarsal Weil osteotomy and plantar plate repair (DOS 04/01/2019). She complains of her toe not being able to move. She has some discomfort and pain from the toe not moving when she is walking. She is wondering if she can start doing some ROM motions with the toe. She is not having the pain she had prior to surgery. She had x-rays prior to today's appointment. She is wearing regular tennis shoes throughout the day.  No further pedal complaints today.      History of pain: Pain has been present for over five years. Dr. Lee Smith gave her an injection a couple times, but she sought a second opinion by Dr. Woody through [facility]. He also provided a couple injections which temporarily helped, but the pain came back. She was scheduled for surgery with Dr. Woody, but she thought she may want a second opinion before jumping into surgery and she cancelled the surgery. Dr. Woody then moved to [place]. She presented to this clinic for an opinion on what she should have done.       /74 (Cuff Size: Adult Regular)   Pulse 78   Temp 97.5  F (36.4  C) (Tympanic)     Patient Active Problem List   Diagnosis     Menopause     ACP (advance care planning)     Status post WERNER-BSO       Past Surgical History:   Procedure Laterality Date     BUNIONECTOMY Left 4/1/2019    Procedure: LEFT FOOT SECOND DIGIT WEIL OSTEOTOMY WITH POSSIBLE PLANTAR PLATE REPAIR;  Surgeon: Greta Pacheco DPM;  [facility]     COLONOSCOPY N/A 11/6/2014    Procedure: COLONOSCOPY;  Surgeon: Quincy Fontanez MD;  [facility]     ESOPHAGOSCOPY, GASTROSCOPY, DUODENOSCOPY (EGD), COMBINED  8/9/2013    Procedure: COMBINED ESOPHAGOSCOPY, GASTROSCOPY, DUODENOSCOPY (EGD);   ESOPHAGOGASTRODUODENOSCOPY WITH BIOPSIES;  Surgeon: Quincy Fontanez MD;  Location:  [facility]     HYSTERECTOMY TOTAL ABDOMINAL, BILATERAL SALPINGO-OOPHORECTOMY, COMBINED N/A 01/01/2004       Current Outpatient Medications   Medication     estradiol (VIVELLE-DOT) 0.1 MG/24HR bi-weekly patch     linaclotide (LINZESS) 145 MCG capsule     loratadine (CLARITIN) 10 MG tablet     valACYclovir (VALTREX) 1000 mg tablet     No current facility-administered medications for this visit.         No Known Allergies    Family History   Problem Relation Age of Onset     Heart Disease Mother 68        MI; cause of death     C.A.D. Father      Diabetes Father      Hypertension Father      Osteoarthritis Father        Social History     Socioeconomic History     Marital status: Single     Spouse name: None     Number of children: None     Years of education: None     Highest education level: None   Occupational History     None   Social Needs     Financial resource strain: None     Food insecurity:     Worry: None     Inability: None     Transportation needs:     Medical: None     Non-medical: None   Tobacco Use     Smoking status: Former Smoker     Types: Cigarettes     Smokeless tobacco: Never Used   Substance and Sexual Activity     Alcohol use: Yes     Alcohol/week: 0.0 oz     Drug use: None     Sexual activity: None   Lifestyle     Physical activity:     Days per week: None     Minutes per session: None     Stress: None   Relationships     Social connections:     Talks on phone: None     Gets together: None     Attends Anabaptism service: None     Active member of club or organization: None     Attends meetings of clubs or organizations: None     Relationship status: None     Intimate partner violence:     Fear of current or ex partner: None     Emotionally abused: None     Physically abused: None     Forced sexual activity: None   Other Topics Concern      Service Not Asked     Blood Transfusions Not Asked     Caffeine Concern Yes     Occupational Exposure Not Asked     Hobby Hazards Not Asked     Sleep  Concern Not Asked     Stress Concern Not Asked     Weight Concern Not Asked     Special Diet Not Asked     Back Care Not Asked     Exercise Not Asked     Bike Helmet Not Asked     Seat Belt Not Asked     Self-Exams Not Asked     Parent/sibling w/ CABG, MI or angioplasty before 65F 55M? No   Social History Narrative     None       ROS: 10 point ROS neg other than the symptoms noted above in the HPI.  EXAM  Constitutional: healthy, alert and no distress     Psychiatric: mentation appears normal and affect normal/bright     LEFT FOOT FOCUSED     VASCULAR:  -Dorsalis pedis pulse +2/4 b/l  -Posterior tibial pulse +2/4 b/l  -Capillary refill time < 3 seconds to b/l hallux  -Hair growth Present to b/l anterior legs and ankles     NEURO:  -Light touch sensation intact to b/l plantar forefoot     DERM:  -Skin along incision sites on dorsal LEFT 2nd MTPJ is well approximated with no dehiscence  ---Moderate edema to LEFT 2nd digit  ---Minimal erythema   ---No dark or ascending erythema, no moderate calor, no malodor, no purulence, no other SOI.    -Toenails normotrophic x 5 and painted with red nail polish     MSK:  -No pain along incision site over LEFT 2nd MTPJ and no plantar pain  -LEFT 2nd digit is stiff with limited ROM    MRI LEFT FOOT 10/31/2018  IMPRESSION:   1. 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  2. Mild to moderate degenerative change in the first metatarsal  phalangeal joint.     YARELY OLIVIER MD     RADIOGRAPHS LEFT FOOT 04/09/2019  IMPRESSION: Postoperative changes are distal second metatarsal       BENSON COSTA MD    RADIOGRAPHS 05/08/2019  FINDINGS:  There are 2 metallic fixation device is seen in the distal  second metatarsal. These are stable and unchanged from previous  examination on April 9. The remainder the foot appears intact                                                                       IMPRESSION: No change in the  appearance of the foot from previous  examination on April 9, 2019      RADIOGRAPHS 6/11/2019  Impression:  Postoperative changes at the second metatarsophalangeal joint with a  small calcifications suggesting a small bone spur extending proximally  and laterally from the second metatarsal head. This finding is only  seen on one of the 3 projections and is unclear if it describes a lies  dorsally or along the plantar aspect of the foot. CT scan may be  helpful. Artifact arising from the metallic screws however may limit  evaluation.      Subtle linear lucency now seen along the lateral margin of the second  digit proximal phalanx which may represent a healing fracture or  perhaps a healing longitudinal osteotomy defect.     CHERI PARRA MD  ============================================================     ASSESSMENT:  (M92.72) Osteochondrosis of head of second metatarsal bone of left foot  (primary encounter diagnosis)     (S99.922D) Plantar plate injury, left, subsequent encounter     (M20.42) Hammer toe of left foot       PLAN:  -Patient evaluated and examined. Treatment options discussed with no educational barriers noted.  -Reviewed radiographs  -Orthotic referral for CMO with met pad and offloading for LEFT 2nd met head or a solid OTC insert for additional foot support. Patient educated on slowly transitioning into the inserts. The patient may call the orthotist to make adjustments if the inserts are not comfortable after consistently wearing them for 4-6 weeks.  -Patient declined PT. She will work on ROM exercises on her own at home. Explained to patient that her toe is likely stiff and she has the small proximal phalanx fracture from a stress region of the bone from the fixation of the plantar plate through her proximal phalanx. It causes her minimal pain.   -Continue compression socks  ---Patient may try a light compression to the toe directly with care taken not to create a tourniquet around the  toe  -Patient in agreement with the above treatment plan and all of patient's questions were answered.        RTC as needed      Greta Pacheco DPM   Yes

## 2024-02-05 ENCOUNTER — OFFICE VISIT (OUTPATIENT)
Dept: DERMATOLOGY | Facility: OTHER | Age: 59
End: 2024-02-05
Attending: DERMATOLOGY
Payer: COMMERCIAL

## 2024-02-05 VITALS — OXYGEN SATURATION: 98 % | HEART RATE: 81 BPM | SYSTOLIC BLOOD PRESSURE: 132 MMHG | DIASTOLIC BLOOD PRESSURE: 84 MMHG

## 2024-02-05 DIAGNOSIS — C44.712 BASAL CELL CARCINOMA, LEG, RIGHT: ICD-10-CM

## 2024-02-05 DIAGNOSIS — L57.0 ACTINIC KERATOSIS: Primary | ICD-10-CM

## 2024-02-05 DIAGNOSIS — L98.9 SKIN LESION: ICD-10-CM

## 2024-02-05 PROCEDURE — 88305 TISSUE EXAM BY PATHOLOGIST: CPT | Mod: 26 | Performed by: PATHOLOGY

## 2024-02-05 PROCEDURE — 17260 DSTRJ MAL LES T/A/L 0.5 CM/<: CPT | Mod: 59 | Performed by: DERMATOLOGY

## 2024-02-05 PROCEDURE — 88305 TISSUE EXAM BY PATHOLOGIST: CPT | Mod: TC | Performed by: DERMATOLOGY

## 2024-02-05 PROCEDURE — 17261 DSTRJ MAL LES T/A/L .6-1.0CM: CPT | Performed by: DERMATOLOGY

## 2024-02-05 RX ORDER — FLUOROURACIL 50 MG/G
CREAM TOPICAL
Qty: 40 G | Refills: 1 | Status: SHIPPED | OUTPATIENT
Start: 2024-02-05

## 2024-02-05 ASSESSMENT — PAIN SCALES - GENERAL: PAINLEVEL: NO PAIN (0)

## 2024-02-05 NOTE — LETTER
"2/5/2024       RE: Patience Mireles  [address]  [facility] [address]     Dear Colleague,    Thank you for referring your patient, Patience Mireles, to the [facility]. Please see a copy of my visit note below.    Jacey has noted two lesions on her anterior thighs that appear abnormal. . .    O: On the right thigh anterior is a 1 cm reddish macule very slightly palpable. A few telangiectatic vessels present. No pigment.       On the left thigh is a 2 mm similar lesion that also has been \" active\". It is red but very non-specific.    A: possible superficial basal cell lesions.    P: Gave options of Efudex trial or shave removal and biopsy. She chose biopsy.    Under local, both sites shave removed and bases gently curetted. Tolerated well. Specimens to [facility]    Will call with results.     Again, thank you for allowing me to participate in the care of your patient.      Sincerely,    KEVIN Hurst MD    "

## 2024-02-05 NOTE — PROGRESS NOTES
"Jacey has noted two lesions on her anterior thighs that appear abnormal. . .    O: On the right thigh anterior is a 1 cm reddish macule very slightly palpable. A few telangiectatic vessels present. No pigment.       On the left thigh is a 2 mm similar lesion that also has been \" active\". It is red but very non-specific.    A: possible superficial basal cell lesions.    P: Gave options of Efudex trial or shave removal and biopsy. She chose biopsy.    Under local, both sites shave removed and bases gently curetted. Tolerated well. Specimens to [facility]    Will call with results.   "

## 2024-02-07 LAB
PATH REPORT.COMMENTS IMP SPEC: NORMAL
PATH REPORT.FINAL DX SPEC: NORMAL
PATH REPORT.GROSS SPEC: NORMAL
PATH REPORT.MICROSCOPIC SPEC OTHER STN: NORMAL
PATH REPORT.RELEVANT HX SPEC: NORMAL

## 2024-02-17 ENCOUNTER — HEALTH MAINTENANCE LETTER (OUTPATIENT)
Age: 59
End: 2024-02-17

## 2024-03-28 ENCOUNTER — TELEPHONE (OUTPATIENT)
Dept: FAMILY MEDICINE | Facility: OTHER | Age: 59
End: 2024-03-28

## 2024-03-28 ENCOUNTER — OFFICE VISIT (OUTPATIENT)
Dept: FAMILY MEDICINE | Facility: OTHER | Age: 59
End: 2024-03-28
Attending: PHYSICIAN ASSISTANT
Payer: COMMERCIAL

## 2024-03-28 VITALS
HEIGHT: 66 IN | WEIGHT: 180 LBS | OXYGEN SATURATION: 99 % | SYSTOLIC BLOOD PRESSURE: 122 MMHG | TEMPERATURE: 98.1 F | DIASTOLIC BLOOD PRESSURE: 80 MMHG | HEART RATE: 73 BPM | BODY MASS INDEX: 28.93 KG/M2 | RESPIRATION RATE: 16 BRPM

## 2024-03-28 DIAGNOSIS — Z78.0 MENOPAUSE: ICD-10-CM

## 2024-03-28 DIAGNOSIS — Z01.419 WELL WOMAN EXAM: Primary | ICD-10-CM

## 2024-03-28 PROCEDURE — 90471 IMMUNIZATION ADMIN: CPT | Performed by: PHYSICIAN ASSISTANT

## 2024-03-28 PROCEDURE — 99396 PREV VISIT EST AGE 40-64: CPT | Mod: 25 | Performed by: PHYSICIAN ASSISTANT

## 2024-03-28 PROCEDURE — 90750 HZV VACC RECOMBINANT IM: CPT | Performed by: PHYSICIAN ASSISTANT

## 2024-03-28 RX ORDER — ESTRADIOL 0.1 MG/D
PATCH TRANSDERMAL
Qty: 12 PATCH | Refills: 4 | Status: SHIPPED | OUTPATIENT
Start: 2024-03-28

## 2024-03-28 SDOH — HEALTH STABILITY: PHYSICAL HEALTH: ON AVERAGE, HOW MANY DAYS PER WEEK DO YOU ENGAGE IN MODERATE TO STRENUOUS EXERCISE (LIKE A BRISK WALK)?: 5 DAYS

## 2024-03-28 ASSESSMENT — PAIN SCALES - GENERAL: PAINLEVEL: MILD PAIN (2)

## 2024-03-28 ASSESSMENT — SOCIAL DETERMINANTS OF HEALTH (SDOH): HOW OFTEN DO YOU GET TOGETHER WITH FRIENDS OR RELATIVES?: ONCE A WEEK

## 2024-03-28 NOTE — TELEPHONE ENCOUNTER
From: Donzetta Heimlich. To: Lane Gonsalves MD  Sent: 2023 5:10 PM CDT  Subject: Lab Orders    I didn't realize I was supposed to have labs done last year. The orders are . Should I have the labs done before my  appointment? CBC With Differential With Platelet  Comp Metabolic Panel (14)  Lipid Panel  TSH W Reflex To Free T4    Thank you! Lvm for pt; wondering if pt can come at 345 for a 4pm appt today 3/28. If pt calls back please move appt to 4pm spot and block providers 430.

## 2024-03-28 NOTE — PROGRESS NOTES
"Preventive Care Visit  [facility]  CAROLINA Goldberg, Family Medicine  Mar 28, 2024      Assessment & Plan     Well woman exam  She is doing well , she is due for mammogram in June. She will schedule. Did have ultrasound last year and this was negative. No bx. Will do shingles number 2 and TDap with her next nurse only visit in May or June.  She is not needing DEXA as she is on HRT and her mood is good.  Tolerating her medication well. Seeing Dermatology and this is going well with her.     Menopause  Refill is given.   - estradiol (CLIMARA) 0.1 MG/24HR weekly patch; PLACE 1 PATCH ON THE SKIN ONCE A WEEK    Patient has been advised of split billing requirements and indicates understanding: No  Review of external notes as documented elsewhere in note  Ordering of each unique test  Prescription drug management  15  minutes spent by me on the date of the encounter doing chart review, history and exam, documentation and further activities per the note      BMI  Estimated body mass index is 29.5 kg/m  as calculated from the following:    Height as of this encounter: 1.664 m (5' 5.5\").    Weight as of this encounter: 81.6 kg (180 lb).       Counseling  Appropriate preventive services were discussed with this patient, including applicable screening as appropriate for fall prevention, nutrition, physical activity, Tobacco-use cessation, weight loss and cognition.  Checklist reviewing preventive services available has been given to the patient.  Reviewed patient's diet, addressing concerns and/or questions.   She is at risk for psychosocial distress and has been provided with information to reduce risk.       See Patient Instructions    No follow-ups on file.    Thelma Morin is a 58 year old, presenting for the following:  Physical        3/28/2024     4:05 PM   Additional Questions   Roomed by eli scott lpn   Accompanied by self         3/28/2024     4:05 PM   Patient Reported Additional " Medications   Patient reports taking the following new medications none        Health Care Directive  Patient has a Health Care Directive on file  Advance care planning document is on file and is current.    HPI  HRT therapy.           3/28/2024   General Health   How would you rate your overall physical health? Good   Feel stress (tense, anxious, or unable to sleep) Only a little   (!) STRESS CONCERN      3/28/2024   Nutrition   Three or more servings of calcium each day? (!) NO   Diet: Regular (no restrictions)   How many servings of fruit and vegetables per day? (!) 2-3   How many sweetened beverages each day? 0-1         3/28/2024   Exercise   Days per week of moderate/strenous exercise 5 days         3/28/2024   Social Factors   Frequency of gathering with friends or relatives Once a week   Worry food won't last until get money to buy more No   Food not last or not have enough money for food? No   Do you have housing?  Yes   Are you worried about losing your housing? No   Lack of transportation? No   Unable to get utilities (heat,electricity)? No         6/1/2022   Fall Risk   Fallen 2 or more times in the past year? No          3/28/2024   Dental   Dentist two times every year? Yes         3/28/2024   TB Screening   Were you born outside of the [place]? No           Today's PHQ-2 Score:       6/1/2022     8:00 AM   PHQ-2 ( 1999 Pf[employer])   Q1: Little interest or pleasure in doing things 0   Q2: Feeling down, depressed or hopeless 0   PHQ-2 Score 0         3/28/2024   Substance Use   Alcohol more than 3/day or more than 7/wk No   Do you use any other substances recreationally? No     Social History     Tobacco Use    Smoking status: Former     Types: Cigarettes    Smokeless tobacco: Never   Vaping Use    Vaping Use: Never used   Substance Use Topics    Alcohol use: Yes     Alcohol/week: 0.0 standard drinks of alcohol             3/28/2024   Breast Cancer Screening   Family history of breast, colon, or ovarian cancer?  No / Unknown         6/3/2022   LAST FHS-7 RESULTS   1st degree relative breast or ovarian cancer No   Any relative bilateral breast cancer No   Any male have breast cancer No   Any ONE woman have BOTH breast AND ovarian cancer No   Any woman with breast cancer before 50yrs No   2 or more relatives with breast AND/OR ovarian cancer No   2 or more relatives with breast AND/OR bowel cancer No        Mammogram Screening - Mammogram every 1-2 years updated in Health Maintenance based on mutual decision making          3/28/2024   One time HIV Screening   Previous HIV test? I don't know         3/28/2024   STI Screening   New sexual partner(s) since last STI/HIV test? No     History of abnormal Pap smear: Last 3 Pap and HPV Results:         ASCVD Risk   The 10-year ASCVD risk score (Quyen CUMMINS, et al., 2019) is: 2.2%    Values used to calculate the score:      Age: 58 years      Sex: Female      Is Non- : No      Diabetic: No      Tobacco smoker: No      Systolic Blood Pressure: 122 mmHg      Is BP treated: No      HDL Cholesterol: 82 mg/dL      Total Cholesterol: 218 mg/dL    Fracture Risk Assessment Tool  Link to Frax Calculator  Use the information below to complete the Frax calculator  : 1965  Sex: female  Weight (kg): 81.6 kg (actual weight)  Height (cm): 166.4 cm  Previous Fragility Fracture:  No  History of parent with fractured hip:  No  Current Smoking:  No  Patient has been on glucocorticoids for more than 3 months (5mg/day or more): No  Rheumatoid Arthritis on Problem List:  No  Secondary Osteoporosis on Problem List:  No  Consumes 3 or more units of alcohol per day: No  Femoral Neck BMD (g/cm2)           Reviewed and updated as needed this visit by Provider                    Past Medical History:   Diagnosis Date    Asymptomatic varicose veins 2003    Carcinoma in situ of cervix uteri 2000    Chest pain, unspecified 2004    Dysmenorrhea 2000     Endometriosis of uterus 2000    Endometriosis, site unspecified 10/26/2000    Follow-up examination, following unspecified surgery 2001    Leukorrhea, not specified as infective 2007    Need for prophylactic hormone replacement therapy (postmenopausal)     Need for prophylactic vaccination and inoculation against viral hepatitis 2009    Nonallopathic lesion of cervical region, not elsewhere classified 2001    Other screening mammogram 2000    PONV (postoperative nausea and vomiting)     Routine general medical examination at a health care facility 2001     Past Surgical History:   Procedure Laterality Date    BUNIONECTOMY Left 2019    Procedure: LEFT FOOT SECOND DIGIT WEIL OSTEOTOMY WITH POSSIBLE PLANTAR PLATE REPAIR;  Surgeon: Greta Pacheco DPM;  [facility]    COLONOSCOPY N/A 2014    Procedure: COLONOSCOPY;  Surgeon: Quincy Fontanez MD;  [facility]    ESOPHAGOSCOPY, GASTROSCOPY, DUODENOSCOPY (EGD), COMBINED  2013    Procedure: COMBINED ESOPHAGOSCOPY, GASTROSCOPY, DUODENOSCOPY (EGD);   ESOPHAGOGASTRODUODENOSCOPY WITH BIOPSIES;  Surgeon: Quincy Fontanez MD;  [facility]    ESOPHAGOSCOPY, GASTROSCOPY, DUODENOSCOPY (EGD), COMBINED N/A 2020    Procedure: ESOPHAGOGASTRODUODENOSCOPY, WITH BIOPSY;  Surgeon: Damon Ruby MD;  Location: [facility]    HYSTERECTOMY TOTAL ABDOMINAL, BILATERAL SALPINGO-OOPHORECTOMY, COMBINED N/A 2004    REMOVE HARDWARE FOOT Left 2021    Procedure: Left 2nd Metatarsal Head Resection, Hardware Removal;  Surgeon: Mikhail Duncan DPM;  Location: [facility]     OB History    Para Term  AB Living   4 4 2 0 0 2   SAB IAB Ectopic Multiple Live Births   0 0 0 0 2      # Outcome Date GA Lbr Anant/2nd Weight Sex Delivery Anes PTL Lv   4 Para 89    M Vag-Spont   [facility]   3 Term            2 Term            1 Para     F Vag-Spont   [facility]     BP Readings from Last 3 Encounters:   24 122/80    02/05/24 132/84   06/01/22 130/70    Wt Readings from Last 3 Encounters:   03/28/24 81.6 kg (180 lb)   06/01/22 82.6 kg (182 lb)   06/22/21 82.6 kg (182 lb)                  Patient Active Problem List   Diagnosis    Menopause    Well woman exam    Status post WERNER-BSO    Agatston coronary artery calcium score less than 100     Past Surgical History:   Procedure Laterality Date    BUNIONECTOMY Left 4/1/2019    Procedure: LEFT FOOT SECOND DIGIT WEIL OSTEOTOMY WITH POSSIBLE PLANTAR PLATE REPAIR;  Surgeon: Greta Pacheco DPM;  [facility]    COLONOSCOPY N/A 11/6/2014    Procedure: COLONOSCOPY;  Surgeon: Quincy Fontanez MD;  [facility]    ESOPHAGOSCOPY, GASTROSCOPY, DUODENOSCOPY (EGD), COMBINED  8/9/2013    Procedure: COMBINED ESOPHAGOSCOPY, GASTROSCOPY, DUODENOSCOPY (EGD);   ESOPHAGOGASTRODUODENOSCOPY WITH BIOPSIES;  Surgeon: Quincy Fontanez MD;  [facility]    ESOPHAGOSCOPY, GASTROSCOPY, DUODENOSCOPY (EGD), COMBINED N/A 9/29/2020    Procedure: ESOPHAGOGASTRODUODENOSCOPY, WITH BIOPSY;  Surgeon: Damon Ruby MD;  [facility]    HYSTERECTOMY TOTAL ABDOMINAL, BILATERAL SALPINGO-OOPHORECTOMY, COMBINED N/A 01/01/2004    REMOVE HARDWARE FOOT Left 5/17/2021    Procedure: Left 2nd Metatarsal Head Resection, Hardware Removal;  Surgeon: Mikhail Duncan DPM;  Location: [facility]       Social History     Tobacco Use    Smoking status: Former     Types: Cigarettes    Smokeless tobacco: Never   Substance Use Topics    Alcohol use: Yes     Alcohol/week: 0.0 standard drinks of alcohol     Family History   Problem Relation Age of Onset    Heart Disease Mother 68        MI; cause of death    C.A.D. Father     Diabetes Father     Hypertension Father     Osteoarthritis Father          Current Outpatient Medications   Medication Sig Dispense Refill    estradiol (CLIMARA) 0.1 MG/24HR weekly patch PLACE 1 PATCH ON THE SKIN ONCE A WEEK 12 patch 4    fluorouracil (EFUDEX) 5 % external cream Apply to red  suspicious lesions for 14 days 40 g 1    linaclotide (LINZESS) 145 MCG capsule Take 1 capsule (145 mcg) by mouth every morning (before breakfast) (Patient taking differently: Take 145 mcg by mouth daily as needed) 30 capsule 3    loratadine (CLARITIN) 10 MG tablet Take 10 mg by mouth as needed       valACYclovir (VALTREX) 1000 mg tablet TAKE 2 TABLETS BY MOUTH 2 TIMES A DAY 8 tablet 3     No Known Allergies  Recent Labs   Lab Test 06/01/22  0902 06/25/21  0801 05/03/21  1042 09/21/20  1551 08/24/17  1240 03/11/16  1426 03/11/16  1426 03/11/16  0924   A1C  --   --   --   --   --   --   --  5.2   LDL  --  126*  --   --   --   --   --  97   HDL  --  82  --   --   --   --   --  78   TRIG  --  51  --   --   --   --   --  44   ALT 19  --   --   --  27  --  17  --    CR 0.70  --  0.69 0.79 0.65   < > 0.70  --    GFRESTIMATED >90  --  >90 83 >90   < > 88  --    GFRESTBLACK  --   --  >90 >90 >90   < > >90  African American GFR Calc    --    POTASSIUM 3.6  --  3.8 4.4 3.9   < > 3.9  --    TSH  --   --   --   --   --   --   --  1.60    < > = values in this interval not displayed.          Review of Systems  CONSTITUTIONAL: NEGATIVE for fever, chills, change in weight  INTEGUMENTARY/SKIN: NEGATIVE for worrisome rashes, moles or lesions  EYES: NEGATIVE for vision changes or irritation  ENT/MOUTH: NEGATIVE for ear, mouth and throat problems  RESP: NEGATIVE for significant cough or SOB  BREAST: NEGATIVE for masses, tenderness or discharge  CV: NEGATIVE for chest pain, palpitations or peripheral edema  GI: NEGATIVE for nausea, abdominal pain, heartburn, or change in bowel habits  : NEGATIVE for frequency, dysuria, or hematuria  MUSCULOSKELETAL: NEGATIVE for significant arthralgias or myalgia  NEURO: NEGATIVE for weakness, dizziness or paresthesias  ENDOCRINE: NEGATIVE for temperature intolerance, skin/hair changes  HEME: NEGATIVE for bleeding problems  PSYCHIATRIC: NEGATIVE for changes in mood or affect     Objective    Exam  BP  "122/80 (BP Location: Left arm, Patient Position: Sitting, Cuff Size: Adult Regular)   Pulse 73   Temp 98.1  F (36.7  C) (Tympanic)   Resp 16   Ht 1.664 m (5' 5.5\")   Wt 81.6 kg (180 lb)   SpO2 99%   BMI 29.50 kg/m     Estimated body mass index is 29.5 kg/m  as calculated from the following:    Height as of this encounter: 1.664 m (5' 5.5\").    Weight as of this encounter: 81.6 kg (180 lb).    Physical Exam  GENERAL: alert and no distress  EYES: Eyes grossly normal to inspection, PERRL and conjunctivae and sclerae normal  HENT: ear canals and TM's normal, nose and mouth without ulcers or lesions  NECK: no adenopathy, no asymmetry, masses, or scars  RESP: lungs clear to auscultation - no rales, rhonchi or wheezes  CV: regular rate and rhythm, normal S1 S2, no S3 or S4, no murmur, click or rub, no peripheral edema  ABDOMEN: soft, nontender, no hepatosplenomegaly, no masses and bowel sounds normal  MS: no gross musculoskeletal defects noted, no edema  SKIN: no suspicious lesions or rashes  NEURO: Normal strength and tone, mentation intact and speech normal  PSYCH: mentation appears normal, affect normal/bright  LYMPH: no cervical, supraclavicular, axillary, or inguinal adenopathy        Signed Electronically by: CAROLINA Goldberg    "

## 2024-04-23 ENCOUNTER — MYC MEDICAL ADVICE (OUTPATIENT)
Dept: FAMILY MEDICINE | Facility: OTHER | Age: 59
End: 2024-04-23

## 2024-04-23 DIAGNOSIS — K29.01 OTHER ACUTE GASTRITIS WITH HEMORRHAGE: Primary | ICD-10-CM

## 2024-04-24 RX ORDER — SUCRALFATE 1 G/1
1 TABLET ORAL 4 TIMES DAILY
Qty: 120 TABLET | Refills: 3 | Status: SHIPPED | OUTPATIENT
Start: 2024-04-24 | End: 2024-08-26

## 2024-04-24 NOTE — TELEPHONE ENCOUNTER
Pt was on sucralfate 1 gm tablet PO QID back in 2013 prescribed by Dr Fontanez after surgery (esophagogastroduodenoscopy with biopsies).      LOV 03/28/2024    Appointment?

## 2024-05-29 ENCOUNTER — MYC MEDICAL ADVICE (OUTPATIENT)
Dept: FAMILY MEDICINE | Facility: OTHER | Age: 59
End: 2024-05-29

## 2024-05-30 DIAGNOSIS — B00.2 ORAL HERPES: ICD-10-CM

## 2024-05-30 NOTE — TELEPHONE ENCOUNTER
Pt is scheduled with PCP on 08/13/2024.     Pt reports that she would like to come to the root cause and would like to see if there is some allergy testing for lactose and gluten allergies.    Pt wondering if she should have another endoscopy and colonoscopy?  Or maybe an ultra sound?     Pt is coming in for labs on 08/13/2024 to do the labs prior to appointment

## 2024-05-30 NOTE — TELEPHONE ENCOUNTER
Valacyclovir 1000 mg tab      Last Written Prescription Date:  1-6-23  Last Fill Quantity: 8,   # refills: 3  Last Office Visit: 3-28-24  Future Office visit:

## 2024-06-02 RX ORDER — VALACYCLOVIR HYDROCHLORIDE 1 G/1
TABLET, FILM COATED ORAL
Qty: 8 TABLET | Refills: 0 | Status: SHIPPED | OUTPATIENT
Start: 2024-06-02

## 2024-06-13 ENCOUNTER — LAB (OUTPATIENT)
Dept: LAB | Facility: OTHER | Age: 59
End: 2024-06-13
Attending: PHYSICIAN ASSISTANT
Payer: COMMERCIAL

## 2024-06-13 ENCOUNTER — OFFICE VISIT (OUTPATIENT)
Dept: FAMILY MEDICINE | Facility: OTHER | Age: 59
End: 2024-06-13
Attending: PHYSICIAN ASSISTANT
Payer: COMMERCIAL

## 2024-06-13 VITALS
OXYGEN SATURATION: 97 % | HEIGHT: 66 IN | HEART RATE: 74 BPM | DIASTOLIC BLOOD PRESSURE: 82 MMHG | RESPIRATION RATE: 16 BRPM | WEIGHT: 179.3 LBS | SYSTOLIC BLOOD PRESSURE: 134 MMHG | TEMPERATURE: 97.5 F | BODY MASS INDEX: 28.82 KG/M2

## 2024-06-13 DIAGNOSIS — Z78.0 MENOPAUSE: ICD-10-CM

## 2024-06-13 DIAGNOSIS — E78.5 HYPERLIPIDEMIA LDL GOAL <100: ICD-10-CM

## 2024-06-13 DIAGNOSIS — K29.00 ACUTE SUPERFICIAL GASTRITIS WITHOUT HEMORRHAGE: ICD-10-CM

## 2024-06-13 DIAGNOSIS — K29.00 ACUTE SUPERFICIAL GASTRITIS WITHOUT HEMORRHAGE: Primary | ICD-10-CM

## 2024-06-13 DIAGNOSIS — Z01.419 WELL WOMAN EXAM: ICD-10-CM

## 2024-06-13 LAB
ALBUMIN SERPL BCG-MCNC: 4.2 G/DL (ref 3.5–5.2)
ALBUMIN UR-MCNC: NEGATIVE MG/DL
ALP SERPL-CCNC: 52 U/L (ref 40–150)
ALT SERPL W P-5'-P-CCNC: 13 U/L (ref 0–50)
AMYLASE SERPL-CCNC: 52 U/L (ref 28–100)
ANION GAP SERPL CALCULATED.3IONS-SCNC: 9 MMOL/L (ref 7–15)
APPEARANCE UR: CLEAR
AST SERPL W P-5'-P-CCNC: 18 U/L (ref 0–45)
BASOPHILS # BLD AUTO: 0.1 10E3/UL (ref 0–0.2)
BASOPHILS NFR BLD AUTO: 1 %
BILIRUB SERPL-MCNC: 0.4 MG/DL
BILIRUB UR QL STRIP: NEGATIVE
BUN SERPL-MCNC: 11 MG/DL (ref 8–23)
CALCIUM SERPL-MCNC: 9 MG/DL (ref 8.8–10.2)
CHLORIDE SERPL-SCNC: 105 MMOL/L (ref 98–107)
CHOLEST SERPL-MCNC: 199 MG/DL
COLOR UR AUTO: ABNORMAL
CREAT SERPL-MCNC: 0.7 MG/DL (ref 0.51–0.95)
DEPRECATED HCO3 PLAS-SCNC: 26 MMOL/L (ref 22–29)
EGFRCR SERPLBLD CKD-EPI 2021: >90 ML/MIN/1.73M2
EOSINOPHIL # BLD AUTO: 0.1 10E3/UL (ref 0–0.7)
EOSINOPHIL NFR BLD AUTO: 1 %
ERYTHROCYTE [DISTWIDTH] IN BLOOD BY AUTOMATED COUNT: 12.6 % (ref 10–15)
FASTING STATUS PATIENT QL REPORTED: YES
FASTING STATUS PATIENT QL REPORTED: YES
GLUCOSE SERPL-MCNC: 88 MG/DL (ref 70–99)
GLUCOSE UR STRIP-MCNC: NEGATIVE MG/DL
HCT VFR BLD AUTO: 38.1 % (ref 35–47)
HDLC SERPL-MCNC: 75 MG/DL
HGB BLD-MCNC: 12.7 G/DL (ref 11.7–15.7)
HGB UR QL STRIP: NEGATIVE
IMM GRANULOCYTES # BLD: 0 10E3/UL
IMM GRANULOCYTES NFR BLD: 0 %
KETONES UR STRIP-MCNC: NEGATIVE MG/DL
LDLC SERPL CALC-MCNC: 114 MG/DL
LEUKOCYTE ESTERASE UR QL STRIP: NEGATIVE
LIPASE SERPL-CCNC: 25 U/L (ref 13–60)
LYMPHOCYTES # BLD AUTO: 2.5 10E3/UL (ref 0.8–5.3)
LYMPHOCYTES NFR BLD AUTO: 38 %
MCH RBC QN AUTO: 30.6 PG (ref 26.5–33)
MCHC RBC AUTO-ENTMCNC: 33.3 G/DL (ref 31.5–36.5)
MCV RBC AUTO: 92 FL (ref 78–100)
MONOCYTES # BLD AUTO: 0.7 10E3/UL (ref 0–1.3)
MONOCYTES NFR BLD AUTO: 11 %
MUCOUS THREADS #/AREA URNS LPF: PRESENT /LPF
NEUTROPHILS # BLD AUTO: 3.1 10E3/UL (ref 1.6–8.3)
NEUTROPHILS NFR BLD AUTO: 48 %
NITRATE UR QL: NEGATIVE
NONHDLC SERPL-MCNC: 124 MG/DL
NRBC # BLD AUTO: 0 10E3/UL
NRBC BLD AUTO-RTO: 0 /100
PH UR STRIP: 6 [PH] (ref 4.7–8)
PLATELET # BLD AUTO: 306 10E3/UL (ref 150–450)
POTASSIUM SERPL-SCNC: 4 MMOL/L (ref 3.4–5.3)
PROT SERPL-MCNC: 6.9 G/DL (ref 6.4–8.3)
RBC # BLD AUTO: 4.15 10E6/UL (ref 3.8–5.2)
RBC URINE: <1 /HPF
SODIUM SERPL-SCNC: 140 MMOL/L (ref 135–145)
SP GR UR STRIP: 1.02 (ref 1–1.03)
SQUAMOUS EPITHELIAL: 2 /HPF
TRIGL SERPL-MCNC: 50 MG/DL
TSH SERPL DL<=0.005 MIU/L-ACNC: 2.42 UIU/ML (ref 0.3–4.2)
UROBILINOGEN UR STRIP-MCNC: NORMAL MG/DL
WBC # BLD AUTO: 6.4 10E3/UL (ref 4–11)
WBC URINE: 1 /HPF

## 2024-06-13 PROCEDURE — 82306 VITAMIN D 25 HYDROXY: CPT

## 2024-06-13 PROCEDURE — 99214 OFFICE O/P EST MOD 30 MIN: CPT | Performed by: PHYSICIAN ASSISTANT

## 2024-06-13 PROCEDURE — 80061 LIPID PANEL: CPT

## 2024-06-13 PROCEDURE — 80050 GENERAL HEALTH PANEL: CPT

## 2024-06-13 PROCEDURE — 81001 URINALYSIS AUTO W/SCOPE: CPT

## 2024-06-13 PROCEDURE — 36415 COLL VENOUS BLD VENIPUNCTURE: CPT

## 2024-06-13 PROCEDURE — 83690 ASSAY OF LIPASE: CPT

## 2024-06-13 PROCEDURE — 82150 ASSAY OF AMYLASE: CPT

## 2024-06-13 RX ORDER — OMEPRAZOLE 40 MG/1
40 CAPSULE, DELAYED RELEASE ORAL DAILY
Qty: 90 CAPSULE | Refills: 1 | Status: SHIPPED | OUTPATIENT
Start: 2024-06-13

## 2024-06-13 ASSESSMENT — ANXIETY QUESTIONNAIRES
8. IF YOU CHECKED OFF ANY PROBLEMS, HOW DIFFICULT HAVE THESE MADE IT FOR YOU TO DO YOUR WORK, TAKE CARE OF THINGS AT HOME, OR GET ALONG WITH OTHER PEOPLE?: NOT DIFFICULT AT ALL
6. BECOMING EASILY ANNOYED OR IRRITABLE: NOT AT ALL
5. BEING SO RESTLESS THAT IT IS HARD TO SIT STILL: NOT AT ALL
3. WORRYING TOO MUCH ABOUT DIFFERENT THINGS: NOT AT ALL
GAD7 TOTAL SCORE: 0
2. NOT BEING ABLE TO STOP OR CONTROL WORRYING: NOT AT ALL
GAD7 TOTAL SCORE: 0
4. TROUBLE RELAXING: NOT AT ALL
1. FEELING NERVOUS, ANXIOUS, OR ON EDGE: NOT AT ALL
7. FEELING AFRAID AS IF SOMETHING AWFUL MIGHT HAPPEN: NOT AT ALL
GAD7 TOTAL SCORE: 0
7. FEELING AFRAID AS IF SOMETHING AWFUL MIGHT HAPPEN: NOT AT ALL
IF YOU CHECKED OFF ANY PROBLEMS ON THIS QUESTIONNAIRE, HOW DIFFICULT HAVE THESE PROBLEMS MADE IT FOR YOU TO DO YOUR WORK, TAKE CARE OF THINGS AT HOME, OR GET ALONG WITH OTHER PEOPLE: NOT DIFFICULT AT ALL

## 2024-06-13 ASSESSMENT — PATIENT HEALTH QUESTIONNAIRE - PHQ9
10. IF YOU CHECKED OFF ANY PROBLEMS, HOW DIFFICULT HAVE THESE PROBLEMS MADE IT FOR YOU TO DO YOUR WORK, TAKE CARE OF THINGS AT HOME, OR GET ALONG WITH OTHER PEOPLE: NOT DIFFICULT AT ALL
SUM OF ALL RESPONSES TO PHQ QUESTIONS 1-9: 0
SUM OF ALL RESPONSES TO PHQ QUESTIONS 1-9: 0

## 2024-06-13 NOTE — PROGRESS NOTES
Assessment & Plan     Well woman exam  She is doing well. Needing her mammogram. Discussion on referral to see someone on . Has so many intolerance. GI upset.      Acute superficial gastritis without hemorrhage  Change back to Prilosec and if not better we can have upper GI .    - omeprazole (PRILOSEC) 40 MG DR capsule; Take 1 capsule (40 mg) by mouth daily  - Lipase; Future  - Amylase; Future    Menopause  Is doing ok. Weight is stable.     Hyperlipidemia LDL goal <100  She is stable. Good .  Control without any types of medication            See Patient Instructions    No follow-ups on file.    Thelma Morin is a 59 year old, presenting for the following health issues:  Abdominal Pain        6/13/2024     8:15 AM   Additional Questions   Roomed by soco rider   Accompanied by none         6/13/2024     8:15 AM   Patient Reported Additional Medications   Patient reports taking the following new medications none     HPI     ABDOMINAL PAIN   Onset: on going  Description:   Character: Burning and Fullness  Location: under rib cage  Radiation: Back  Intensity: mild, moderate  Progression of Symptoms:  always present but stays the same  Accompanying Signs & Symptoms:  Fever/Chills?: no   Gas/Bloating: YES  Nausea: YES  Vomitting: no   Diarrhea?: YES  Constipation:YES  Dysuria or Hematuria: YES- blood in stool History:   Trauma: no   Previous similar pain: YES- has been going on for years   Previous tests done: EGD, Colonoscopy, and Upper Endoscopy  Precipitating factors:   Does the pain change with:     Food: no      BM: no     Urination: no   Alleviating factors:  none  Therapies Tried and outcome: carafate  LMP:  not applicable                Review of Systems  Constitutional, neuro, ENT, endocrine, pulmonary, cardiac, gastrointestinal, genitourinary, musculoskeletal, integument and psychiatric systems are negative, except as otherwise noted.      Objective    /82 (BP Location: Left arm,  "Patient Position: Sitting, Cuff Size: Adult Large)   Pulse 74   Temp 97.5  F (36.4  C) (Tympanic)   Resp 16   Ht 1.664 m (5' 5.5\")   Wt 81.3 kg (179 lb 4.8 oz)   SpO2 97%   BMI 29.38 kg/m    Body mass index is 29.38 kg/m .  Physical Exam   GENERAL: alert and no distress  EYES: Eyes grossly normal to inspection, PERRL and conjunctivae and sclerae normal  HENT: ear canals and TM's normal, nose and mouth without ulcers or lesions  NECK: no adenopathy, no asymmetry, masses, or scars  RESP: lungs clear to auscultation - no rales, rhonchi or wheezes  CV: regular rate and rhythm, normal S1 S2, no S3 or S4, no murmur, click or rub, no peripheral edema  ABDOMEN: soft, nontender, no hepatosplenomegaly, no masses and bowel sounds normal  MS: no gross musculoskeletal defects noted, no edema  SKIN: no suspicious lesions or rashes  NEURO: Normal strength and tone, mentation intact and speech normal  PSYCH: mentation appears normal, affect normal/bright  LYMPH: no cervical, supraclavicular, axillary, or inguinal adenopathy    Results for orders placed or performed in visit on 06/13/24   Lipid Profile (Chol, Trig, HDL, LDL calc)     Status: Abnormal   Result Value Ref Range    Cholesterol 199 <200 mg/dL    Triglycerides 50 <150 mg/dL    Direct Measure HDL 75 >=50 mg/dL    LDL Cholesterol Calculated 114 (H) <=100 mg/dL    Non HDL Cholesterol 124 <130 mg/dL    Patient Fasting > 8hrs? Yes     Narrative    Cholesterol  Desirable:  <200 mg/dL    Triglycerides  Normal:  Less than 150 mg/dL  Borderline High:  150-199 mg/dL  High:  200-499 mg/dL  Very High:  Greater than or equal to 500 mg/dL    Direct Measure HDL  Female:  Greater than or equal to 50 mg/dL   Male:  Greater than or equal to 40 mg/dL    LDL Cholesterol  Desirable:  <100mg/dL  Above Desirable:  100-129 mg/dL   Borderline High:  130-159 mg/dL   High:  160-189 mg/dL   Very High:  >= 190 mg/dL    Non HDL Cholesterol  Desirable:  130 mg/dL  Above Desirable:  130-159 " mg/dL  Borderline High:  160-189 mg/dL  High:  190-219 mg/dL  Very High:  Greater than or equal to 220 mg/dL   Comprehensive metabolic panel (BMP + Alb, Alk Phos, ALT, AST, Total. Bili, TP)     Status: None   Result Value Ref Range    Sodium 140 135 - 145 mmol/L    Potassium 4.0 3.4 - 5.3 mmol/L    Carbon Dioxide (CO2) 26 22 - 29 mmol/L    Anion Gap 9 7 - 15 mmol/L    Urea Nitrogen 11.0 8.0 - 23.0 mg/dL    Creatinine 0.70 0.51 - 0.95 mg/dL    GFR Estimate >90 >60 mL/min/1.73m2    Calcium 9.0 8.8 - 10.2 mg/dL    Chloride 105 98 - 107 mmol/L    Glucose 88 70 - 99 mg/dL    Alkaline Phosphatase 52 40 - 150 U/L    AST 18 0 - 45 U/L    ALT 13 0 - 50 U/L    Protein Total 6.9 6.4 - 8.3 g/dL    Albumin 4.2 3.5 - 5.2 g/dL    Bilirubin Total 0.4 <=1.2 mg/dL    Patient Fasting > 8hrs? Yes    TSH with free T4 reflex     Status: Normal   Result Value Ref Range    TSH 2.42 0.30 - 4.20 uIU/mL   UA Macroscopic with reflex to Microscopic and Culture     Status: Abnormal    Specimen: Urine, Clean Catch   Result Value Ref Range    Color Urine Light Yellow Colorless, Straw, Light Yellow, Yellow    Appearance Urine Clear Clear    Glucose Urine Negative Negative mg/dL    Bilirubin Urine Negative Negative    Ketones Urine Negative Negative mg/dL    Specific Gravity Urine 1.021 1.003 - 1.035    Blood Urine Negative Negative    pH Urine 6.0 4.7 - 8.0    Protein Albumin Urine Negative Negative mg/dL    Urobilinogen Urine Normal Normal, 2.0 mg/dL    Nitrite Urine Negative Negative    Leukocyte Esterase Urine Negative Negative    Mucus Urine Present (A) None Seen /LPF    RBC Urine <1 <=2 /HPF    WBC Urine 1 <=5 /HPF    Squamous Epithelials Urine 2 (H) <=1 /HPF    Narrative    Microscopic not indicated  Urine Culture not indicated   CBC with platelets and differential     Status: None   Result Value Ref Range    WBC Count 6.4 4.0 - 11.0 10e3/uL    RBC Count 4.15 3.80 - 5.20 10e6/uL    Hemoglobin 12.7 11.7 - 15.7 g/dL    Hematocrit 38.1 35.0 -  47.0 %    MCV 92 78 - 100 fL    MCH 30.6 26.5 - 33.0 pg    MCHC 33.3 31.5 - 36.5 g/dL    RDW 12.6 10.0 - 15.0 %    Platelet Count 306 150 - 450 10e3/uL    % Neutrophils 48 %    % Lymphocytes 38 %    % Monocytes 11 %    % Eosinophils 1 %    % Basophils 1 %    % Immature Granulocytes 0 %    NRBCs per 100 WBC 0 <1 /100    Absolute Neutrophils 3.1 1.6 - 8.3 10e3/uL    Absolute Lymphocytes 2.5 0.8 - 5.3 10e3/uL    Absolute Monocytes 0.7 0.0 - 1.3 10e3/uL    Absolute Eosinophils 0.1 0.0 - 0.7 10e3/uL    Absolute Basophils 0.1 0.0 - 0.2 10e3/uL    Absolute Immature Granulocytes 0.0 <=0.4 10e3/uL    Absolute NRBCs 0.0 10e3/uL   CBC with platelets and differential     Status: None    Narrative    The following orders were created for panel order CBC with platelets and differential.  Procedure                               Abnormality         Status                     ---------                               -----------         ------                     CBC with platelets and d...[[id number]]                      Final result                 Please view results for these tests on the individual orders.           Signed Electronically by: CAROLINA Goldberg

## 2024-06-14 LAB — VIT D+METAB SERPL-MCNC: 25 NG/ML (ref 20–50)

## 2024-06-20 ENCOUNTER — MYC MEDICAL ADVICE (OUTPATIENT)
Dept: FAMILY MEDICINE | Facility: OTHER | Age: 59
End: 2024-06-20

## 2024-06-20 DIAGNOSIS — T78.1XXA GASTROINTESTINAL FOOD SENSITIVITY: Primary | ICD-10-CM

## 2024-06-21 NOTE — TELEPHONE ENCOUNTER
"6/21/2024 9:16 AM  Writer called patient regarding my chart message. See message.  Patient stated, \"it was discussed in my recent visit she would send me to [facility] Allergy Testing\" writer prepared encounter.     Kaylee WHITTAKER RN, Care Coordinator    "

## 2024-07-24 NOTE — ANESTHESIA PREPROCEDURE EVALUATION
Anesthesia Pre-Procedure Evaluation    Patient: Patience Mireles   MRN: [id number] : 1965          Preoperative Diagnosis: Retained orthopedic hardware [Z96.9]  Arthrosis [M19.90]    Procedure(s):  LEFT SECOND METATARSAL HEAD RESECTION  HARDWARE REMOVAL LEFT SECOND TOE    Past Medical History:   Diagnosis Date     Asymptomatic varicose veins 2003     Carcinoma in situ of cervix uteri 2000     Chest pain, unspecified 2004     Dysmenorrhea 2000     Endometriosis of uterus 2000     Endometriosis, site unspecified 10/26/2000     Follow-up examination, following unspecified surgery 2001     Leukorrhea, not specified as infective 2007     Need for prophylactic vaccination and inoculation against viral hepatitis 2009     Nonallopathic lesion of cervical region, not elsewhere classified 2001     Other screening mammogram 2000     PONV (postoperative nausea and vomiting)      Routine general medical examination at a health care facility 2001     Past Surgical History:   Procedure Laterality Date     BUNIONECTOMY Left 2019    Procedure: LEFT FOOT SECOND DIGIT WEIL OSTEOTOMY WITH POSSIBLE PLANTAR PLATE REPAIR;  Surgeon: Greta Pacheco DPM;  Location: [facility] OR     COLONOSCOPY N/A 2014    Procedure: COLONOSCOPY;  Surgeon: Quincy Fontanez MD;  Location: [facility] OR     ESOPHAGOSCOPY, GASTROSCOPY, DUODENOSCOPY (EGD), COMBINED  2013    Procedure: COMBINED ESOPHAGOSCOPY, GASTROSCOPY, DUODENOSCOPY (EGD);   ESOPHAGOGASTRODUODENOSCOPY WITH BIOPSIES;  Surgeon: Quincy Fontanez MD;  Location: [facility] OR     ESOPHAGOSCOPY, GASTROSCOPY, DUODENOSCOPY (EGD), COMBINED N/A 2020    Procedure: ESOPHAGOGASTRODUODENOSCOPY, WITH BIOPSY;  Surgeon: Damon Ruby MD;  Location: [facility] OR     HYSTERECTOMY TOTAL ABDOMINAL, BILATERAL SALPINGO-OOPHORECTOMY, COMBINED N/A 2004       Anesthesia Evaluation     . Pt has had prior anesthetic.     History of anesthetic  "complications   - PONV        ROS/MED HX    ENT/Pulmonary:     (+)tobacco use, Past use , . .    Neurologic:       Cardiovascular: Comment: varicose veins  chest pain, unspecified    (+) ----. : . . . :. . Previous cardiac testing date:results:date: results:ECG reviewed date:9/21/2020 results:NSR @61 date: results:          METS/Exercise Tolerance:     Hematologic:         Musculoskeletal:   (+)  other musculoskeletal- retained orthopedic hardware      GI/Hepatic:         Renal/Genitourinary:         Endo:         Psychiatric:         Infectious Disease:         Malignancy:         Other:                                 Lab Results   Component Value Date    WBC 8.5 09/21/2020    HGB 13.3 09/21/2020    HCT 39.9 09/21/2020     09/21/2020    CRP <2.9 08/24/2017    SED 9 08/24/2017     09/21/2020    POTASSIUM 4.4 09/21/2020    CHLORIDE 108 09/21/2020    CO2 28 09/21/2020    BUN 16 09/21/2020    CR 0.79 09/21/2020    GLC 96 09/21/2020    RENETTA 9.1 09/21/2020    ALBUMIN 3.9 08/24/2017    PROTTOTAL 7.5 08/24/2017    ALT 27 08/24/2017    AST 19 08/24/2017    ALKPHOS 63 08/24/2017    BILITOTAL 0.4 08/24/2017    LIPASE 141 08/24/2017    AMYLASE 51 08/24/2017    TSH 1.60 03/11/2016       Preop Vitals  BP Readings from Last 3 Encounters:   09/29/20 (!) 152/101   09/21/20 120/74   08/20/20 122/82    Pulse Readings from Last 3 Encounters:   09/29/20 63   09/21/20 72   08/20/20 68      Resp Readings from Last 3 Encounters:   09/29/20 10   08/20/20 16   04/25/19 16    SpO2 Readings from Last 3 Encounters:   09/29/20 98%   09/21/20 99%   05/08/19 99%      Temp Readings from Last 1 Encounters:   09/29/20 97.5  F (36.4  C) (Tympanic)    Ht Readings from Last 1 Encounters:   09/29/20 1.676 m (5' 6\")      Wt Readings from Last 1 Encounters:   09/29/20 83 kg (183 lb)    Estimated body mass index is 29.54 kg/m  as calculated from the following:    Height as of 9/29/20: 1.676 m (5' 6\").    Weight as of 9/29/20: 83 kg (183 lb). "       Anesthesia Plan      History & Physical Review      ASA Status:  2 .        Plan for MAC and Peripheral Nerve Block Reason for MAC:  Deep or markedly invasive procedure (G8)    H&P 9/21  COVID results:         Postoperative Care      Consents                 PHOEBE Loyola CRNA   [NL] : warm, clear [de-identified] :  right sole of foot with erythematous, flat area. Pt reports pain with palpation; no visible foreign body.  No surrounding edema.  No fluid filled blister

## 2024-08-26 DIAGNOSIS — K29.01 OTHER ACUTE GASTRITIS WITH HEMORRHAGE: ICD-10-CM

## 2024-08-26 RX ORDER — SUCRALFATE 1 G/1
1 TABLET ORAL 4 TIMES DAILY
Qty: 120 TABLET | Refills: 0 | Status: SHIPPED | OUTPATIENT
Start: 2024-08-26

## 2024-08-26 NOTE — TELEPHONE ENCOUNTER
SUCRALFATE 1 GM TABLET       Last Written Prescription Date:  04/24/2024  Last Fill Quantity: 120,   # refills: 3  Last Office Visit: 03/28/2024  Future Office visit:       Routing refill request to provider for review/approval because:  Miscellaneous Gastrointestinal Agents Aiydwu1008/26/2024 12:19 PM   Protocol Details Check if Medication is Managed by MTM Kimberly Boecker, RN

## 2024-09-14 ENCOUNTER — HEALTH MAINTENANCE LETTER (OUTPATIENT)
Age: 59
End: 2024-09-14

## 2024-10-11 DIAGNOSIS — K29.01 OTHER ACUTE GASTRITIS WITH HEMORRHAGE: ICD-10-CM

## 2024-10-11 NOTE — TELEPHONE ENCOUNTER
SUCRALFATE 1 GM TABLET       Last Written Prescription Date:  08/26/2024  Last Fill Quantity: 120,   # refills: 0  Last Office Visit: 06/13/2024  Future Office visit:       Routing refill request to provider for review/approval because:    Miscellaneous Gastrointestinal Agents Sovfwg69/11/2024 11:11 AM   Protocol Details Check if Medication is Managed by MTM Kimberly Boecker, RN

## 2024-10-13 RX ORDER — SUCRALFATE 1 G/1
1 TABLET ORAL 4 TIMES DAILY
Qty: 120 TABLET | Refills: 0 | Status: SHIPPED | OUTPATIENT
Start: 2024-10-13

## 2025-05-11 ENCOUNTER — HEALTH MAINTENANCE LETTER (OUTPATIENT)
Age: 60
End: 2025-05-11

## (undated) DEVICE — DRAPE-EXTREMITY SHEET

## (undated) DEVICE — LABEL-STERILE PREPRINTED FOR OR

## (undated) DEVICE — CANISTER-SUCTION 2000CC

## (undated) DEVICE — TUBING SUCTION 10'X3/16" N510

## (undated) DEVICE — PACK-SET UP-CUSTOM

## (undated) DEVICE — ENDO KIT COMPLIANCE DYKENDOCMPLY

## (undated) DEVICE — DRSG-SPONGE STERILE 4 X 4

## (undated) DEVICE — BLADE-SCALPEL #15

## (undated) DEVICE — GOWN-SURG XL LVL 3 REINFORCED

## (undated) DEVICE — ENDO FORCEP ENDOJAW BIOPSY 2.8MMX230CM FB-220U

## (undated) DEVICE — DRSG-XEROFORM 1X8

## (undated) DEVICE — SYRINGE-10CC LUER LOCK

## (undated) DEVICE — DRSG-ABDOMINAL 5 X 9

## (undated) DEVICE — IRRIGATION-H2O 1000ML

## (undated) DEVICE — SYR 50ML LL W/O NDL 309653

## (undated) DEVICE — CUFF-DISP STERILE 18IN SINGLE BLADDER

## (undated) DEVICE — CAUTERY PAD-POLYHESIVE II ADULT

## (undated) DEVICE — CAUTERY PENCIL

## (undated) DEVICE — DRAPE-STERI 45X60CM #1010

## (undated) DEVICE — SUTURE-VICRYL 3-0 CT-1 J944H

## (undated) DEVICE — DRSG-KERLIX ROLL 4.5 X 4.1YD

## (undated) DEVICE — SOL WATER 1500ML

## (undated) DEVICE — GLV-7.0 BIOGEL PF/LF BLUE INDICATOR UNDERGLOVE

## (undated) DEVICE — NDL-25G 1-1/2" NON-SAFETY

## (undated) DEVICE — BRACE-ANKLE MEDIUM HIGH TOP/LOW PROFILE

## (undated) DEVICE — LIGHT HANDLE COVER

## (undated) DEVICE — TUBING-SUCTION 20FT

## (undated) DEVICE — SUTURE-VICRYL 3-0 SH J416H

## (undated) DEVICE — SUTURE-PROLENE 3-0 PS-1 8663G

## (undated) DEVICE — ENDO BITE BLOCK 60 MAXI LF 00712804

## (undated) DEVICE — APPLICATOR-CHLORAPREP 26ML TINTED CHG 2%+ 70% IPA-SURGICAL

## (undated) DEVICE — BDG-COBAN 4 INCH

## (undated) DEVICE — SUTURE-VICRYL 4-0 SH J315H

## (undated) DEVICE — CAUTERY-MEGADYNE TIP

## (undated) DEVICE — BDG-ELASTIC 4 INCH

## (undated) DEVICE — CAUTERY PENCIL-SMOKE EVACUATION

## (undated) DEVICE — GLV-6.5 BIOGEL LATEX GEN SURG

## (undated) DEVICE — PACK-BASIN SET-UP

## (undated) DEVICE — BDG-ESMARK 6 INCH X 9 FT

## (undated) DEVICE — IRRIGATION-NACL 1000ML

## (undated) DEVICE — Device

## (undated) DEVICE — COVER-TABLE SHEET

## (undated) DEVICE — BLADE-SAW 25.0MM X 9.0MM

## (undated) DEVICE — NDL-BLUNT FILL 18G 1.5"

## (undated) DEVICE — BIN-MINI, MAXI, MICRO DRIVER BLADES BIN

## (undated) DEVICE — SENSOR-OXISENSOR II ADULT

## (undated) DEVICE — SUCTION TUBE-YANKAUR

## (undated) DEVICE — SUTURE-ETHILON 4-0 FS-2 662G

## (undated) DEVICE — DRSG-KERLIX 6 X 6 3/4 FLUFF

## (undated) DEVICE — SUCTION MANIFOLD NEPTUNE 2 SYS 4 PORT 0702-020-000

## (undated) DEVICE — CAST PADDING-STERILE 4"

## (undated) RX ORDER — LIDOCAINE HYDROCHLORIDE 20 MG/ML
INJECTION, SOLUTION EPIDURAL; INFILTRATION; INTRACAUDAL; PERINEURAL
Status: DISPENSED
Start: 2021-05-17

## (undated) RX ORDER — PROPOFOL 10 MG/ML
INJECTION, EMULSION INTRAVENOUS
Status: DISPENSED
Start: 2019-04-01

## (undated) RX ORDER — LIDOCAINE HYDROCHLORIDE 20 MG/ML
INJECTION, SOLUTION EPIDURAL; INFILTRATION; INTRACAUDAL; PERINEURAL
Status: DISPENSED
Start: 2019-04-01

## (undated) RX ORDER — FENTANYL CITRATE 50 UG/ML
INJECTION, SOLUTION INTRAMUSCULAR; INTRAVENOUS
Status: DISPENSED
Start: 2021-05-17

## (undated) RX ORDER — PROPOFOL 10 MG/ML
INJECTION, EMULSION INTRAVENOUS
Status: DISPENSED
Start: 2021-05-17

## (undated) RX ORDER — FENTANYL CITRATE 50 UG/ML
INJECTION, SOLUTION INTRAMUSCULAR; INTRAVENOUS
Status: DISPENSED
Start: 2019-04-01